# Patient Record
Sex: FEMALE | Race: WHITE | NOT HISPANIC OR LATINO | Employment: OTHER | URBAN - METROPOLITAN AREA
[De-identification: names, ages, dates, MRNs, and addresses within clinical notes are randomized per-mention and may not be internally consistent; named-entity substitution may affect disease eponyms.]

---

## 2017-02-06 ENCOUNTER — COMMUNICATION - HEALTHEAST (OUTPATIENT)
Dept: INTERNAL MEDICINE | Facility: CLINIC | Age: 77
End: 2017-02-06

## 2017-02-06 DIAGNOSIS — I10 ESSENTIAL HYPERTENSION: ICD-10-CM

## 2017-02-08 ENCOUNTER — COMMUNICATION - HEALTHEAST (OUTPATIENT)
Dept: INTERNAL MEDICINE | Facility: CLINIC | Age: 77
End: 2017-02-08

## 2017-02-08 DIAGNOSIS — I10 ESSENTIAL HYPERTENSION: ICD-10-CM

## 2017-02-14 ENCOUNTER — OFFICE VISIT - HEALTHEAST (OUTPATIENT)
Dept: INTERNAL MEDICINE | Facility: CLINIC | Age: 77
End: 2017-02-14

## 2017-02-14 ENCOUNTER — AMBULATORY - HEALTHEAST (OUTPATIENT)
Dept: INTERNAL MEDICINE | Facility: CLINIC | Age: 77
End: 2017-02-14

## 2017-02-14 DIAGNOSIS — E03.9 HYPOTHYROIDISM, UNSPECIFIED TYPE: ICD-10-CM

## 2017-02-14 DIAGNOSIS — L82.0 SEBORRHEIC KERATOSES, INFLAMED: ICD-10-CM

## 2017-02-14 ASSESSMENT — MIFFLIN-ST. JEOR: SCORE: 1207.3

## 2017-03-20 ENCOUNTER — AMBULATORY - HEALTHEAST (OUTPATIENT)
Dept: INTERNAL MEDICINE | Facility: CLINIC | Age: 77
End: 2017-03-20

## 2017-03-20 ENCOUNTER — OFFICE VISIT - HEALTHEAST (OUTPATIENT)
Dept: INTERNAL MEDICINE | Facility: CLINIC | Age: 77
End: 2017-03-20

## 2017-03-20 DIAGNOSIS — K58.0 IRRITABLE BOWEL SYNDROME WITH DIARRHEA: ICD-10-CM

## 2017-03-20 DIAGNOSIS — Z01.818 PRE-OPERATIVE EXAMINATION FOR INTERNAL MEDICINE: ICD-10-CM

## 2017-03-20 LAB
ATRIAL RATE - MUSE: 66 BPM
DIASTOLIC BLOOD PRESSURE - MUSE: NORMAL MMHG
INTERPRETATION ECG - MUSE: NORMAL
P AXIS - MUSE: 52 DEGREES
PR INTERVAL - MUSE: 152 MS
QRS DURATION - MUSE: 80 MS
QT - MUSE: 418 MS
QTC - MUSE: 438 MS
R AXIS - MUSE: -24 DEGREES
SYSTOLIC BLOOD PRESSURE - MUSE: NORMAL MMHG
T AXIS - MUSE: 59 DEGREES
VENTRICULAR RATE- MUSE: 66 BPM

## 2017-03-20 ASSESSMENT — MIFFLIN-ST. JEOR: SCORE: 1200.49

## 2017-03-21 ENCOUNTER — COMMUNICATION - HEALTHEAST (OUTPATIENT)
Dept: INTERNAL MEDICINE | Facility: CLINIC | Age: 77
End: 2017-03-21

## 2017-04-11 ENCOUNTER — RECORDS - HEALTHEAST (OUTPATIENT)
Dept: ADMINISTRATIVE | Facility: OTHER | Age: 77
End: 2017-04-11

## 2017-04-18 ENCOUNTER — COMMUNICATION - HEALTHEAST (OUTPATIENT)
Dept: INTERNAL MEDICINE | Facility: CLINIC | Age: 77
End: 2017-04-18

## 2017-05-26 ENCOUNTER — RECORDS - HEALTHEAST (OUTPATIENT)
Dept: ADMINISTRATIVE | Facility: OTHER | Age: 77
End: 2017-05-26

## 2017-05-30 ENCOUNTER — RECORDS - HEALTHEAST (OUTPATIENT)
Dept: ADMINISTRATIVE | Facility: OTHER | Age: 77
End: 2017-05-30

## 2017-06-06 ENCOUNTER — RECORDS - HEALTHEAST (OUTPATIENT)
Dept: MAMMOGRAPHY | Facility: CLINIC | Age: 77
End: 2017-06-06

## 2017-06-06 DIAGNOSIS — Z12.31 ENCOUNTER FOR SCREENING MAMMOGRAM FOR MALIGNANT NEOPLASM OF BREAST: ICD-10-CM

## 2017-06-07 ENCOUNTER — COMMUNICATION - HEALTHEAST (OUTPATIENT)
Dept: INTERNAL MEDICINE | Facility: CLINIC | Age: 77
End: 2017-06-07

## 2017-06-07 DIAGNOSIS — I10 HTN (HYPERTENSION): ICD-10-CM

## 2017-07-12 ENCOUNTER — COMMUNICATION - HEALTHEAST (OUTPATIENT)
Dept: INTERNAL MEDICINE | Facility: CLINIC | Age: 77
End: 2017-07-12

## 2017-07-22 ENCOUNTER — COMMUNICATION - HEALTHEAST (OUTPATIENT)
Dept: INTERNAL MEDICINE | Facility: CLINIC | Age: 77
End: 2017-07-22

## 2017-07-22 DIAGNOSIS — I10 ESSENTIAL HYPERTENSION: ICD-10-CM

## 2017-08-11 ENCOUNTER — OFFICE VISIT - HEALTHEAST (OUTPATIENT)
Dept: INTERNAL MEDICINE | Facility: CLINIC | Age: 77
End: 2017-08-11

## 2017-08-11 DIAGNOSIS — I10 ESSENTIAL HYPERTENSION: ICD-10-CM

## 2017-08-11 DIAGNOSIS — K52.831 COLLAGENOUS COLITIS: ICD-10-CM

## 2017-08-11 ASSESSMENT — MIFFLIN-ST. JEOR: SCORE: 1195.96

## 2017-09-15 ENCOUNTER — RECORDS - HEALTHEAST (OUTPATIENT)
Dept: ADMINISTRATIVE | Facility: OTHER | Age: 77
End: 2017-09-15

## 2017-09-18 ENCOUNTER — RECORDS - HEALTHEAST (OUTPATIENT)
Dept: ADMINISTRATIVE | Facility: OTHER | Age: 77
End: 2017-09-18

## 2017-09-26 ENCOUNTER — RECORDS - HEALTHEAST (OUTPATIENT)
Dept: ADMINISTRATIVE | Facility: OTHER | Age: 77
End: 2017-09-26

## 2017-09-28 ENCOUNTER — OFFICE VISIT - HEALTHEAST (OUTPATIENT)
Dept: INTERNAL MEDICINE | Facility: CLINIC | Age: 77
End: 2017-09-28

## 2017-09-28 DIAGNOSIS — G47.00 INSOMNIA: ICD-10-CM

## 2017-09-28 DIAGNOSIS — M17.9 OA (OSTEOARTHRITIS) OF KNEE: ICD-10-CM

## 2017-09-28 DIAGNOSIS — Z23 NEED FOR INFLUENZA VACCINATION: ICD-10-CM

## 2017-09-28 DIAGNOSIS — I10 ESSENTIAL HYPERTENSION WITH GOAL BLOOD PRESSURE LESS THAN 140/90: ICD-10-CM

## 2017-12-31 ENCOUNTER — RECORDS - HEALTHEAST (OUTPATIENT)
Dept: ADMINISTRATIVE | Facility: OTHER | Age: 77
End: 2017-12-31

## 2018-02-07 ENCOUNTER — COMMUNICATION - HEALTHEAST (OUTPATIENT)
Dept: INTERNAL MEDICINE | Facility: CLINIC | Age: 78
End: 2018-02-07

## 2018-02-07 ENCOUNTER — OFFICE VISIT - HEALTHEAST (OUTPATIENT)
Dept: INTERNAL MEDICINE | Facility: CLINIC | Age: 78
End: 2018-02-07

## 2018-02-07 DIAGNOSIS — I10 ESSENTIAL HYPERTENSION: ICD-10-CM

## 2018-02-07 DIAGNOSIS — E55.9 VITAMIN D DEFICIENCY: ICD-10-CM

## 2018-02-07 DIAGNOSIS — I35.0 AORTIC STENOSIS, MILD: ICD-10-CM

## 2018-02-07 DIAGNOSIS — Z00.01 ENCOUNTER FOR GENERAL ADULT MEDICAL EXAMINATION WITH ABNORMAL FINDINGS: ICD-10-CM

## 2018-02-07 DIAGNOSIS — Z78.0 MENOPAUSE: ICD-10-CM

## 2018-02-07 DIAGNOSIS — E78.00 HYPERCHOLESTEROLEMIA: ICD-10-CM

## 2018-02-07 LAB
ALBUMIN SERPL-MCNC: 3.8 G/DL (ref 3.5–5)
ALBUMIN UR-MCNC: NEGATIVE MG/DL
ALP SERPL-CCNC: 75 U/L (ref 45–120)
ALT SERPL W P-5'-P-CCNC: 17 U/L (ref 0–45)
ANION GAP SERPL CALCULATED.3IONS-SCNC: 10 MMOL/L (ref 5–18)
APPEARANCE UR: CLEAR
AST SERPL W P-5'-P-CCNC: 22 U/L (ref 0–40)
BACTERIA #/AREA URNS HPF: ABNORMAL HPF
BILIRUB DIRECT SERPL-MCNC: 0.3 MG/DL
BILIRUB SERPL-MCNC: 0.8 MG/DL (ref 0–1)
BILIRUB UR QL STRIP: NEGATIVE
BUN SERPL-MCNC: 11 MG/DL (ref 8–28)
CALCIUM SERPL-MCNC: 9.1 MG/DL (ref 8.5–10.5)
CHLORIDE BLD-SCNC: 100 MMOL/L (ref 98–107)
CHOLEST SERPL-MCNC: 177 MG/DL
CO2 SERPL-SCNC: 26 MMOL/L (ref 22–31)
COLOR UR AUTO: YELLOW
CREAT SERPL-MCNC: 0.87 MG/DL (ref 0.6–1.1)
ERYTHROCYTE [DISTWIDTH] IN BLOOD BY AUTOMATED COUNT: 13.1 % (ref 11–14.5)
FASTING STATUS PATIENT QL REPORTED: YES
GFR SERPL CREATININE-BSD FRML MDRD: >60 ML/MIN/1.73M2
GLUCOSE BLD-MCNC: 88 MG/DL (ref 70–125)
GLUCOSE UR STRIP-MCNC: NEGATIVE MG/DL
HCT VFR BLD AUTO: 40.3 % (ref 35–47)
HDLC SERPL-MCNC: 83 MG/DL
HGB BLD-MCNC: 13.4 G/DL (ref 12–16)
HGB UR QL STRIP: ABNORMAL
KETONES UR STRIP-MCNC: NEGATIVE MG/DL
LDLC SERPL CALC-MCNC: 83 MG/DL
LEUKOCYTE ESTERASE UR QL STRIP: ABNORMAL
MCH RBC QN AUTO: 28.9 PG (ref 27–34)
MCHC RBC AUTO-ENTMCNC: 33.2 G/DL (ref 32–36)
MCV RBC AUTO: 87 FL (ref 80–100)
NITRATE UR QL: POSITIVE
PH UR STRIP: 7.5 [PH] (ref 5–8)
PLATELET # BLD AUTO: 216 THOU/UL (ref 140–440)
PMV BLD AUTO: 7.9 FL (ref 7–10)
POTASSIUM BLD-SCNC: 4.3 MMOL/L (ref 3.5–5)
PROT SERPL-MCNC: 6.9 G/DL (ref 6–8)
RBC # BLD AUTO: 4.62 MILL/UL (ref 3.8–5.4)
RBC #/AREA URNS AUTO: ABNORMAL HPF
SODIUM SERPL-SCNC: 136 MMOL/L (ref 136–145)
SP GR UR STRIP: 1.01 (ref 1–1.03)
SQUAMOUS #/AREA URNS AUTO: ABNORMAL LPF
TRIGL SERPL-MCNC: 56 MG/DL
TSH SERPL DL<=0.005 MIU/L-ACNC: 3.04 UIU/ML (ref 0.3–5)
UROBILINOGEN UR STRIP-ACNC: ABNORMAL
WBC #/AREA URNS AUTO: ABNORMAL HPF
WBC: 6.8 THOU/UL (ref 4–11)

## 2018-02-07 ASSESSMENT — MIFFLIN-ST. JEOR: SCORE: 1207.58

## 2018-02-08 LAB
25(OH)D3 SERPL-MCNC: 39.9 NG/ML (ref 30–80)
25(OH)D3 SERPL-MCNC: 39.9 NG/ML (ref 30–80)

## 2018-02-09 LAB — BACTERIA SPEC CULT: ABNORMAL

## 2018-02-14 ENCOUNTER — HOSPITAL ENCOUNTER (OUTPATIENT)
Dept: CARDIOLOGY | Facility: CLINIC | Age: 78
Discharge: HOME | End: 2018-02-14
Attending: INTERNAL MEDICINE

## 2018-02-14 DIAGNOSIS — I35.0 AORTIC STENOSIS, MILD: ICD-10-CM

## 2018-02-14 LAB
AORTIC ROOT: 2.7 CM
AORTIC VALVE MEAN VELOCITY: 154 CM/S
AR DECEL SLOPE: 2610 MM/S2
AR PEAK VELOCITY: 449 CM/S
ASCENDING AORTA: 3.2 CM
AV DIMENSIONLESS INDEX VTI: 0.7
AV MEAN GRADIENT: 11 MMHG
AV PEAK GRADIENT: 23.2 MMHG
AV REGURGITANT PEAK GRADIENT: 80.6 MMHG
AV REGURGITATION PRESSURE HALF TIME: 507 MS
AV VALVE AREA: 1.9 CM2
AV VELOCITY RATIO: 0.6
BSA FOR ECHO PROCEDURE: 1.82 M2
CV BLOOD PRESSURE: NORMAL MMHG
CV ECHO HEIGHT: 67.5 IN
CV ECHO WEIGHT: 153 LBS
DOP CALC AO PEAK VEL: 241 CM/S
DOP CALC AO VTI: 46 CM
DOP CALC LVOT AREA: 2.83 CM2
DOP CALC LVOT DIAMETER: 1.9 CM
DOP CALC LVOT PEAK VEL: 134 CM/S
DOP CALC LVOT STROKE VOLUME: 88.4 CM3
DOP CALCLVOT PEAK VEL VTI: 31.2 CM
EJECTION FRACTION: 62 % (ref 55–75)
FRACTIONAL SHORTENING: 36 % (ref 28–44)
INTERVENTRICULAR SEPTUM IN END DIASTOLE: 1.1 CM (ref 0.6–0.9)
IVS/PW RATIO: 0.9
LA AREA 1: 22.1 CM2
LA AREA 2: 24 CM2
LEFT ATRIUM LENGTH: 5.94 CM
LEFT ATRIUM SIZE: 4.3 CM
LEFT ATRIUM VOLUME INDEX: 41.7 ML/M2
LEFT ATRIUM VOLUME: 75.9 ML
LEFT VENTRICLE DIASTOLIC VOLUME INDEX: 40.7 CM3/M2 (ref 34–74)
LEFT VENTRICLE DIASTOLIC VOLUME: 74 CM3 (ref 46–106)
LEFT VENTRICLE MASS INDEX: 121 G/M2
LEFT VENTRICLE SYSTOLIC VOLUME INDEX: 15.4 CM3/M2 (ref 11–31)
LEFT VENTRICLE SYSTOLIC VOLUME: 28 CM3 (ref 14–42)
LEFT VENTRICULAR INTERNAL DIMENSION IN DIASTOLE: 5 CM (ref 3.8–5.2)
LEFT VENTRICULAR INTERNAL DIMENSION IN SYSTOLE: 3.2 CM (ref 2.2–3.5)
LEFT VENTRICULAR MASS: 220.3 G
LEFT VENTRICULAR OUTFLOW TRACT MEAN GRADIENT: 4 MMHG
LEFT VENTRICULAR OUTFLOW TRACT MEAN VELOCITY: 83.8 CM/S
LEFT VENTRICULAR OUTFLOW TRACT PEAK GRADIENT: 7 MMHG
LEFT VENTRICULAR POSTERIOR WALL IN END DIASTOLE: 1.2 CM (ref 0.6–0.9)
LV STROKE VOLUME INDEX: 48.6 ML/M2
MITRAL VALVE E/A RATIO: 1.3
MV AVERAGE E/E' RATIO: 7.9 CM/S
MV DECELERATION TIME: 215 MS
MV E'TISSUE VEL-LAT: 9.85 CM/S
MV E'TISSUE VEL-MED: 7.21 CM/S
MV LATERAL E/E' RATIO: 6.9
MV MEDIAL E/E' RATIO: 9.4
MV PEAK A VELOCITY: 50.8 CM/S
MV PEAK E VELOCITY: 67.6 CM/S
NUC REST DIASTOLIC VOLUME INDEX: 2455 LBS
NUC REST SYSTOLIC VOLUME INDEX: 67.5 IN
TRICUSPID VALVE ANULAR PLANE SYSTOLIC EXCURSION: 3.5 CM

## 2018-02-14 ASSESSMENT — MIFFLIN-ST. JEOR: SCORE: 1211.55

## 2018-02-21 ENCOUNTER — RECORDS - HEALTHEAST (OUTPATIENT)
Dept: ADMINISTRATIVE | Facility: OTHER | Age: 78
End: 2018-02-21

## 2018-02-22 ENCOUNTER — RECORDS - HEALTHEAST (OUTPATIENT)
Dept: ADMINISTRATIVE | Facility: OTHER | Age: 78
End: 2018-02-22

## 2018-02-22 ENCOUNTER — RECORDS - HEALTHEAST (OUTPATIENT)
Dept: BONE DENSITY | Facility: CLINIC | Age: 78
End: 2018-02-22

## 2018-02-22 DIAGNOSIS — Z78.0 ASYMPTOMATIC MENOPAUSAL STATE: ICD-10-CM

## 2018-05-01 ENCOUNTER — COMMUNICATION - HEALTHEAST (OUTPATIENT)
Dept: INTERNAL MEDICINE | Facility: CLINIC | Age: 78
End: 2018-05-01

## 2018-05-01 DIAGNOSIS — I10 ESSENTIAL HYPERTENSION: ICD-10-CM

## 2018-06-07 ENCOUNTER — RECORDS - HEALTHEAST (OUTPATIENT)
Dept: ADMINISTRATIVE | Facility: OTHER | Age: 78
End: 2018-06-07

## 2018-06-18 ENCOUNTER — RECORDS - HEALTHEAST (OUTPATIENT)
Dept: ADMINISTRATIVE | Facility: OTHER | Age: 78
End: 2018-06-18

## 2018-07-13 ENCOUNTER — RECORDS - HEALTHEAST (OUTPATIENT)
Dept: ADMINISTRATIVE | Facility: OTHER | Age: 78
End: 2018-07-13

## 2018-08-17 ENCOUNTER — RECORDS - HEALTHEAST (OUTPATIENT)
Dept: ADMINISTRATIVE | Facility: OTHER | Age: 78
End: 2018-08-17

## 2018-08-23 ENCOUNTER — RECORDS - HEALTHEAST (OUTPATIENT)
Dept: ADMINISTRATIVE | Facility: OTHER | Age: 78
End: 2018-08-23

## 2018-09-18 ENCOUNTER — OFFICE VISIT - HEALTHEAST (OUTPATIENT)
Dept: INTERNAL MEDICINE | Facility: CLINIC | Age: 78
End: 2018-09-18

## 2018-09-18 DIAGNOSIS — Z23 FLU VACCINE NEED: ICD-10-CM

## 2018-09-18 DIAGNOSIS — I10 ESSENTIAL HYPERTENSION: ICD-10-CM

## 2018-10-30 ENCOUNTER — COMMUNICATION - HEALTHEAST (OUTPATIENT)
Dept: INTERNAL MEDICINE | Facility: CLINIC | Age: 78
End: 2018-10-30

## 2018-10-30 DIAGNOSIS — I10 ESSENTIAL HYPERTENSION: ICD-10-CM

## 2018-12-03 ENCOUNTER — COMMUNICATION - HEALTHEAST (OUTPATIENT)
Dept: SCHEDULING | Facility: CLINIC | Age: 78
End: 2018-12-03

## 2018-12-05 ENCOUNTER — OFFICE VISIT - HEALTHEAST (OUTPATIENT)
Dept: INTERNAL MEDICINE | Facility: CLINIC | Age: 78
End: 2018-12-05

## 2018-12-05 DIAGNOSIS — I10 ESSENTIAL HYPERTENSION: ICD-10-CM

## 2019-01-08 ENCOUNTER — RECORDS - HEALTHEAST (OUTPATIENT)
Dept: ADMINISTRATIVE | Facility: OTHER | Age: 79
End: 2019-01-08

## 2019-02-22 ENCOUNTER — RECORDS - HEALTHEAST (OUTPATIENT)
Dept: ADMINISTRATIVE | Facility: OTHER | Age: 79
End: 2019-02-22

## 2019-03-01 ENCOUNTER — OFFICE VISIT - HEALTHEAST (OUTPATIENT)
Dept: INTERNAL MEDICINE | Facility: CLINIC | Age: 79
End: 2019-03-01

## 2019-03-01 DIAGNOSIS — E78.00 HYPERCHOLESTEROLEMIA: ICD-10-CM

## 2019-03-01 DIAGNOSIS — Z12.31 ENCOUNTER FOR SCREENING MAMMOGRAM FOR BREAST CANCER: ICD-10-CM

## 2019-03-01 DIAGNOSIS — F41.1 GENERALIZED ANXIETY DISORDER: ICD-10-CM

## 2019-03-01 DIAGNOSIS — I10 ESSENTIAL HYPERTENSION: ICD-10-CM

## 2019-03-01 DIAGNOSIS — E55.9 VITAMIN D DEFICIENCY: ICD-10-CM

## 2019-03-01 DIAGNOSIS — Z00.00 ROUTINE GENERAL MEDICAL EXAMINATION AT A HEALTH CARE FACILITY: ICD-10-CM

## 2019-03-01 DIAGNOSIS — M89.9 DISORDER OF BONE AND CARTILAGE: ICD-10-CM

## 2019-03-01 DIAGNOSIS — M94.9 DISORDER OF BONE AND CARTILAGE: ICD-10-CM

## 2019-03-01 DIAGNOSIS — R68.84 JAW PAIN: ICD-10-CM

## 2019-03-01 LAB
ALBUMIN SERPL-MCNC: 3.9 G/DL (ref 3.5–5)
ALBUMIN UR-MCNC: NEGATIVE MG/DL
ALP SERPL-CCNC: 76 U/L (ref 45–120)
ALT SERPL W P-5'-P-CCNC: 18 U/L (ref 0–45)
ANION GAP SERPL CALCULATED.3IONS-SCNC: 9 MMOL/L (ref 5–18)
APPEARANCE UR: CLEAR
AST SERPL W P-5'-P-CCNC: 22 U/L (ref 0–40)
BACTERIA #/AREA URNS HPF: ABNORMAL HPF
BASOPHILS # BLD AUTO: 0.1 THOU/UL (ref 0–0.2)
BASOPHILS NFR BLD AUTO: 1 % (ref 0–2)
BILIRUB SERPL-MCNC: 0.7 MG/DL (ref 0–1)
BILIRUB UR QL STRIP: NEGATIVE
BUN SERPL-MCNC: 12 MG/DL (ref 8–28)
CALCIUM SERPL-MCNC: 9.1 MG/DL (ref 8.5–10.5)
CHLORIDE BLD-SCNC: 100 MMOL/L (ref 98–107)
CHOLEST SERPL-MCNC: 178 MG/DL
CO2 SERPL-SCNC: 28 MMOL/L (ref 22–31)
COLOR UR AUTO: YELLOW
CREAT SERPL-MCNC: 0.87 MG/DL (ref 0.6–1.1)
EOSINOPHIL # BLD AUTO: 0.2 THOU/UL (ref 0–0.4)
EOSINOPHIL NFR BLD AUTO: 3 % (ref 0–6)
ERYTHROCYTE [DISTWIDTH] IN BLOOD BY AUTOMATED COUNT: 11.7 % (ref 11–14.5)
ERYTHROCYTE [SEDIMENTATION RATE] IN BLOOD BY WESTERGREN METHOD: 28 MM/HR (ref 0–20)
FASTING STATUS PATIENT QL REPORTED: YES
GFR SERPL CREATININE-BSD FRML MDRD: >60 ML/MIN/1.73M2
GLUCOSE BLD-MCNC: 69 MG/DL (ref 70–125)
GLUCOSE UR STRIP-MCNC: NEGATIVE MG/DL
HCT VFR BLD AUTO: 39.9 % (ref 35–47)
HDLC SERPL-MCNC: 76 MG/DL
HGB BLD-MCNC: 13.4 G/DL (ref 12–16)
HGB UR QL STRIP: ABNORMAL
KETONES UR STRIP-MCNC: NEGATIVE MG/DL
LDLC SERPL CALC-MCNC: 87 MG/DL
LEUKOCYTE ESTERASE UR QL STRIP: NEGATIVE
LYMPHOCYTES # BLD AUTO: 1.7 THOU/UL (ref 0.8–4.4)
LYMPHOCYTES NFR BLD AUTO: 23 % (ref 20–40)
MCH RBC QN AUTO: 30.8 PG (ref 27–34)
MCHC RBC AUTO-ENTMCNC: 33.6 G/DL (ref 32–36)
MCV RBC AUTO: 92 FL (ref 80–100)
MONOCYTES # BLD AUTO: 0.7 THOU/UL (ref 0–0.9)
MONOCYTES NFR BLD AUTO: 9 % (ref 2–10)
NEUTROPHILS # BLD AUTO: 4.9 THOU/UL (ref 2–7.7)
NEUTROPHILS NFR BLD AUTO: 65 % (ref 50–70)
NITRATE UR QL: NEGATIVE
PH UR STRIP: 7 [PH] (ref 5–8)
PLATELET # BLD AUTO: 233 THOU/UL (ref 140–440)
PMV BLD AUTO: 7.7 FL (ref 7–10)
POTASSIUM BLD-SCNC: 4.3 MMOL/L (ref 3.5–5)
PROT SERPL-MCNC: 7.1 G/DL (ref 6–8)
RBC # BLD AUTO: 4.35 MILL/UL (ref 3.8–5.4)
RBC #/AREA URNS AUTO: ABNORMAL HPF
SODIUM SERPL-SCNC: 137 MMOL/L (ref 136–145)
SP GR UR STRIP: 1.01 (ref 1–1.03)
SQUAMOUS #/AREA URNS AUTO: ABNORMAL LPF
TRIGL SERPL-MCNC: 76 MG/DL
TSH SERPL DL<=0.005 MIU/L-ACNC: 5.56 UIU/ML (ref 0.3–5)
UROBILINOGEN UR STRIP-ACNC: ABNORMAL
WBC #/AREA URNS AUTO: ABNORMAL HPF
WBC: 7.5 THOU/UL (ref 4–11)

## 2019-03-01 ASSESSMENT — MIFFLIN-ST. JEOR: SCORE: 1238.83

## 2019-03-04 LAB
25(OH)D3 SERPL-MCNC: 53.2 NG/ML (ref 30–80)
25(OH)D3 SERPL-MCNC: 53.2 NG/ML (ref 30–80)

## 2019-03-21 ENCOUNTER — COMMUNICATION - HEALTHEAST (OUTPATIENT)
Dept: INTERNAL MEDICINE | Facility: CLINIC | Age: 79
End: 2019-03-21

## 2019-03-28 ENCOUNTER — RECORDS - HEALTHEAST (OUTPATIENT)
Dept: MAMMOGRAPHY | Facility: CLINIC | Age: 79
End: 2019-03-28

## 2019-03-28 DIAGNOSIS — Z12.31 ENCOUNTER FOR SCREENING MAMMOGRAM FOR MALIGNANT NEOPLASM OF BREAST: ICD-10-CM

## 2019-04-05 ENCOUNTER — COMMUNICATION - HEALTHEAST (OUTPATIENT)
Dept: INTERNAL MEDICINE | Facility: CLINIC | Age: 79
End: 2019-04-05

## 2019-04-05 DIAGNOSIS — I10 ESSENTIAL HYPERTENSION: ICD-10-CM

## 2019-05-08 ENCOUNTER — OFFICE VISIT - HEALTHEAST (OUTPATIENT)
Dept: INTERNAL MEDICINE | Facility: CLINIC | Age: 79
End: 2019-05-08

## 2019-05-08 DIAGNOSIS — R79.89 ABNORMAL TSH: ICD-10-CM

## 2019-05-08 DIAGNOSIS — R41.3 MEMORY CHANGES: ICD-10-CM

## 2019-05-08 DIAGNOSIS — F32.5 MAJOR DEPRESSIVE DISORDER, SINGLE EPISODE IN FULL REMISSION (H): ICD-10-CM

## 2019-05-08 DIAGNOSIS — L71.9 ROSACEA: ICD-10-CM

## 2019-05-08 DIAGNOSIS — I10 ESSENTIAL HYPERTENSION: ICD-10-CM

## 2019-05-08 LAB
TSH SERPL DL<=0.005 MIU/L-ACNC: 2.06 UIU/ML (ref 0.3–5)
VIT B12 SERPL-MCNC: 515 PG/ML (ref 213–816)

## 2019-05-08 RX ORDER — FLUVOXAMINE MALEATE 25 MG
TABLET ORAL
Refills: 0 | Status: SHIPPED
Start: 2019-05-08 | End: 2022-05-12

## 2019-05-08 RX ORDER — PAROXETINE 10 MG/1
TABLET, FILM COATED ORAL
Refills: 0 | Status: SHIPPED
Start: 2019-05-08 | End: 2021-10-12

## 2019-05-08 ASSESSMENT — MIFFLIN-ST. JEOR: SCORE: 1228.85

## 2019-05-09 ENCOUNTER — COMMUNICATION - HEALTHEAST (OUTPATIENT)
Dept: INTERNAL MEDICINE | Facility: CLINIC | Age: 79
End: 2019-05-09

## 2019-05-14 ENCOUNTER — COMMUNICATION - HEALTHEAST (OUTPATIENT)
Dept: SCHEDULING | Facility: CLINIC | Age: 79
End: 2019-05-14

## 2019-05-14 DIAGNOSIS — I10 ESSENTIAL HYPERTENSION, BENIGN: ICD-10-CM

## 2019-05-21 ENCOUNTER — AMBULATORY - HEALTHEAST (OUTPATIENT)
Dept: NURSING | Facility: CLINIC | Age: 79
End: 2019-05-21

## 2019-06-04 ENCOUNTER — RECORDS - HEALTHEAST (OUTPATIENT)
Dept: ADMINISTRATIVE | Facility: OTHER | Age: 79
End: 2019-06-04

## 2019-06-06 ENCOUNTER — OFFICE VISIT - HEALTHEAST (OUTPATIENT)
Dept: INTERNAL MEDICINE | Facility: CLINIC | Age: 79
End: 2019-06-06

## 2019-06-06 DIAGNOSIS — R35.0 URINE FREQUENCY: ICD-10-CM

## 2019-06-06 DIAGNOSIS — G93.40 ENCEPHALOPATHY: ICD-10-CM

## 2019-06-06 LAB
ALBUMIN UR-MCNC: ABNORMAL MG/DL
APPEARANCE UR: ABNORMAL
BACTERIA #/AREA URNS HPF: ABNORMAL HPF
BILIRUB UR QL STRIP: NEGATIVE
COLOR UR AUTO: YELLOW
GLUCOSE UR STRIP-MCNC: NEGATIVE MG/DL
HGB UR QL STRIP: ABNORMAL
KETONES UR STRIP-MCNC: NEGATIVE MG/DL
LEUKOCYTE ESTERASE UR QL STRIP: ABNORMAL
NITRATE UR QL: POSITIVE
PH UR STRIP: 6 [PH] (ref 5–8)
RBC #/AREA URNS AUTO: ABNORMAL HPF
SP GR UR STRIP: 1.01 (ref 1–1.03)
SQUAMOUS #/AREA URNS AUTO: ABNORMAL LPF
UROBILINOGEN UR STRIP-ACNC: ABNORMAL
WBC #/AREA URNS AUTO: ABNORMAL HPF

## 2019-06-06 ASSESSMENT — MIFFLIN-ST. JEOR: SCORE: 1228.85

## 2019-06-08 LAB — BACTERIA SPEC CULT: ABNORMAL

## 2019-06-11 ENCOUNTER — RECORDS - HEALTHEAST (OUTPATIENT)
Dept: LAB | Facility: CLINIC | Age: 79
End: 2019-06-11

## 2019-06-12 ENCOUNTER — OFFICE VISIT - HEALTHEAST (OUTPATIENT)
Dept: GERIATRICS | Facility: CLINIC | Age: 79
End: 2019-06-12

## 2019-06-12 DIAGNOSIS — F42.9 OBSESSIVE-COMPULSIVE DISORDER, UNSPECIFIED TYPE: ICD-10-CM

## 2019-06-12 DIAGNOSIS — Z87.898 HISTORY OF BACTEREMIA: ICD-10-CM

## 2019-06-12 DIAGNOSIS — F32.5 MAJOR DEPRESSIVE DISORDER, SINGLE EPISODE IN FULL REMISSION (H): ICD-10-CM

## 2019-06-12 DIAGNOSIS — E87.1 HYPONATREMIA: ICD-10-CM

## 2019-06-12 DIAGNOSIS — K58.0 IRRITABLE BOWEL SYNDROME WITH DIARRHEA: ICD-10-CM

## 2019-06-12 DIAGNOSIS — F41.1 GENERALIZED ANXIETY DISORDER: ICD-10-CM

## 2019-06-12 LAB
ERYTHROCYTE [DISTWIDTH] IN BLOOD BY AUTOMATED COUNT: 14.5 % (ref 11–14.5)
HCT VFR BLD AUTO: 32.4 % (ref 35–47)
HGB BLD-MCNC: 11.7 G/DL (ref 12–16)
MCH RBC QN AUTO: 33.9 PG (ref 27–34)
MCHC RBC AUTO-ENTMCNC: 36.1 G/DL (ref 32–36)
MCV RBC AUTO: 94 FL (ref 80–100)
PLATELET # BLD AUTO: 426 THOU/UL (ref 140–440)
PMV BLD AUTO: 10.2 FL (ref 8.5–12.5)
RBC # BLD AUTO: 3.45 MILL/UL (ref 3.8–5.4)
WBC: 14.3 THOU/UL (ref 4–11)

## 2019-06-12 ASSESSMENT — MIFFLIN-ST. JEOR: SCORE: 1261.96

## 2019-06-13 ENCOUNTER — COMMUNICATION - HEALTHEAST (OUTPATIENT)
Dept: GERIATRICS | Facility: CLINIC | Age: 79
End: 2019-06-13

## 2019-06-13 ENCOUNTER — RECORDS - HEALTHEAST (OUTPATIENT)
Dept: LAB | Facility: CLINIC | Age: 79
End: 2019-06-13

## 2019-06-13 LAB
ANION GAP SERPL CALCULATED.3IONS-SCNC: 10 MMOL/L (ref 5–18)
BUN SERPL-MCNC: 18 MG/DL (ref 8–28)
CALCIUM SERPL-MCNC: 9.9 MG/DL (ref 8.5–10.5)
CHLORIDE BLD-SCNC: 100 MMOL/L (ref 98–107)
CO2 SERPL-SCNC: 26 MMOL/L (ref 22–31)
CREAT SERPL-MCNC: 0.9 MG/DL (ref 0.6–1.1)
ERYTHROCYTE [DISTWIDTH] IN BLOOD BY AUTOMATED COUNT: 14.2 % (ref 11–14.5)
GFR SERPL CREATININE-BSD FRML MDRD: >60 ML/MIN/1.73M2
GLUCOSE BLD-MCNC: 101 MG/DL (ref 70–125)
HCT VFR BLD AUTO: 35.2 % (ref 35–47)
HGB BLD-MCNC: 12 G/DL (ref 12–16)
MCH RBC QN AUTO: 32.1 PG (ref 27–34)
MCHC RBC AUTO-ENTMCNC: 34.1 G/DL (ref 32–36)
MCV RBC AUTO: 94 FL (ref 80–100)
PLATELET # BLD AUTO: 462 THOU/UL (ref 140–440)
PMV BLD AUTO: 10.1 FL (ref 8.5–12.5)
POTASSIUM BLD-SCNC: 4.3 MMOL/L (ref 3.5–5)
RBC # BLD AUTO: 3.74 MILL/UL (ref 3.8–5.4)
SODIUM SERPL-SCNC: 136 MMOL/L (ref 136–145)
WBC: 13.2 THOU/UL (ref 4–11)

## 2019-06-14 ENCOUNTER — OFFICE VISIT - HEALTHEAST (OUTPATIENT)
Dept: GERIATRICS | Facility: CLINIC | Age: 79
End: 2019-06-14

## 2019-06-14 DIAGNOSIS — R78.81 BACTEREMIA: ICD-10-CM

## 2019-06-14 DIAGNOSIS — F41.1 GENERALIZED ANXIETY DISORDER: ICD-10-CM

## 2019-06-14 DIAGNOSIS — N30.90 CYSTITIS: ICD-10-CM

## 2019-06-14 DIAGNOSIS — E87.6 HYPOKALEMIA: ICD-10-CM

## 2019-06-14 DIAGNOSIS — K58.0 IRRITABLE BOWEL SYNDROME WITH DIARRHEA: ICD-10-CM

## 2019-06-14 DIAGNOSIS — F42.9 OBSESSIVE-COMPULSIVE DISORDER, UNSPECIFIED TYPE: ICD-10-CM

## 2019-06-14 DIAGNOSIS — I10 ESSENTIAL HYPERTENSION: ICD-10-CM

## 2019-06-14 DIAGNOSIS — E87.1 HYPONATREMIA: ICD-10-CM

## 2019-06-17 ENCOUNTER — OFFICE VISIT - HEALTHEAST (OUTPATIENT)
Dept: GERIATRICS | Facility: CLINIC | Age: 79
End: 2019-06-17

## 2019-06-17 DIAGNOSIS — K58.0 IRRITABLE BOWEL SYNDROME WITH DIARRHEA: ICD-10-CM

## 2019-06-17 DIAGNOSIS — I10 ESSENTIAL HYPERTENSION: ICD-10-CM

## 2019-06-17 DIAGNOSIS — F42.9 OBSESSIVE-COMPULSIVE DISORDER, UNSPECIFIED TYPE: ICD-10-CM

## 2019-06-17 DIAGNOSIS — E87.1 HYPONATREMIA: ICD-10-CM

## 2019-06-17 DIAGNOSIS — F41.1 GENERALIZED ANXIETY DISORDER: ICD-10-CM

## 2019-06-17 DIAGNOSIS — Z87.898 HISTORY OF BACTEREMIA: ICD-10-CM

## 2019-06-17 DIAGNOSIS — F32.5 MAJOR DEPRESSIVE DISORDER, SINGLE EPISODE IN FULL REMISSION (H): ICD-10-CM

## 2019-06-17 ASSESSMENT — MIFFLIN-ST. JEOR: SCORE: 1248.35

## 2019-06-20 ENCOUNTER — AMBULATORY - HEALTHEAST (OUTPATIENT)
Dept: GERIATRICS | Facility: CLINIC | Age: 79
End: 2019-06-20

## 2019-06-20 ENCOUNTER — OFFICE VISIT - HEALTHEAST (OUTPATIENT)
Dept: INTERNAL MEDICINE | Facility: CLINIC | Age: 79
End: 2019-06-20

## 2019-06-20 ENCOUNTER — COMMUNICATION - HEALTHEAST (OUTPATIENT)
Dept: GERIATRICS | Facility: CLINIC | Age: 79
End: 2019-06-20

## 2019-06-20 DIAGNOSIS — N39.0 URINARY TRACT INFECTION WITHOUT HEMATURIA, SITE UNSPECIFIED: ICD-10-CM

## 2019-06-20 DIAGNOSIS — L30.4 INTERTRIGO: ICD-10-CM

## 2019-06-20 DIAGNOSIS — I10 ESSENTIAL HYPERTENSION: ICD-10-CM

## 2019-06-20 DIAGNOSIS — K13.0 ANGULAR CHEILITIS: ICD-10-CM

## 2019-06-20 LAB
ALBUMIN UR-MCNC: NEGATIVE MG/DL
APPEARANCE UR: CLEAR
BACTERIA #/AREA URNS HPF: ABNORMAL HPF
BASOPHILS # BLD AUTO: 0.1 THOU/UL (ref 0–0.2)
BASOPHILS NFR BLD AUTO: 1 % (ref 0–2)
BILIRUB UR QL STRIP: NEGATIVE
COLOR UR AUTO: YELLOW
EOSINOPHIL # BLD AUTO: 0.1 THOU/UL (ref 0–0.4)
EOSINOPHIL NFR BLD AUTO: 1 % (ref 0–6)
ERYTHROCYTE [DISTWIDTH] IN BLOOD BY AUTOMATED COUNT: 11.2 % (ref 11–14.5)
GLUCOSE UR STRIP-MCNC: NEGATIVE MG/DL
HCT VFR BLD AUTO: 36.5 % (ref 35–47)
HGB BLD-MCNC: 12.3 G/DL (ref 12–16)
HGB UR QL STRIP: ABNORMAL
KETONES UR STRIP-MCNC: NEGATIVE MG/DL
LEUKOCYTE ESTERASE UR QL STRIP: ABNORMAL
LYMPHOCYTES # BLD AUTO: 1.7 THOU/UL (ref 0.8–4.4)
LYMPHOCYTES NFR BLD AUTO: 17 % (ref 20–40)
MCH RBC QN AUTO: 30.5 PG (ref 27–34)
MCHC RBC AUTO-ENTMCNC: 33.7 G/DL (ref 32–36)
MCV RBC AUTO: 90 FL (ref 80–100)
MONOCYTES # BLD AUTO: 0.8 THOU/UL (ref 0–0.9)
MONOCYTES NFR BLD AUTO: 8 % (ref 2–10)
NEUTROPHILS # BLD AUTO: 7.1 THOU/UL (ref 2–7.7)
NEUTROPHILS NFR BLD AUTO: 72 % (ref 50–70)
NITRATE UR QL: NEGATIVE
PH UR STRIP: 6 [PH] (ref 5–8)
PLATELET # BLD AUTO: 399 THOU/UL (ref 140–440)
PMV BLD AUTO: 6.9 FL (ref 7–10)
RBC # BLD AUTO: 4.04 MILL/UL (ref 3.8–5.4)
RBC #/AREA URNS AUTO: ABNORMAL HPF
SP GR UR STRIP: 1.01 (ref 1–1.03)
SQUAMOUS #/AREA URNS AUTO: ABNORMAL LPF
UROBILINOGEN UR STRIP-ACNC: ABNORMAL
WBC #/AREA URNS AUTO: ABNORMAL HPF
WBC: 9.8 THOU/UL (ref 4–11)

## 2019-06-20 ASSESSMENT — MIFFLIN-ST. JEOR: SCORE: 1246.7

## 2019-06-21 LAB
ANION GAP SERPL CALCULATED.3IONS-SCNC: 12 MMOL/L (ref 5–18)
BUN SERPL-MCNC: 21 MG/DL (ref 8–28)
CALCIUM SERPL-MCNC: 9.6 MG/DL (ref 8.5–10.5)
CHLORIDE BLD-SCNC: 101 MMOL/L (ref 98–107)
CO2 SERPL-SCNC: 24 MMOL/L (ref 22–31)
CREAT SERPL-MCNC: 0.94 MG/DL (ref 0.6–1.1)
GFR SERPL CREATININE-BSD FRML MDRD: 58 ML/MIN/1.73M2
GLUCOSE BLD-MCNC: 70 MG/DL (ref 70–125)
POTASSIUM BLD-SCNC: 4.4 MMOL/L (ref 3.5–5)
SODIUM SERPL-SCNC: 137 MMOL/L (ref 136–145)

## 2019-06-23 LAB — BACTERIA SPEC CULT: ABNORMAL

## 2019-06-25 ENCOUNTER — COMMUNICATION - HEALTHEAST (OUTPATIENT)
Dept: INTERNAL MEDICINE | Facility: CLINIC | Age: 79
End: 2019-06-25

## 2019-06-25 ENCOUNTER — RECORDS - HEALTHEAST (OUTPATIENT)
Dept: ADMINISTRATIVE | Facility: OTHER | Age: 79
End: 2019-06-25

## 2019-06-25 DIAGNOSIS — N39.0 RECURRENT UTI: ICD-10-CM

## 2019-06-25 DIAGNOSIS — N95.2 VAGINAL ATROPHY: ICD-10-CM

## 2019-07-05 ENCOUNTER — COMMUNICATION - HEALTHEAST (OUTPATIENT)
Dept: SCHEDULING | Facility: CLINIC | Age: 79
End: 2019-07-05

## 2019-07-08 ENCOUNTER — OFFICE VISIT - HEALTHEAST (OUTPATIENT)
Dept: INTERNAL MEDICINE | Facility: CLINIC | Age: 79
End: 2019-07-08

## 2019-07-08 DIAGNOSIS — T50.905A ADVERSE EFFECT OF DRUG, INITIAL ENCOUNTER: ICD-10-CM

## 2019-07-08 DIAGNOSIS — R21 RASH: ICD-10-CM

## 2019-07-08 DIAGNOSIS — I10 ESSENTIAL HYPERTENSION: ICD-10-CM

## 2019-07-08 DIAGNOSIS — N39.0 RECURRENT UTI: ICD-10-CM

## 2019-07-08 ASSESSMENT — MIFFLIN-ST. JEOR: SCORE: 1253.79

## 2019-08-16 ENCOUNTER — COMMUNICATION - HEALTHEAST (OUTPATIENT)
Dept: INTERNAL MEDICINE | Facility: CLINIC | Age: 79
End: 2019-08-16

## 2019-08-16 DIAGNOSIS — I10 ESSENTIAL HYPERTENSION: ICD-10-CM

## 2019-08-26 ENCOUNTER — OFFICE VISIT - HEALTHEAST (OUTPATIENT)
Dept: INTERNAL MEDICINE | Facility: CLINIC | Age: 79
End: 2019-08-26

## 2019-08-26 DIAGNOSIS — R60.9 EDEMA, UNSPECIFIED TYPE: ICD-10-CM

## 2019-08-26 DIAGNOSIS — I87.2 VENOUS (PERIPHERAL) INSUFFICIENCY: ICD-10-CM

## 2019-08-26 DIAGNOSIS — I10 ESSENTIAL HYPERTENSION: ICD-10-CM

## 2019-08-26 ASSESSMENT — MIFFLIN-ST. JEOR: SCORE: 1264.85

## 2019-09-09 ENCOUNTER — OFFICE VISIT - HEALTHEAST (OUTPATIENT)
Dept: INTERNAL MEDICINE | Facility: CLINIC | Age: 79
End: 2019-09-09

## 2019-09-09 DIAGNOSIS — F41.9 ANXIETY: ICD-10-CM

## 2019-09-09 DIAGNOSIS — I10 ESSENTIAL HYPERTENSION: ICD-10-CM

## 2019-09-09 DIAGNOSIS — R60.9 EDEMA, UNSPECIFIED TYPE: ICD-10-CM

## 2019-09-09 ASSESSMENT — MIFFLIN-ST. JEOR: SCORE: 1253.79

## 2019-09-12 ENCOUNTER — COMMUNICATION - HEALTHEAST (OUTPATIENT)
Dept: INTERNAL MEDICINE | Facility: CLINIC | Age: 79
End: 2019-09-12

## 2019-10-09 ENCOUNTER — OFFICE VISIT - HEALTHEAST (OUTPATIENT)
Dept: INTERNAL MEDICINE | Facility: CLINIC | Age: 79
End: 2019-10-09

## 2019-10-09 DIAGNOSIS — Z23 FLU VACCINE NEED: ICD-10-CM

## 2019-10-09 DIAGNOSIS — I10 ESSENTIAL HYPERTENSION: ICD-10-CM

## 2019-10-09 LAB
ANION GAP SERPL CALCULATED.3IONS-SCNC: 8 MMOL/L (ref 5–18)
BUN SERPL-MCNC: 23 MG/DL (ref 8–28)
CALCIUM SERPL-MCNC: 10.2 MG/DL (ref 8.5–10.5)
CHLORIDE BLD-SCNC: 104 MMOL/L (ref 98–107)
CO2 SERPL-SCNC: 24 MMOL/L (ref 22–31)
CREAT SERPL-MCNC: 1.42 MG/DL (ref 0.6–1.1)
GFR SERPL CREATININE-BSD FRML MDRD: 36 ML/MIN/1.73M2
GLUCOSE BLD-MCNC: 70 MG/DL (ref 70–125)
POTASSIUM BLD-SCNC: 4.9 MMOL/L (ref 3.5–5)
SODIUM SERPL-SCNC: 136 MMOL/L (ref 136–145)

## 2019-10-09 ASSESSMENT — MIFFLIN-ST. JEOR: SCORE: 1235.65

## 2019-10-11 ENCOUNTER — AMBULATORY - HEALTHEAST (OUTPATIENT)
Dept: INTERNAL MEDICINE | Facility: CLINIC | Age: 79
End: 2019-10-11

## 2019-10-11 DIAGNOSIS — N17.9 AKI (ACUTE KIDNEY INJURY) (H): ICD-10-CM

## 2019-11-22 ENCOUNTER — COMMUNICATION - HEALTHEAST (OUTPATIENT)
Dept: INTERNAL MEDICINE | Facility: CLINIC | Age: 79
End: 2019-11-22

## 2019-11-22 DIAGNOSIS — R60.9 EDEMA, UNSPECIFIED TYPE: ICD-10-CM

## 2019-11-22 DIAGNOSIS — I10 ESSENTIAL HYPERTENSION: ICD-10-CM

## 2019-12-04 ENCOUNTER — OFFICE VISIT - HEALTHEAST (OUTPATIENT)
Dept: INTERNAL MEDICINE | Facility: CLINIC | Age: 79
End: 2019-12-04

## 2019-12-04 DIAGNOSIS — R19.7 DIARRHEA, UNSPECIFIED TYPE: ICD-10-CM

## 2019-12-04 DIAGNOSIS — F32.5 MAJOR DEPRESSIVE DISORDER, SINGLE EPISODE IN FULL REMISSION (H): ICD-10-CM

## 2019-12-04 DIAGNOSIS — N17.9 AKI (ACUTE KIDNEY INJURY) (H): ICD-10-CM

## 2019-12-04 DIAGNOSIS — I10 ESSENTIAL HYPERTENSION: ICD-10-CM

## 2019-12-04 LAB
ANION GAP SERPL CALCULATED.3IONS-SCNC: 9 MMOL/L (ref 5–18)
BUN SERPL-MCNC: 21 MG/DL (ref 8–28)
CALCIUM SERPL-MCNC: 9.7 MG/DL (ref 8.5–10.5)
CHLORIDE BLD-SCNC: 105 MMOL/L (ref 98–107)
CO2 SERPL-SCNC: 25 MMOL/L (ref 22–31)
CREAT SERPL-MCNC: 1.3 MG/DL (ref 0.6–1.1)
GFR SERPL CREATININE-BSD FRML MDRD: 40 ML/MIN/1.73M2
GLUCOSE BLD-MCNC: 97 MG/DL (ref 70–125)
POTASSIUM BLD-SCNC: 4.6 MMOL/L (ref 3.5–5)
SODIUM SERPL-SCNC: 139 MMOL/L (ref 136–145)
TSH SERPL DL<=0.005 MIU/L-ACNC: 2.07 UIU/ML (ref 0.3–5)

## 2019-12-04 ASSESSMENT — MIFFLIN-ST. JEOR: SCORE: 1253.79

## 2019-12-04 ASSESSMENT — PATIENT HEALTH QUESTIONNAIRE - PHQ9: SUM OF ALL RESPONSES TO PHQ QUESTIONS 1-9: 3

## 2019-12-05 ENCOUNTER — COMMUNICATION - HEALTHEAST (OUTPATIENT)
Dept: INTERNAL MEDICINE | Facility: CLINIC | Age: 79
End: 2019-12-05

## 2019-12-05 DIAGNOSIS — I10 ESSENTIAL HYPERTENSION: ICD-10-CM

## 2020-01-17 ENCOUNTER — RECORDS - HEALTHEAST (OUTPATIENT)
Dept: ADMINISTRATIVE | Facility: OTHER | Age: 80
End: 2020-01-17

## 2020-01-30 ENCOUNTER — COMMUNICATION - HEALTHEAST (OUTPATIENT)
Dept: INTERNAL MEDICINE | Facility: CLINIC | Age: 80
End: 2020-01-30

## 2020-01-30 DIAGNOSIS — R60.9 EDEMA, UNSPECIFIED TYPE: ICD-10-CM

## 2020-01-30 DIAGNOSIS — I10 ESSENTIAL HYPERTENSION: ICD-10-CM

## 2020-04-13 ENCOUNTER — COMMUNICATION - HEALTHEAST (OUTPATIENT)
Dept: INTERNAL MEDICINE | Facility: CLINIC | Age: 80
End: 2020-04-13

## 2020-04-13 DIAGNOSIS — I10 ESSENTIAL HYPERTENSION: ICD-10-CM

## 2020-07-03 ENCOUNTER — OFFICE VISIT - HEALTHEAST (OUTPATIENT)
Dept: INTERNAL MEDICINE | Facility: CLINIC | Age: 80
End: 2020-07-03

## 2020-07-03 DIAGNOSIS — F33.1 MODERATE EPISODE OF RECURRENT MAJOR DEPRESSIVE DISORDER (H): ICD-10-CM

## 2020-07-03 DIAGNOSIS — H57.9 EYE DISEASE: ICD-10-CM

## 2020-07-03 DIAGNOSIS — Z01.818 PREOP EXAM FOR INTERNAL MEDICINE: ICD-10-CM

## 2020-07-03 DIAGNOSIS — I10 ESSENTIAL HYPERTENSION: ICD-10-CM

## 2020-07-03 DIAGNOSIS — E55.9 VITAMIN D DEFICIENCY: ICD-10-CM

## 2020-07-03 LAB
ANION GAP SERPL CALCULATED.3IONS-SCNC: 11 MMOL/L (ref 5–18)
BASOPHILS # BLD AUTO: 0.1 THOU/UL (ref 0–0.2)
BASOPHILS NFR BLD AUTO: 1 % (ref 0–2)
BUN SERPL-MCNC: 25 MG/DL (ref 8–28)
CALCIUM SERPL-MCNC: 9.6 MG/DL (ref 8.5–10.5)
CHLORIDE BLD-SCNC: 102 MMOL/L (ref 98–107)
CO2 SERPL-SCNC: 22 MMOL/L (ref 22–31)
CREAT SERPL-MCNC: 1.38 MG/DL (ref 0.6–1.1)
EOSINOPHIL # BLD AUTO: 0.3 THOU/UL (ref 0–0.4)
EOSINOPHIL NFR BLD AUTO: 3 % (ref 0–6)
ERYTHROCYTE [DISTWIDTH] IN BLOOD BY AUTOMATED COUNT: 12 % (ref 11–14.5)
GFR SERPL CREATININE-BSD FRML MDRD: 37 ML/MIN/1.73M2
GLUCOSE BLD-MCNC: 85 MG/DL (ref 70–125)
HCT VFR BLD AUTO: 35.1 % (ref 35–47)
HGB BLD-MCNC: 12 G/DL (ref 12–16)
LYMPHOCYTES # BLD AUTO: 1.4 THOU/UL (ref 0.8–4.4)
LYMPHOCYTES NFR BLD AUTO: 15 % (ref 20–40)
MCH RBC QN AUTO: 31.6 PG (ref 27–34)
MCHC RBC AUTO-ENTMCNC: 34.2 G/DL (ref 32–36)
MCV RBC AUTO: 92 FL (ref 80–100)
MONOCYTES # BLD AUTO: 0.6 THOU/UL (ref 0–0.9)
MONOCYTES NFR BLD AUTO: 7 % (ref 2–10)
NEUTROPHILS # BLD AUTO: 6.7 THOU/UL (ref 2–7.7)
NEUTROPHILS NFR BLD AUTO: 74 % (ref 50–70)
PLATELET # BLD AUTO: 263 THOU/UL (ref 140–440)
PMV BLD AUTO: 8.3 FL (ref 7–10)
POTASSIUM BLD-SCNC: 5 MMOL/L (ref 3.5–5)
RBC # BLD AUTO: 3.79 MILL/UL (ref 3.8–5.4)
SODIUM SERPL-SCNC: 135 MMOL/L (ref 136–145)
TSH SERPL DL<=0.005 MIU/L-ACNC: 2.29 UIU/ML (ref 0.3–5)
WBC: 9 THOU/UL (ref 4–11)

## 2020-07-03 ASSESSMENT — MIFFLIN-ST. JEOR: SCORE: 1251.53

## 2020-07-06 LAB
25(OH)D3 SERPL-MCNC: 63.4 NG/ML (ref 30–80)
25(OH)D3 SERPL-MCNC: 63.4 NG/ML (ref 30–80)

## 2020-07-07 LAB
ATRIAL RATE - MUSE: 62 BPM
DIASTOLIC BLOOD PRESSURE - MUSE: NORMAL
INTERPRETATION ECG - MUSE: NORMAL
P AXIS - MUSE: 48 DEGREES
PR INTERVAL - MUSE: 148 MS
QRS DURATION - MUSE: 80 MS
QT - MUSE: 394 MS
QTC - MUSE: 399 MS
R AXIS - MUSE: -21 DEGREES
SYSTOLIC BLOOD PRESSURE - MUSE: NORMAL
T AXIS - MUSE: 53 DEGREES
VENTRICULAR RATE- MUSE: 62 BPM

## 2020-07-08 ENCOUNTER — RECORDS - HEALTHEAST (OUTPATIENT)
Dept: ADMINISTRATIVE | Facility: OTHER | Age: 80
End: 2020-07-08

## 2020-10-05 ENCOUNTER — COMMUNICATION - HEALTHEAST (OUTPATIENT)
Dept: INTERNAL MEDICINE | Facility: CLINIC | Age: 80
End: 2020-10-05

## 2020-10-08 ENCOUNTER — OFFICE VISIT - HEALTHEAST (OUTPATIENT)
Dept: INTERNAL MEDICINE | Facility: CLINIC | Age: 80
End: 2020-10-08

## 2020-10-08 DIAGNOSIS — F32.5 MAJOR DEPRESSIVE DISORDER, SINGLE EPISODE IN FULL REMISSION (H): ICD-10-CM

## 2020-10-08 DIAGNOSIS — E55.9 VITAMIN D DEFICIENCY: ICD-10-CM

## 2020-10-08 DIAGNOSIS — M17.10 ARTHRITIS OF KNEE: ICD-10-CM

## 2020-10-08 DIAGNOSIS — Z78.0 POST-MENOPAUSAL: ICD-10-CM

## 2020-10-08 DIAGNOSIS — Z00.00 ROUTINE GENERAL MEDICAL EXAMINATION AT A HEALTH CARE FACILITY: ICD-10-CM

## 2020-10-08 DIAGNOSIS — R19.7 DIARRHEA, UNSPECIFIED TYPE: ICD-10-CM

## 2020-10-08 DIAGNOSIS — R26.89 BALANCE PROBLEMS: ICD-10-CM

## 2020-10-08 DIAGNOSIS — R53.83 FATIGUE, UNSPECIFIED TYPE: ICD-10-CM

## 2020-10-08 DIAGNOSIS — Z12.31 ENCOUNTER FOR SCREENING MAMMOGRAM FOR BREAST CANCER: ICD-10-CM

## 2020-10-08 LAB
ALBUMIN SERPL-MCNC: 4 G/DL (ref 3.5–5)
ALP SERPL-CCNC: 67 U/L (ref 45–120)
ALT SERPL W P-5'-P-CCNC: 17 U/L (ref 0–45)
ANION GAP SERPL CALCULATED.3IONS-SCNC: 10 MMOL/L (ref 5–18)
AST SERPL W P-5'-P-CCNC: 23 U/L (ref 0–40)
BASOPHILS # BLD AUTO: 0.1 THOU/UL (ref 0–0.2)
BASOPHILS NFR BLD AUTO: 1 % (ref 0–2)
BILIRUB SERPL-MCNC: 0.4 MG/DL (ref 0–1)
BUN SERPL-MCNC: 31 MG/DL (ref 8–28)
CALCIUM SERPL-MCNC: 9.6 MG/DL (ref 8.5–10.5)
CHLORIDE BLD-SCNC: 105 MMOL/L (ref 98–107)
CO2 SERPL-SCNC: 23 MMOL/L (ref 22–31)
CREAT SERPL-MCNC: 1.59 MG/DL (ref 0.6–1.1)
EOSINOPHIL # BLD AUTO: 0.2 THOU/UL (ref 0–0.4)
EOSINOPHIL NFR BLD AUTO: 2 % (ref 0–6)
ERYTHROCYTE [DISTWIDTH] IN BLOOD BY AUTOMATED COUNT: 11.3 % (ref 11–14.5)
GFR SERPL CREATININE-BSD FRML MDRD: 31 ML/MIN/1.73M2
GLUCOSE BLD-MCNC: 85 MG/DL (ref 70–125)
HCT VFR BLD AUTO: 35.9 % (ref 35–47)
HGB BLD-MCNC: 12.1 G/DL (ref 12–16)
LYMPHOCYTES # BLD AUTO: 1.2 THOU/UL (ref 0.8–4.4)
LYMPHOCYTES NFR BLD AUTO: 15 % (ref 20–40)
MCH RBC QN AUTO: 31.3 PG (ref 27–34)
MCHC RBC AUTO-ENTMCNC: 33.7 G/DL (ref 32–36)
MCV RBC AUTO: 93 FL (ref 80–100)
MONOCYTES # BLD AUTO: 0.5 THOU/UL (ref 0–0.9)
MONOCYTES NFR BLD AUTO: 6 % (ref 2–10)
NEUTROPHILS # BLD AUTO: 6.4 THOU/UL (ref 2–7.7)
NEUTROPHILS NFR BLD AUTO: 77 % (ref 50–70)
PLATELET # BLD AUTO: 235 THOU/UL (ref 140–440)
PMV BLD AUTO: 7.8 FL (ref 7–10)
POTASSIUM BLD-SCNC: 4.3 MMOL/L (ref 3.5–5)
PROT SERPL-MCNC: 7.3 G/DL (ref 6–8)
RBC # BLD AUTO: 3.86 MILL/UL (ref 3.8–5.4)
SODIUM SERPL-SCNC: 138 MMOL/L (ref 136–145)
TSH SERPL DL<=0.005 MIU/L-ACNC: 2.54 UIU/ML (ref 0.3–5)
WBC: 8.4 THOU/UL (ref 4–11)

## 2020-10-08 ASSESSMENT — MIFFLIN-ST. JEOR: SCORE: 1239.05

## 2020-10-09 LAB
25(OH)D3 SERPL-MCNC: 57.6 NG/ML (ref 30–80)
25(OH)D3 SERPL-MCNC: 57.6 NG/ML (ref 30–80)
GLIADIN IGA SER-ACNC: 0.8 U/ML
GLIADIN IGG SER-ACNC: 0.7 U/ML
IGA SERPL-MCNC: 106 MG/DL (ref 65–400)
TTG IGA SER-ACNC: 0.8 U/ML
TTG IGG SER-ACNC: 5.4 U/ML

## 2020-10-09 ASSESSMENT — ANXIETY QUESTIONNAIRES
2. NOT BEING ABLE TO STOP OR CONTROL WORRYING: SEVERAL DAYS
4. TROUBLE RELAXING: SEVERAL DAYS
3. WORRYING TOO MUCH ABOUT DIFFERENT THINGS: SEVERAL DAYS
GAD7 TOTAL SCORE: 6
5. BEING SO RESTLESS THAT IT IS HARD TO SIT STILL: NOT AT ALL
6. BECOMING EASILY ANNOYED OR IRRITABLE: SEVERAL DAYS
7. FEELING AFRAID AS IF SOMETHING AWFUL MIGHT HAPPEN: SEVERAL DAYS
IF YOU CHECKED OFF ANY PROBLEMS ON THIS QUESTIONNAIRE, HOW DIFFICULT HAVE THESE PROBLEMS MADE IT FOR YOU TO DO YOUR WORK, TAKE CARE OF THINGS AT HOME, OR GET ALONG WITH OTHER PEOPLE: SOMEWHAT DIFFICULT
1. FEELING NERVOUS, ANXIOUS, OR ON EDGE: SEVERAL DAYS

## 2020-10-09 ASSESSMENT — PATIENT HEALTH QUESTIONNAIRE - PHQ9: SUM OF ALL RESPONSES TO PHQ QUESTIONS 1-9: 5

## 2020-10-11 ENCOUNTER — COMMUNICATION - HEALTHEAST (OUTPATIENT)
Dept: INTERNAL MEDICINE | Facility: CLINIC | Age: 80
End: 2020-10-11

## 2020-10-11 DIAGNOSIS — I10 ESSENTIAL HYPERTENSION: ICD-10-CM

## 2020-10-14 RX ORDER — SPIRONOLACTONE 25 MG/1
25 TABLET ORAL DAILY
Qty: 90 TABLET | Refills: 3 | Status: SHIPPED | OUTPATIENT
Start: 2020-10-14 | End: 2022-06-01

## 2020-10-16 ENCOUNTER — AMBULATORY - HEALTHEAST (OUTPATIENT)
Dept: LAB | Facility: CLINIC | Age: 80
End: 2020-10-16

## 2020-10-16 DIAGNOSIS — R19.7 DIARRHEA, UNSPECIFIED TYPE: ICD-10-CM

## 2020-10-16 LAB
C DIFF TOX B STL QL: NEGATIVE
RIBOTYPE 027/NAP1/BI: NORMAL

## 2020-10-17 LAB
SHIGA TOXIN 1: NEGATIVE
SHIGA TOXIN 2: NEGATIVE

## 2020-10-19 LAB — BACTERIA SPEC CULT: NORMAL

## 2020-10-20 ENCOUNTER — COMMUNICATION - HEALTHEAST (OUTPATIENT)
Dept: INTERNAL MEDICINE | Facility: CLINIC | Age: 80
End: 2020-10-20

## 2020-10-20 DIAGNOSIS — R19.7 DIARRHEA, UNSPECIFIED TYPE: ICD-10-CM

## 2020-11-03 ENCOUNTER — OFFICE VISIT - HEALTHEAST (OUTPATIENT)
Dept: PHYSICAL THERAPY | Facility: REHABILITATION | Age: 80
End: 2020-11-03

## 2020-11-03 DIAGNOSIS — G89.29 BILATERAL CHRONIC KNEE PAIN: ICD-10-CM

## 2020-11-03 DIAGNOSIS — R29.898 WEAKNESS OF BOTH LOWER EXTREMITIES: ICD-10-CM

## 2020-11-03 DIAGNOSIS — M25.661 DECREASED RANGE OF MOTION (ROM) OF RIGHT KNEE: ICD-10-CM

## 2020-11-03 DIAGNOSIS — M25.562 BILATERAL CHRONIC KNEE PAIN: ICD-10-CM

## 2020-11-03 DIAGNOSIS — R26.89 BALANCE PROBLEMS: ICD-10-CM

## 2020-11-03 DIAGNOSIS — M25.561 BILATERAL CHRONIC KNEE PAIN: ICD-10-CM

## 2020-11-17 ENCOUNTER — OFFICE VISIT - HEALTHEAST (OUTPATIENT)
Dept: PHYSICAL THERAPY | Facility: REHABILITATION | Age: 80
End: 2020-11-17

## 2020-11-17 DIAGNOSIS — M25.562 BILATERAL CHRONIC KNEE PAIN: ICD-10-CM

## 2020-11-17 DIAGNOSIS — M25.561 BILATERAL CHRONIC KNEE PAIN: ICD-10-CM

## 2020-11-17 DIAGNOSIS — R29.898 WEAKNESS OF BOTH LOWER EXTREMITIES: ICD-10-CM

## 2020-11-17 DIAGNOSIS — R26.89 BALANCE PROBLEMS: ICD-10-CM

## 2020-11-17 DIAGNOSIS — G89.29 BILATERAL CHRONIC KNEE PAIN: ICD-10-CM

## 2020-11-17 DIAGNOSIS — M25.661 DECREASED RANGE OF MOTION (ROM) OF RIGHT KNEE: ICD-10-CM

## 2020-11-24 ENCOUNTER — OFFICE VISIT - HEALTHEAST (OUTPATIENT)
Dept: PHYSICAL THERAPY | Facility: REHABILITATION | Age: 80
End: 2020-11-24

## 2020-11-24 DIAGNOSIS — R26.89 BALANCE PROBLEMS: ICD-10-CM

## 2020-11-24 DIAGNOSIS — M25.562 BILATERAL CHRONIC KNEE PAIN: ICD-10-CM

## 2020-11-24 DIAGNOSIS — M25.661 DECREASED RANGE OF MOTION (ROM) OF RIGHT KNEE: ICD-10-CM

## 2020-11-24 DIAGNOSIS — G89.29 BILATERAL CHRONIC KNEE PAIN: ICD-10-CM

## 2020-11-24 DIAGNOSIS — M25.561 BILATERAL CHRONIC KNEE PAIN: ICD-10-CM

## 2020-11-24 DIAGNOSIS — R29.898 WEAKNESS OF BOTH LOWER EXTREMITIES: ICD-10-CM

## 2020-11-27 ENCOUNTER — RECORDS - HEALTHEAST (OUTPATIENT)
Dept: ADMINISTRATIVE | Facility: OTHER | Age: 80
End: 2020-11-27

## 2020-12-01 ENCOUNTER — OFFICE VISIT - HEALTHEAST (OUTPATIENT)
Dept: PHYSICAL THERAPY | Facility: REHABILITATION | Age: 80
End: 2020-12-01

## 2020-12-01 DIAGNOSIS — R26.89 BALANCE PROBLEMS: ICD-10-CM

## 2020-12-01 DIAGNOSIS — R29.898 WEAKNESS OF BOTH LOWER EXTREMITIES: ICD-10-CM

## 2020-12-01 DIAGNOSIS — G89.29 BILATERAL CHRONIC KNEE PAIN: ICD-10-CM

## 2020-12-01 DIAGNOSIS — M25.561 BILATERAL CHRONIC KNEE PAIN: ICD-10-CM

## 2020-12-01 DIAGNOSIS — M25.661 DECREASED RANGE OF MOTION (ROM) OF RIGHT KNEE: ICD-10-CM

## 2020-12-01 DIAGNOSIS — M25.562 BILATERAL CHRONIC KNEE PAIN: ICD-10-CM

## 2020-12-08 ENCOUNTER — RECORDS - HEALTHEAST (OUTPATIENT)
Dept: BONE DENSITY | Facility: CLINIC | Age: 80
End: 2020-12-08

## 2020-12-08 ENCOUNTER — RECORDS - HEALTHEAST (OUTPATIENT)
Dept: ADMINISTRATIVE | Facility: OTHER | Age: 80
End: 2020-12-08

## 2020-12-08 DIAGNOSIS — Z78.0 ASYMPTOMATIC MENOPAUSAL STATE: ICD-10-CM

## 2020-12-15 ENCOUNTER — OFFICE VISIT - HEALTHEAST (OUTPATIENT)
Dept: PHYSICAL THERAPY | Facility: REHABILITATION | Age: 80
End: 2020-12-15

## 2020-12-15 DIAGNOSIS — R26.89 BALANCE PROBLEMS: ICD-10-CM

## 2020-12-15 DIAGNOSIS — M25.562 BILATERAL CHRONIC KNEE PAIN: ICD-10-CM

## 2020-12-15 DIAGNOSIS — M25.561 BILATERAL CHRONIC KNEE PAIN: ICD-10-CM

## 2020-12-15 DIAGNOSIS — R29.898 WEAKNESS OF BOTH LOWER EXTREMITIES: ICD-10-CM

## 2020-12-15 DIAGNOSIS — M25.661 DECREASED RANGE OF MOTION (ROM) OF RIGHT KNEE: ICD-10-CM

## 2020-12-15 DIAGNOSIS — G89.29 BILATERAL CHRONIC KNEE PAIN: ICD-10-CM

## 2020-12-24 ENCOUNTER — OFFICE VISIT - HEALTHEAST (OUTPATIENT)
Dept: PHYSICAL THERAPY | Facility: REHABILITATION | Age: 80
End: 2020-12-24

## 2020-12-24 DIAGNOSIS — R29.898 WEAKNESS OF BOTH LOWER EXTREMITIES: ICD-10-CM

## 2020-12-24 DIAGNOSIS — M25.561 BILATERAL CHRONIC KNEE PAIN: ICD-10-CM

## 2020-12-24 DIAGNOSIS — M25.661 DECREASED RANGE OF MOTION (ROM) OF RIGHT KNEE: ICD-10-CM

## 2020-12-24 DIAGNOSIS — G89.29 BILATERAL CHRONIC KNEE PAIN: ICD-10-CM

## 2020-12-24 DIAGNOSIS — M25.562 BILATERAL CHRONIC KNEE PAIN: ICD-10-CM

## 2020-12-24 DIAGNOSIS — R26.89 BALANCE PROBLEMS: ICD-10-CM

## 2020-12-29 ENCOUNTER — COMMUNICATION - HEALTHEAST (OUTPATIENT)
Dept: PHYSICAL THERAPY | Facility: REHABILITATION | Age: 80
End: 2020-12-29

## 2020-12-29 ENCOUNTER — OFFICE VISIT - HEALTHEAST (OUTPATIENT)
Dept: PHYSICAL THERAPY | Facility: REHABILITATION | Age: 80
End: 2020-12-29

## 2020-12-29 DIAGNOSIS — R26.89 BALANCE PROBLEMS: ICD-10-CM

## 2020-12-29 DIAGNOSIS — G89.29 BILATERAL CHRONIC KNEE PAIN: ICD-10-CM

## 2020-12-29 DIAGNOSIS — M25.661 DECREASED RANGE OF MOTION (ROM) OF RIGHT KNEE: ICD-10-CM

## 2020-12-29 DIAGNOSIS — R29.898 WEAKNESS OF BOTH LOWER EXTREMITIES: ICD-10-CM

## 2020-12-29 DIAGNOSIS — M25.562 BILATERAL CHRONIC KNEE PAIN: ICD-10-CM

## 2020-12-29 DIAGNOSIS — M25.561 BILATERAL CHRONIC KNEE PAIN: ICD-10-CM

## 2021-01-05 ENCOUNTER — OFFICE VISIT - HEALTHEAST (OUTPATIENT)
Dept: PHYSICAL THERAPY | Facility: REHABILITATION | Age: 81
End: 2021-01-05

## 2021-01-05 DIAGNOSIS — M25.661 DECREASED RANGE OF MOTION (ROM) OF RIGHT KNEE: ICD-10-CM

## 2021-01-05 DIAGNOSIS — M25.562 BILATERAL CHRONIC KNEE PAIN: ICD-10-CM

## 2021-01-05 DIAGNOSIS — M25.561 BILATERAL CHRONIC KNEE PAIN: ICD-10-CM

## 2021-01-05 DIAGNOSIS — R29.898 WEAKNESS OF BOTH LOWER EXTREMITIES: ICD-10-CM

## 2021-01-05 DIAGNOSIS — G89.29 BILATERAL CHRONIC KNEE PAIN: ICD-10-CM

## 2021-01-05 DIAGNOSIS — R26.89 BALANCE PROBLEMS: ICD-10-CM

## 2021-01-12 ENCOUNTER — OFFICE VISIT - HEALTHEAST (OUTPATIENT)
Dept: PHYSICAL THERAPY | Facility: REHABILITATION | Age: 81
End: 2021-01-12

## 2021-01-12 DIAGNOSIS — R29.898 WEAKNESS OF BOTH LOWER EXTREMITIES: ICD-10-CM

## 2021-01-12 DIAGNOSIS — M25.562 BILATERAL CHRONIC KNEE PAIN: ICD-10-CM

## 2021-01-12 DIAGNOSIS — M25.661 DECREASED RANGE OF MOTION (ROM) OF RIGHT KNEE: ICD-10-CM

## 2021-01-12 DIAGNOSIS — G89.29 BILATERAL CHRONIC KNEE PAIN: ICD-10-CM

## 2021-01-12 DIAGNOSIS — M25.561 BILATERAL CHRONIC KNEE PAIN: ICD-10-CM

## 2021-01-12 DIAGNOSIS — R26.89 BALANCE PROBLEMS: ICD-10-CM

## 2021-02-02 ENCOUNTER — RECORDS - HEALTHEAST (OUTPATIENT)
Dept: MAMMOGRAPHY | Facility: CLINIC | Age: 81
End: 2021-02-02

## 2021-02-02 DIAGNOSIS — Z12.31 ENCOUNTER FOR SCREENING MAMMOGRAM FOR MALIGNANT NEOPLASM OF BREAST: ICD-10-CM

## 2021-02-05 ENCOUNTER — AMBULATORY - HEALTHEAST (OUTPATIENT)
Dept: NURSING | Facility: CLINIC | Age: 81
End: 2021-02-05

## 2021-02-26 ENCOUNTER — AMBULATORY - HEALTHEAST (OUTPATIENT)
Dept: NURSING | Facility: CLINIC | Age: 81
End: 2021-02-26

## 2021-03-06 ENCOUNTER — COMMUNICATION - HEALTHEAST (OUTPATIENT)
Dept: INTERNAL MEDICINE | Facility: CLINIC | Age: 81
End: 2021-03-06

## 2021-03-06 DIAGNOSIS — I10 ESSENTIAL HYPERTENSION: ICD-10-CM

## 2021-03-29 ENCOUNTER — COMMUNICATION - HEALTHEAST (OUTPATIENT)
Dept: INTERNAL MEDICINE | Facility: CLINIC | Age: 81
End: 2021-03-29

## 2021-03-29 DIAGNOSIS — I10 ESSENTIAL HYPERTENSION: ICD-10-CM

## 2021-03-31 RX ORDER — METOPROLOL SUCCINATE 50 MG/1
50 TABLET, EXTENDED RELEASE ORAL DAILY
Qty: 90 TABLET | Refills: 2 | Status: SHIPPED | OUTPATIENT
Start: 2021-03-31 | End: 2022-01-09

## 2021-04-08 ENCOUNTER — RECORDS - HEALTHEAST (OUTPATIENT)
Dept: ADMINISTRATIVE | Facility: OTHER | Age: 81
End: 2021-04-08

## 2021-04-29 ENCOUNTER — RECORDS - HEALTHEAST (OUTPATIENT)
Dept: ADMINISTRATIVE | Facility: OTHER | Age: 81
End: 2021-04-29

## 2021-05-03 ENCOUNTER — COMMUNICATION - HEALTHEAST (OUTPATIENT)
Dept: INTERNAL MEDICINE | Facility: CLINIC | Age: 81
End: 2021-05-03

## 2021-05-03 DIAGNOSIS — R60.9 EDEMA, UNSPECIFIED TYPE: ICD-10-CM

## 2021-05-03 DIAGNOSIS — I10 ESSENTIAL HYPERTENSION: ICD-10-CM

## 2021-05-26 ASSESSMENT — PATIENT HEALTH QUESTIONNAIRE - PHQ9: SUM OF ALL RESPONSES TO PHQ QUESTIONS 1-9: 3

## 2021-05-27 ASSESSMENT — PATIENT HEALTH QUESTIONNAIRE - PHQ9: SUM OF ALL RESPONSES TO PHQ QUESTIONS 1-9: 5

## 2021-05-27 NOTE — TELEPHONE ENCOUNTER
Refill Approved    Rx renewed per Medication Renewal Policy. Medication was last renewed on 11/1/18 .    Kyra Rodriguez, [facility] [place] Connection Triage/Med Refill 4/6/2019     Requested Prescriptions   Pending Prescriptions Disp Refills     metoprolol succinate (TOPROL-XL) 50 MG 24 hr tablet [Pharmacy Med Name: METOPROLOL SUCC ER 50MG** 50 TAB] 90 tablet 2     Sig: TAKE 1 TABLET (50 MG TOTAL) BY MOUTH DAILY.    Beta-Blockers Refill Protocol Passed - 4/5/2019  1:17 PM       Passed - PCP or prescribing provider visit in past 12 months or next 3 months    Last office visit with prescriber/PCP: 9/18/2018 Lupe Quintero MD OR same dept: 12/5/2018 Benton Moore MD OR same specialty: 12/5/2018 Benton Moore MD  Last physical: 2/7/2018 Last MTM visit: Visit date not found   Next visit within 3 mo: Visit date not found  Next physical within 3 mo: Visit date not found  Prescriber OR PCP: Lupe Quintero MD  Last diagnosis associated with med order: 1. Essential hypertension  - metoprolol succinate (TOPROL-XL) 50 MG 24 hr tablet [Pharmacy Med Name: METOPROLOL SUCC ER 50MG** 50 TAB]; TAKE 1 TABLET (50 MG TOTAL) BY MOUTH DAILY.  Dispense: 90 tablet; Refill: 2    If protocol passes may refill for 12 months if within 3 months of last provider visit (or a total of 15 months).            Passed - Blood pressure filed in past 12 months    BP Readings from Last 1 Encounters:   03/01/19 (!) 160/96

## 2021-05-28 ASSESSMENT — ANXIETY QUESTIONNAIRES: GAD7 TOTAL SCORE: 6

## 2021-05-28 NOTE — PROGRESS NOTES
[facility] Follow Up Note    Assessment/Plan:    1. Hypertension  Blood pressure in the office have been moderately elevated at 140-160 and at home severely elevated at 1 70-1 80.  She will continue losartan at bedtime and metoprolol in the morning and will add amlodipine 5 mg at bedtime.  In the future we can taper amlodipine up if needed and consider switching losartan to Spironolactone if still having difficulty regulating her blood pressure.  She will call with updates on her readings next week.  I will follow-up with her in a month to repeat blood work and blood pressure in the office and she will bring her blood pressure cuff with her at that time.  Currently she denies any chest pains or shortness of breath  - amLODIPine (NORVASC) 5 MG tablet; Take 1 tablet (5 mg total) by mouth daily.  Dispense: 30 tablet; Refill: 4    2. Major Depression, Single Episode In Remission  She is seeing a psychologist and psychiatrist.  I believe this is also contributing to her hypertension.  - Thyroid Stimulating Hormone (TSH)  - Vitamin B12  - fluvoxaMINE (LUVOX) 25 MG tablet; 2 tabs in the morning and 3 tabs in the evening; Refill: 0  - PARoxetine (PAXIL) 10 MG tablet; One tab in the morning and 2 tabs in the evening; Refill: 0    3. Abnormal TSH  TSH was slightly elevated at 5.5.  We will repeat it again today and if it continues to be abnormal I would put her on Synthroid replacement given her history of hypertension, depression and memory problems.  - Thyroid Stimulating Hormone (TSH)    4. Memory changes  We will do formal testing once her blood pressure and thyroid levels are optimized.  - Thyroid Stimulating Hormone (TSH)  - Vitamin B12    5. Rosacea  - metroNIDAZOLE (NORITRATE) 1 % cream; Apply twice a day  Dispense: 60 g; Refill: 2      Bernda Godoy MD    Chief Complaint:  Chief Complaint   Patient presents with     Hypertension     follow up and discuss med       History of Present  Illness:  Ellen is a 78 y.o. female with mild memory problems, anxiety, depression and OCD, high blood pressure, hyperlipidemia, aortic stenosis, history of collagenous colitis and IBS, osteopenia.  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  Currently she is here for follow-up on blood pressure since it was noted to be slightly elevated on her last wellness exam in March.    Patient is currently on losartan 100 mg at bedtime and metoprolol 50 mg in the morning.  She brought her readings of blood pressures which she usually checks in the morning after taking medications and it is consistently in 170-180 range.  Heart rate usually is in the 70s.  She sleeps well and denies snoring.  From record review at our office it has been in 140-160 range.  She denies any urinary incontinence but is prone to diarrhea and frequently has to take loperamide.  Previous sodium was 137 and potassium 4.3.  Currently discussed that she would benefit from addition of amlodipine which can also help decrease diarrhea.  She will take it in the evening to improve her morning blood pressures.  She will continue measuring blood pressures at home and let me know if her numbers in 5 days.  If they continue to be suboptimally controlled we can consider switching her losartan to Spironolactone and increasing amlodipine.    Currently patient does have significant depression.  She has some family problems which she did not want to discuss today.  She does see a psychiatrist and currently is on Paxil and Luvox and also sees psychologist every 2 weeks.  Stress is contributing to her hypertension as well.    She also concerned about her memory because her sister mentioned that it has been getting worse.  Patient has been in charge of the bills of the house and has not had significant problems but it does take her some time to complete.  Most recent blood work did show slightly elevated TSH at 5.5.  We will repeat it again today.  Discussed that if  it still elevated I would put her on Synthroid since that we will help her with mood and blood pressure and possibly memory.  We can subsequently have memory testing done once her blood pressure and Synthroid are regulated.      Review of Systems:  A comprehensive review of systems was performed and was otherwise negative    PFSH:  Social History: Reviewed  Social History     Tobacco Use   Smoking Status Never Smoker   Smokeless Tobacco Never Used     Social History     Social History Narrative    She is .  They do not have children.  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       Past History: Reviewed  Current Outpatient Medications   Medication Sig Dispense Refill     acetaminophen 325 mg cap Take by mouth.       ascorbic acid (ASCORBIC ACID WITH MINA HIPS) 500 MG tablet Take 500 mg by mouth daily.       b complex vitamins (B-COMPLEX) tablet Take 1 tablet by mouth daily.       calcium carbonate-vitamin D3 600 mg calcium- 200 unit cap Take 1 tablet by mouth daily.       cholecalciferol, vitamin D3, (VITAMIN D3) 2,000 unit Tab Take 1 tablet by mouth daily.       erythromycin with ethanol (EMGEL) 2 % gel Apply topically daily.       FLUOCINOLONE ACETONIDE (FLUOCINOLONE TOP) Apply topically.       fluocinonide (LIDEX) 0.05 % cream        LACTOBACILLUS ACIDOPHILUS (PROBIOTIC ORAL) Take 1 capsule by mouth daily.       loperamide (IMODIUM) 2 mg capsule Take 2 mg by mouth daily.       losartan (COZAAR) 100 MG tablet TAKE 1 TABLET (100 MG TOTAL) BY MOUTH DAILY. 90 tablet 2     lysine (L-LYSINE) 500 mg cap Take 2 capsules by mouth daily.        metoprolol succinate (TOPROL-XL) 50 MG 24 hr tablet TAKE 1 TABLET (50 MG TOTAL) BY MOUTH DAILY. 90 tablet 3     MULTIVITAMIN ORAL Take 1 tablet by mouth daily.       omega-3 fatty acids 1,000 mg cap Take 1 capsule by mouth daily.       amLODIPine (NORVASC) 5 MG tablet Take 1 tablet (5 mg total) by mouth daily. 30 tablet  "4     fluvoxaMINE (LUVOX) 25 MG tablet 2 tabs in the morning and 3 tabs in the evening  0     metroNIDAZOLE (NORITRATE) 1 % cream Apply twice a day 60 g 2     PARoxetine (PAXIL) 10 MG tablet One tab in the morning and 2 tabs in the evening  0     No current facility-administered medications for this visit.        Family History: Reviewed    Physical Exam:    Vitals:    05/08/19 1302   BP: 152/70   Patient Site: Left Arm   Patient Position: Sitting   Cuff Size: Adult Regular   Pulse: 64   SpO2: 97%   Weight: 159 lb (72.1 kg)   Height: 5' 7\" (1.702 m)     Wt Readings from Last 3 Encounters:   05/08/19 159 lb (72.1 kg)   03/01/19 161 lb 3.2 oz (73.1 kg)   12/05/18 157 lb (71.2 kg)     Body mass index is 24.9 kg/m .    Constitutional:  Reveals a pleasant female.  Vitals:  Per nursing notes.  HEENT:No cervical LAD, no thyromegaly,  conjunctiva is pink, no scleral icterus, TMs are visualized and normal bl, oropharynx is clear, no exudates,   Cardiac:  Regular rate and rhythm,no murmurs, rubs, or gallops.  Legs without edema. Palpation of the radial pulse regular.  Lungs: Clear to auscultation bl.  Respiratory effort normal.  Abdomen:positive BS, soft, nontender, nondistended.  No hepato-splenomagaly  Rheumatologic: Normal joints and nails of the hands.  Neurologic:  Cranial nerves II-XII intact.     Psychiatric: affect is down, she is tearful talking about her family, memory intact but was not formally tested today.      Data Review:    Analysis and Summary of Old Records (2): yes      Records Requested (1): no      Other History Summarized (from other people in the room) (2): no    Radiology Tests Summarized (XRAY/CT/MRI/DXA) (1): no    Labs Reviewed (1): yes    Medicine Tests Reviewed (EKG/ECHO/COLONOSCOPY/EGD) (1): no    Independent Review of EKG or X-RAY (2): no      "

## 2021-05-28 NOTE — TELEPHONE ENCOUNTER
BP readings. Patient was told to call in BP , per Dr. Godoy     Since last Wednesday.05/08/2019  May 10 0808am  178/102    May 11 10 am  158/86    May 12  815am  151/82    May 13  0823 157/84    May 14  0908  157/86      Yuli Lopez RN  [facility] Triage/refill nurse

## 2021-05-28 NOTE — TELEPHONE ENCOUNTER
Patient Returning Call  Reason for call:  Results  Information relayed to patient:  Writer read the following to patient per Dr Reynoso: Blood pressure readings are consistently elevated despite adding amlodipine to losartan and metoprolol.  I do not see any listed intolerance to hydrochlorothiazide or history of gout.  Check with the patient if she has used HCTZ in the past.  If not, add HCTZ 12.5 mg in the a.m.  See Dr. Godoy for blood pressure recheck in several weeks  Patient has additional questions:  No  If YES, what are your questions/concerns:  NA  Okay to leave a detailed message?: No call back needed

## 2021-05-28 NOTE — PATIENT INSTRUCTIONS - HE
1. Add Amlodipine 5 mg in the evening with your Losartan  Continue b/p monitoring at home. Bring b/p cuff to next visit with you.     Leave me a message on Monday with b/p readins.     2. Thyroid blood work was slightly abnormal (slightly hypoactive thyroid). We will check it again today.    3. Once b/p and thyroid is regulated we can do memory testing then.     4. Try metronidazole cream for rosacea    5. Try OTC Clotrimazole or Myconazole cream for around the mouth

## 2021-05-28 NOTE — TELEPHONE ENCOUNTER
Please let pt know her Thyroid levels are back to normal. I also checked B12 levels and it was good.    Let me know home b/p readings on Monday to see if new b/p medication is helping.

## 2021-05-28 NOTE — TELEPHONE ENCOUNTER
Blood pressure readings are consistently elevated despite adding amlodipine to losartan and metoprolol.  I do not see any listed intolerance to hydrochlorothiazide or history of gout.  Check with the patient if she has used HCTZ in the past.  If not, add HCTZ 12.5 mg in the a.m.  See Dr. Godoy for blood pressure recheck in several weeks

## 2021-05-29 ENCOUNTER — RECORDS - HEALTHEAST (OUTPATIENT)
Dept: ADMINISTRATIVE | Facility: CLINIC | Age: 81
End: 2021-05-29

## 2021-05-29 NOTE — PROGRESS NOTES
[facility]    Name:   Ellen Felipe  : 1940  Facility:   [facility] [[id number]]   Room:   Code Status: FULL CODE -   Fac type:   SNF (Skilled Nursing Facility, TCU) -     CHIEF COMPLAINT / REASON FOR VISIT:  Chief Complaint   Patient presents with     Discharge Summary     TCU discharge after hospitalization for weakness, UTI with gram-negative bacteremia, hyponatremia, and hypokalemia.      [facility] from 2019 until 06/10/2019 (UTI with gram-negative bacteremia and encephalopathy)  [facility] 2019      HPI: Ellen is a 78 y.o. female mild memory problems (patient-endorsed), generalized anxiety disorder, depression and OCD (on both paroxetine and fluvoxamine), hypertension, hyperlipidemia, aortic stenosis, history of collagenous colitis and IBS, osteopenia.      She was admitted to the hospital on 2019 for weakness and fever and was found to have hyponatremia, hypokalemia, and a urinary tract infection, and was started on antibiotics while her hydrochlorothiazide was stopped.  The urine culture grew E. coli, and a blood culture was also positive for the same.  Antibiotics were switched to ciprofloxacin with a 10-day course, and at the time of discharge, she had stabilized, and a follow-up blood culture was not providing any growth.  There was a mildly elevated WBC count; however, she was afebrile, her blood pressure was stable, she had no tachycardia, and she was feeling comfortable.  Hydrochlorothiazide was discontinued.  Recommendation called for labs to be checked here.      TCU ISSUES    Primary diagnosis: Obtained a follow-up BMP and CBC with differential.  The latter does show that the white count is coming down.  The BMP shows a normal sodium level at this time.  I suspect that her hyponatremia may be due to (multiple) SSRI usage.    Depression/obsessive-compulsive disorder: It is  "immediately obvious that the patient suffers from anxiety.  She states, \"I struggle some with depression and anxiety.\"  Of particular note is that she is receiving 2 SSRIs, fluvoxamine for OCD (she calls it \"ruminations\" and finds the medication to be effective) and paroxetine for depression and anxiety.  She is seen by both psychiatry and psychology.  She is also on a variety of herbals/vitamins.      Hypertension: Well managed on losartan (100 mg daily) and metoprolol succinate (50 mg daily).    Discharge planning: She has a care conference today and is expected to discharge on 2019, going home with her .    ROS: She feels much improved.  No headaches or chest pains, coughing or congestion, nausea or vomiting, dizziness or dyspnea, dysuria, constipation or diarrhea, difficulty chewing or swallowing, or any integumentary issues.    Past Medical History:   Diagnosis Date     Depression      Hyperlipidemia      Hypertension     Created by Conversion  Replacement Utility updated for latest IMO load     IBS (irritable bowel syndrome)      OCD (obsessive compulsive disorder) 4/10/2015     OCD (obsessive compulsive disorder)               Family History   Problem Relation Age of Onset     Heart failure Mother          age 96     Heart failure Father          age 85     Diabetes type II Sister      Diabetes type II Brother      Diabetes type II Brother      Social History     Socioeconomic History     Marital status:      Spouse name: Not on file     Number of children: Not on file     Years of education: Not on file     Highest education level: Not on file   Occupational History     Not on file   Social Needs     Financial resource strain: Not on file     Food insecurity:     Worry: Not on file     Inability: Not on file     Transportation needs:     Medical: Not on file     Non-medical: Not on file   Tobacco Use     Smoking status: Never Smoker     Smokeless tobacco: Never Used   Substance " and Sexual Activity     Alcohol use: Yes     Alcohol/week: 0.5 oz     Types: 1 Standard drinks or equivalent per week     Comment: 1 glass of red wine     Drug use: No     Sexual activity: Not on file   Lifestyle     Physical activity:     Days per week: Not on file     Minutes per session: Not on file     Stress: Not on file   Relationships     Social connections:     Talks on phone: Not on file     Gets together: Not on file     Attends [employer] service: Not on file     Active member of club or organization: Not on file     Attends meetings of clubs or organizations: Not on file     Relationship status: Not on file     Intimate partner violence:     Fear of current or ex partner: Not on file     Emotionally abused: Not on file     Physically abused: Not on file     Forced sexual activity: Not on file   Other Topics Concern     Not on file   Social History Narrative    She is .  They do not have children.  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       MEDICATIONS: Reviewed from the MAR, physician orders, and/or earlier progress notes.  Current Outpatient Medications   Medication Sig     acetaminophen (TYLENOL) 325 MG tablet Take 2 tablets (650 mg total) by mouth every 6 (six) hours as needed.     amLODIPine (NORVASC) 5 MG tablet Take 1 tablet (5 mg total) by mouth daily.     amoxicillin (AMOXIL) 500 MG capsule Take 2,000 mg by mouth see administration instructions. Take one hour prior to dental appointments as directed.     ascorbic acid (ASCORBIC ACID WITH MINA HIPS) 500 MG tablet Take 500 mg by mouth daily.     b complex vitamins (B-COMPLEX) tablet Take 1 tablet by mouth daily.     CALCIUM-VITAMIN D3 ORAL Take 1 tablet by mouth daily.     cholecalciferol, vitamin D3, (VITAMIN D3) 2,000 unit Tab Take 2,000 Units by mouth daily.            ciprofloxacin HCl (CIPRO) 500 MG tablet Take 1 tablet (500 mg total) by mouth 2 times a day at 6:00 am and 4:00  "pm for 10 days.     docosahexanoic acid/epa (FISH OIL ORAL) Take 1 capsule by mouth daily.     fluvoxaMINE (LUVOX) 25 MG tablet 2 tabs in the morning and 3 tabs in the evening     LACTOBACILLUS ACIDOPHILUS (PROBIOTIC ORAL) Take 1 capsule by mouth daily.     loperamide (IMODIUM A-D) 2 mg tablet Take 2 mg by mouth daily.     losartan (COZAAR) 100 MG tablet TAKE 1 TABLET (100 MG TOTAL) BY MOUTH DAILY.     lysine (L-LYSINE) 500 mg cap Take 2 capsules by mouth daily.      metoprolol succinate (TOPROL-XL) 50 MG 24 hr tablet TAKE 1 TABLET (50 MG TOTAL) BY MOUTH DAILY.     MULTIVITAMIN ORAL Take 1 tablet by mouth daily.     PARoxetine (PAXIL) 10 MG tablet One tab in the morning and 2 tabs in the evening     ALLERGIES:   Allergies   Allergen Reactions     Sertraline Diarrhea     DIET: Regular, regular texture, thin liquids.    Vitals:    06/17/19 1333   BP: 118/65   Pulse: 72   Resp: 18   Temp: 97.6  F (36.4  C)   SpO2: 97%   Weight: 159 lb 12.8 oz (72.5 kg)   Height: 5' 8\" (1.727 m)     Body mass index is 24.3 kg/m .    EXAMINATION:   General: Pleasant, alert, and conversant -- though mildly anxious -- elderly female, sitting in a recliner, having breakfast.  Head: Normocephalic and atraumatic.   Eyes: PERRLA, sclerae clear.   ENT: Moist oral mucosa.  She has her own teeth.  No rhinorrhea or nasal discharge.  Hearing is unimpaired.  Cardiovascular: Regular rate and rhythm with a 2/6 BUFFY at the LSB.   Respiratory: Lungs clear to auscultation bilaterally.   Abdomen: Soft and nontender.   Musculoskeletal/Extremities: Age-related degenerative joint disease.  No peripheral edema.  Integument: No rashes, clinically significant lesions, or skin breakdown.   Cognitive/Psychiatric: Alert and oriented with an apparent anxiety component.    DIAGNOSTICS:   Results for orders placed or performed in visit on 06/13/19   Basic Metabolic Panel   Result Value Ref Range    Sodium 136 136 - 145 mmol/L    Potassium 4.3 3.5 - 5.0 mmol/L    " Chloride 100 98 - 107 mmol/L    CO2 26 22 - 31 mmol/L    Anion Gap, Calculation 10 5 - 18 mmol/L    Glucose 101 70 - 125 mg/dL    Calcium 9.9 8.5 - 10.5 mg/dL    BUN 18 8 - 28 mg/dL    Creatinine 0.90 0.60 - 1.10 mg/dL    GFR MDRD Af Amer >60 >60 mL/min/1.73m2    GFR MDRD Non Af Amer >60 >60 mL/min/1.73m2     Lab Results   Component Value Date    WBC 13.2 (H) 06/13/2019    HGB 12.0 06/13/2019    HCT 35.2 06/13/2019    MCV 94 06/13/2019     (H) 06/13/2019     Estimated Creatinine Clearance: 59.9 mL/min (by C-G formula based on SCr of 0.9 mg/dL).  Lab Results   Component Value Date    TSH 2.06 05/08/2019     ASSESSMENT/Plan:      ICD-10-CM    1. History of UTI gram-negative bacteremia with encephalopathy Z87.898     White count still elevated when last checked, although it has declined.   2. Hyponatremia E87.1     Possibly related to SSRI usage.   3. Generalized anxiety disorder F41.1     Treated with paroxetine.  Stable.   4. Obsessive-compulsive disorder, unspecified type F42.9     Treated with fluvoxamine.  Stable.   5. Major Depression, Single Episode In Remission F32.5    6. Irritable bowel syndrome with diarrhea K58.0    7. Hypertension I10     Well-managed on current regimen of losartan and metoprolol succinate.     The patient is, or has been, under my care and I have initiated the establishment of the plan of care. This patient will be followed by a physician who will periodically review the plan of care.  Initial follow-up should be within 7-10 days.    Approximate time spent with this patient was greater than 30 minutes with greater than 50% spent in discussions regarding labs and other issues prior to discharge.    The above has been created using voice recognition software. Please be aware that this may unintentionally  produce inaccuracies and/or nonsensical sentences.      Electronically signed by: Darron Tang CNP

## 2021-05-29 NOTE — PROGRESS NOTES
[facility] Patient Outreach:     Discharge Date::  6/19/19      Reason for TCU stay (discharge diagnosis)::  Weakness, hyponatremia, hypokalemia, UTI      Are you feeling better, the same or worse since your discharge?:  Patient is feeling better          As part of your discharge plan, did they discuss home care with you?: No            Did you receive any new medications, or was there a change to your medications?: No            Do you have any follow up visits scheduled with your PCP or Specialist?:  Yes, with PCP      (RN) Is it scheduled soon enough (3-5 days)?: Yes

## 2021-05-29 NOTE — PROGRESS NOTES
[facility]      Facility:    [facility] [[id number]]    Code Status: FULL CODE   [facility] 6/6 through 6/10/2019      Chief Complaint/Reason for Visit:  Chief Complaint   Patient presents with     H & P       HPI:   Ellen is a 78 y.o. female with past history of  - Aortic stenosis  -Hypertension  -Collagenous colitis  -Irritable bowel syndrome  -Generalized anxiety disorder since her early 20s  -OCD  - Hypercholesterolemia.    Been having trouble with high blood pressures: In the clinic amlodipine was added.  By phone call hydrochlorothiazide was added on to her regimen about a week later.  She developed progressive weakness at home.  She was seen at the clinic but looked ill, and was sent to the emergency department.  Lab work-up found hyponatremia ( 123) hypokalemia (3.0) HCTZ was stopped and replacements/IVF given .     She also had a positive urinalysis, and urine culture ultimately grew E. coli.  Blood cultures were also positive for E. coli..  She was on ciprofloxacin for the infections.  In retrospect, she did have some urinary incontinence, and was feeling a low-grade temperature, but had not previously had a bladder infection did not recognize it as a significant finding.    Her labs improved, she was evaluated by therapies and felt to need transitional care for further strengthening prior to discharge to home    Past Medical History:  Past Medical History:   Diagnosis Date     Depression      Hyperlipidemia      Hypertension     Created by Conversion  Replacement Utility updated for latest IMO load     IBS (irritable bowel syndrome)      OCD (obsessive compulsive disorder) 4/10/2015           Surgical History:  Past Surgical History:   Procedure Laterality Date     MS REMOVE TONSILS/ADENOIDS,<11 Y/O      Description: Tonsillectomy With Adenoidectomy;  Recorded: 05/27/2010;       Family History:   Family History   Problem Relation Age of Onset     Heart  failure Mother          age 96     Heart failure Father          age 85     Diabetes type II Sister      Diabetes type II Brother      Diabetes type II Brother        Social History:    Social History     Socioeconomic History     Marital status:      Spouse name: Not on file     Number of children: None     Years of education: Not on file     Highest education level:  Graduate   Occupational History       [employer]   Social Needs     Financial resource strain: Not on file     Food insecurity:     Worry: Not on file     Inability: Not on file     Transportation needs:     Medical: Not on file     Non-medical: Not on file   Tobacco Use     Smoking status: Never Smoker     Smokeless tobacco: Never Used   Substance and Sexual Activity     Alcohol use: Yes     Alcohol/week: 0.5 oz     Types: 1 Standard drinks or equivalent per week     Comment: 1 glass of red wine     Drug use: No     Sexual activity: Not on file   Lifestyle     Physical activity:     Days per week: Not on file     Minutes per session: Not on file     Stress: Not on file   Relationships     Social connections:     Talks on phone: Not on file     Gets together: Not on file     Attends [employer] service: Not on file     Active member of club or organization: Not on file     Attends meetings of clubs or organizations: Not on file     Relationship status: Not on file     Intimate partner violence:     Fear of current or ex partner: Not on file     Emotionally abused: Not on file     Physically abused: Not on file     Forced sexual activity: Not on file   Other Topics Concern     Not on file   Social History Narrative    She is .  They do not have children.  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          Review of Systems   She is feeling stronger, but not yet at her baseline.  Her whole adult life she has had anxiety with  catia, sees a psychiatrist every 3 to 4 months.  Her psychiatric meds including 2 SSRIs are from her psychiatrist.  She is more tired than her baseline, needing a cane at this time  She stays up late at night, gets up late in the morning.  Her 's on the opposite schedule.   She has many questions about Meals on Wheels, doing pre-exercises for an upcoming total knee arthroplasty.  The remainder of the comprehensive review of systems is negative     Blood pressure 112/68, pulse 80, temperature 98.6  F (37  C), resp. rate 16, weight 159 lb 12.8 oz (72.5 kg), SpO2 98 %, not currently breastfeeding.      Physical Exam   Constitutional: She is oriented to person, place, and time. She appears well-nourished. No distress.   HENT:   Mouth/Throat: Oropharynx is clear and moist.   Eyes: Conjunctivae and EOM are normal.   Cardiovascular: Regular rhythm and normal heart sounds.   No murmur heard.  Pulmonary/Chest: Breath sounds normal. She has no rales.   Abdominal: Soft. Bowel sounds are normal. There is no tenderness.   Musculoskeletal: Normal range of motion. She exhibits edema. She exhibits no tenderness.   Lymphadenopathy:     She has no cervical adenopathy.   Neurological: She is alert and oriented to person, place, and time. Coordination normal.   Skin: Skin is warm and dry.   Psychiatric: She has a normal mood and affect. Her behavior is normal. Thought content normal.     Allergies   Allergen Reactions     Sertraline Diarrhea       Medication List:  Current Outpatient Medications   Medication Sig     acetaminophen (TYLENOL) 325 MG tablet Take 2 tablets (650 mg total) by mouth every 6 (six) hours as needed.     amLODIPine (NORVASC) 5 MG tablet Take 1 tablet (5 mg total) by mouth daily.     amoxicillin (AMOXIL) 500 MG capsule Take 2,000 mg by mouth see administration instructions. Take one hour prior to dental appointments as directed.     ascorbic acid (ASCORBIC ACID WITH MINA HIPS) 500 MG tablet Take 500 mg  by mouth daily.     b complex vitamins (B-COMPLEX) tablet Take 1 tablet by mouth daily.     CALCIUM-VITAMIN D3 ORAL Take 1 tablet by mouth daily.     cholecalciferol, vitamin D3, (VITAMIN D3) 2,000 unit Tab Take 2,000 Units by mouth daily.            ciprofloxacin HCl (CIPRO) 500 MG tablet Take 1 tablet (500 mg total) by mouth 2 times a day at 6:00 am and 4:00 pm for 10 days.     docosahexanoic acid/epa (FISH OIL ORAL) Take 1 capsule by mouth daily.     fluvoxaMINE (LUVOX) 25 MG tablet 2 tabs in the morning and 3 tabs in the evening     LACTOBACILLUS ACIDOPHILUS (PROBIOTIC ORAL) Take 1 capsule by mouth daily.     loperamide (IMODIUM A-D) 2 mg tablet Take 2 mg by mouth daily.     losartan (COZAAR) 100 MG tablet TAKE 1 TABLET (100 MG TOTAL) BY MOUTH DAILY.     lysine (L-LYSINE) 500 mg cap Take 2 capsules by mouth daily.      metoprolol succinate (TOPROL-XL) 50 MG 24 hr tablet TAKE 1 TABLET (50 MG TOTAL) BY MOUTH DAILY.     MULTIVITAMIN ORAL Take 1 tablet by mouth daily.     PARoxetine (PAXIL) 10 MG tablet One tab in the morning and 2 tabs in the evening       Labs:    Ref Range & Units 6/13/19 1114 6/12/19 0930 6/10/19 0723    WBC 4.0 - 11.0 thou/uL 13.2High   14.3High   15.4High      Hemoglobin 12.0 - 16.0 g/dL 12.0  11.7Low   11.5Low      Hematocrit 35.0 - 47.0 % 35.2  32.4Low   33.7Low      MCV 80 - 100 fL 94  94  87     MCH 27.0 - 34.0 pg 32.1  33.9  29.6     MCHC 32.0 - 36.0 g/dL 34.1  36.1High   34.1     RDW 11.0 - 14.5 % 14.2  14.5  13.9     Platelets 140 - 440 thou/uL 462High   426  356        Ref Range & Units 6/13/19 1114 6/10/19 0724 6/10/19 0724    Sodium 136 - 145 mmol/L 136  135Low       Potassium 3.5 - 5.0 mmol/L 4.3  3.6  3.6     Chloride 98 - 107 mmol/L 100  103      CO2 22 - 31 mmol/L 26  22      Anion Gap, Calculation 5 - 18 mmol/L 10  10      Glucose 70 - 125 mg/dL 101  100      Calcium 8.5 - 10.5 mg/dL 9.9  9.1      BUN 8 - 28 mg/dL 18  8      Creatinine 0.60 - 1.10 mg/dL 0.90  0.76      GFR MDRD  "Non Af Amer >60 mL/min/1.73m2 >60  >60         Ref Range & Units 6/6/19 1208    Sodium 136 - 145 mmol/L 123Low      Potassium 3.5 - 5.0 mmol/L 3.0Low      Chloride 98 - 107 mmol/L 90Low      CO2 22 - 31 mmol/L 25     Anion Gap, Calculation 5 - 18 mmol/L 8     Glucose 70 - 125 mg/dL 118     BUN 8 - 28 mg/dL 22     Creatinine 0.60 - 1.10 mg/dL 0.99     GFR MDRD Non Af Amer >60 mL/min/1.73m2 54Low      Bilirubin, Total 0.0 - 1.0 mg/dL 0.7     Calcium 8.5 - 10.5 mg/dL 8.4Low      Protein, Total 6.0 - 8.0 g/dL 6.5     Albumin 3.5 - 5.0 g/dL 2.6Low      Alkaline Phosphatase 45 - 120 U/L 105     AST 0 - 40 U/L 70High      ALT 0 - 45 U/L 59High         Assessment / Plan:    ICD-10-CM    1. Hyponatremia E87.1  improved with IV hydration. HCTZ stopped   2. Bacteremia : E coli R78.81  completing a course of ciprofloxacin   3. Cystitis: E coli N30.90               \"   4. Generalized anxiety disorder F41.1  Paxil   5. Obsessive-compulsive disorder, unspecified type F42.9 Luvox   6. Hypertension I10  metoprolol, losartan, Norvasc   7. Hypokalemia E87.6 replaced   8. Irritable bowel syndrome with diarrhea K58.0  Paremyd lactobacillus           Electronically signed by: Shellie Rosario MD  "

## 2021-05-29 NOTE — PROGRESS NOTES
PRIMARY DIAGNOSIS: GASTROENTERITIS     OUTPATIENT/OBSERVATION GOALS TO BE MET BEFORE DISCHARGE  1. Orthostatic performed: No.  N/A.      2. Tolerating PO fluid and/or antibiotics (if applicable): Yes.  Met.  Tolerating regular diet.      3. Nausea/Vomiting/Diarrhea symptoms improved: Yes.  Met.  No N/V.      4. Pain status: Improved-controlled with oral pain medications.     5. Return to near baseline physical activity: Yes. Met.  Up IND.      Waiting for sodium level to correct. Na+ =126 this morning.  Recheck due next at 1000.         [facility] Follow Up Note    Assessment/Plan:    1. Urinary tract infection , E. coli bacteremia and encephalopathy  Patient was hospitalized for several days and required TCU rehabilitation.  Currently she is back at home.  Discussed increase her physical activity gradually in the next couple of days.  I recommended that her  drives her for the next 10 days.  Mentally she is sharp and back to baseline.  Normally she does not have frequent UTIs.  Discussed to let us know right away if she develops symptoms.  She is insisting on repeating urinalysis today and I would let her do it.  She did complete a 10 days of Cipro and second blood culture was negative.  While at TCU her white count was elevated at 13 persistently.  Patient denies any fevers or chills.  No diarrhea.  She believes though that she was giving loperamide while in presyncopal homes.  Discussed if she develops watery diarrhea to let me know right away.  Also discussed taking a probiotic.  - HM1(CBC and Differential)  - Urinalysis-UC if Indicated  - HM1 (CBC with Diff)    2. Hypertension  She is off hydrochlorothiazide due to hypokalemia and hyponatremia.  She is on losartan and metoprolol.  Amlodipine 5 mg was ordered due to high blood pressure readings at home.  Today her pressure level is very good and she will continue current medication doses.  Discussed if at home but it is less than 110 to let me know and we potentially could decrease her amlodipine dose.  - Basic Metabolic Panel    3. Angular cheilitis and intertrigo  Discussed use topical Lotrimin.  - butenafine (LOTRIMIN ULTRA) 1 % cream; Apply 2ce a day for 10 days to corners of the mouth  Dispense: 30 g; Refill: 0      Brenda Godoy MD    Chief Complaint:  Chief Complaint   Patient presents with     Hospital Visit Follow Up     [facility] 6/6-6/10 - UTI - low K+ and Low Na       History of Present Illness:  Ellen is a 78 y.o. female with mild memory problems,  anxiety, depression and OCD, high blood pressure, hyperlipidemia, aortic stenosis, history of collagenous colitis and IBS, osteopenia.  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  She is currently here for follow-up from recent hospitalization.    Patient developed urinary frequency and incontinence and after 3 days became encephalopathic and confused.  She was hospitalized from June 6 until Mariah 10 for UTI, E. coli bacteremia and encephalopathy.  Subsequently she was in TCU at [facility] from July 10 through July 19.  Because she did have low sodium and potassium levels in the hospital her hydrochlorothiazide medication was stopped.  She completed 10 days of Cipro and second blood culture was negative.  Renal ultrasound was done which did not show any abscess or hydronephrosis but did show right perinephric fluid.  Currently patient denies any urinary frequency urgency or incontinence.  No back pain.  She is fixated on the need to repeat urinalysis today to check for resolution of UTI.  Discussed that normally we do not do that unless she has symptoms but because she is so fixated we will go ahead and order it.    She does have history of hypertension.  Currently she is on losartan and metoprolol.  Because after hydrochlorothiazide cessation her blood pressure at home was 170-180 I started her on amlodipine 5 mg a day.  Blood pressure today has been normal.  She denies any dizziness or lightheadedness.    White count was slightly elevated at [facility] at home at 13.  She denies any diarrhea however she mentions that she might have been on loperamide while there.  Currently discussed that if she develops watery diarrhea to let me know right away.  Discussed that she will need to take probiotics for a few weeks.  She denies any fevers chills or abdominal pain.    Review of Systems:  A comprehensive review of systems was performed and was otherwise negative    PFSH:  Social History: Reviewed  Social  History     Tobacco Use   Smoking Status Never Smoker   Smokeless Tobacco Never Used     Social History     Social History Narrative    She is .  They do not have children.  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     Medications reviewed and reconciled  Past History: Reviewed  Current Outpatient Medications   Medication Sig Dispense Refill     acetaminophen (TYLENOL) 325 MG tablet Take 2 tablets (650 mg total) by mouth every 6 (six) hours as needed.  0     amLODIPine (NORVASC) 5 MG tablet Take 1 tablet (5 mg total) by mouth daily. 30 tablet 4     amoxicillin (AMOXIL) 500 MG capsule Take 2,000 mg by mouth see administration instructions. Take one hour prior to dental appointments as directed.       ascorbic acid (ASCORBIC ACID WITH MINA HIPS) 500 MG tablet Take 500 mg by mouth daily.       b complex vitamins (B-COMPLEX) tablet Take 1 tablet by mouth daily.       CALCIUM-VITAMIN D3 ORAL Take 1 tablet by mouth daily.       cholecalciferol, vitamin D3, (VITAMIN D3) 2,000 unit Tab Take 2,000 Units by mouth daily.              ciprofloxacin HCl (CIPRO) 500 MG tablet Take 1 tablet (500 mg total) by mouth 2 times a day at 6:00 am and 4:00 pm for 10 days. 20 tablet 0     docosahexanoic acid/epa (FISH OIL ORAL) Take 1 capsule by mouth daily.       fluvoxaMINE (LUVOX) 25 MG tablet 2 tabs in the morning and 3 tabs in the evening  0     LACTOBACILLUS ACIDOPHILUS (PROBIOTIC ORAL) Take 1 capsule by mouth daily.       loperamide (IMODIUM A-D) 2 mg tablet Take 2 mg by mouth daily.       losartan (COZAAR) 100 MG tablet TAKE 1 TABLET (100 MG TOTAL) BY MOUTH DAILY. 90 tablet 2     lysine (L-LYSINE) 500 mg cap Take 2 capsules by mouth daily.        metoprolol succinate (TOPROL-XL) 50 MG 24 hr tablet TAKE 1 TABLET (50 MG TOTAL) BY MOUTH DAILY. 90 tablet 3     MULTIVITAMIN ORAL Take 1 tablet by mouth daily.       PARoxetine (PAXIL) 10 MG tablet One tab in the morning and 2  "tabs in the evening  0     butenafine (LOTRIMIN ULTRA) 1 % cream Apply 2ce a day for 10 days to corners of the mouth 30 g 0     No current facility-administered medications for this visit.        Family History: Reviewed    Physical Exam:    Vitals:    06/20/19 1441   BP: 110/60   Patient Site: Left Arm   Patient Position: Sitting   Cuff Size: Adult Regular   Pulse: 68   Resp: 16   SpO2: 98%   Weight: 159 lb 7 oz (72.3 kg)   Height: 5' 8\" (1.727 m)     Wt Readings from Last 3 Encounters:   06/20/19 159 lb 7 oz (72.3 kg)   06/17/19 159 lb 12.8 oz (72.5 kg)   06/14/19 159 lb 12.8 oz (72.5 kg)     Body mass index is 24.24 kg/m .    Constitutional:  Reveals a pleasant female.  Vitals:  Per nursing notes.  HEENT:No cervical LAD, no thyromegaly,  conjunctiva is pink, no scleral icterus, TMs are visualized and normal bl, oropharynx is clear, no exudates,   Cardiac:  Regular rate and rhythm,no murmurs, rubs, or gallops.  Legs with trace edema. Palpation of the radial pulse regular.  Lungs: Clear to auscultation bl.  Respiratory effort normal.  Abdomen:positive BS, soft, nontender, nondistended.  No hepato-splenomagaly  Skin:   Without rash, bruise, or palpable lesions.  She does have slight perioral rash and stride intertrigo in her groin.  Rheumatologic: Normal joints and nails of the hands.  Neurologic:  Cranial nerves II-XII intact.     Psychiatric: affect appropriate, memory intact.     Data Review:    Analysis and Summary of Old Records (2): yes      Records Requested (1): no      Other History Summarized (from other people in the room) (2): no    Radiology Tests Summarized (XRAY/CT/MRI/DXA) (1): renal US    Labs Reviewed (1): yes    Medicine Tests Reviewed (EKG/ECHO/COLONOSCOPY/EGD) (1): no    Independent Review of EKG or X-RAY (2): no      "

## 2021-05-29 NOTE — TELEPHONE ENCOUNTER
[facility] Nurse Triage Telephone Note      Provider: MORENO Zaldivar  Facility: [facility]    Facility Type: TCU    Caller: Nidhi  Call Back Number:  [phone]    Allergies: Sertraline    Reason for call: BMP WNL, CBC improving. No concerns at this time.     Verbal Order/Direction given by Provider: REGULO    Provider giving order: MORENO Zaldivar    Verbal order given to: Nidhi Moran RN

## 2021-05-29 NOTE — PROGRESS NOTES
[facility] Clinic Note    Patient Name: Ellen Felipe  Patient Age: 78 y.o.  YOB: 1940  MRN: [id number]    Date of visit: 6/6/2019    Assessment/Plan:  Recent Results (from the past 24 hour(s))   Urinalysis-UC if Indicated   Result Value Ref Range    Color, UA Yellow Colorless, Yellow, Straw, Light Yellow    Clarity, UA Cloudy (!) Clear    Glucose, UA Negative Negative    Bilirubin, UA Negative Negative    Ketones, UA Negative Negative    Specific Gravity, UA 1.010 1.005 - 1.030    Blood, UA Moderate (!) Negative    pH, UA 6.0 5.0 - 8.0    Protein,  mg/dL (!) Negative mg/dL    Urobilinogen, UA 1.0 E.U./dL 0.2 E.U./dL, 1.0 E.U./dL    Nitrite, UA Positive (!) Negative    Leukocytes, UA Moderate (!) Negative    Bacteria, UA Moderate (!) None Seen hpf    RBC, UA None Seen None Seen, 0-2 hpf    WBC, UA  (!) None Seen, 0-5 hpf    Squam Epithel, UA None Seen None Seen, 0-5 lpf     No medications were ordered this encounter      ICD-10-CM    1. Urine frequency R35.0 Urinalysis-UC if Indicated     Urinalysis-UC if Indicated     Culture, Urine   2. Encephalopathy G93.40        Urinalysis is abnormal and so this very well may be from urinary tract infection but would recommend ER evaluation since she is encephalopathic, friend and  will take her and they are agreeable to this.  I did call report to [facility] and visited with the ER physician there.  Wide differential diagnosis.      Patient Instructions   Go to ER now.      Counseled patient regarding treatments, treatment options, risks and benefits and diagnosis.  The patient was interactive, attentive, verbalized understanding, and we discussed plan.       Patient Active Problem List   Diagnosis     Benign Polyps Of The Large Intestine     Allergic Rhinitis     Diverticulosis     Osteopenia     Herpes Simplex     Hypercholesterolemia     Irritable Bowel Syndrome     Major Depression, Single Episode In Remission     Hypertension     OCD  (obsessive compulsive disorder)     Aortic stenosis, mild     Collagenous colitis     Generalized anxiety disorder     Social History     Social History Narrative    She is .  They do not have children.  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     Family History   Problem Relation Age of Onset     Heart failure Mother          age 96     Heart failure Father          age 85     Diabetes type II Sister      Diabetes type II Brother      Diabetes type II Brother      Outpatient Encounter Medications as of 2019   Medication Sig Dispense Refill     acetaminophen 325 mg cap Take by mouth.       amLODIPine (NORVASC) 5 MG tablet Take 1 tablet (5 mg total) by mouth daily. 30 tablet 4     ascorbic acid (ASCORBIC ACID WITH MINA HIPS) 500 MG tablet Take 500 mg by mouth daily.       b complex vitamins (B-COMPLEX) tablet Take 1 tablet by mouth daily.       calcium carbonate-vitamin D3 600 mg calcium- 200 unit cap Take 1 tablet by mouth daily.       cholecalciferol, vitamin D3, (VITAMIN D3) 2,000 unit Tab Take 1 tablet by mouth daily.       erythromycin with ethanol (EMGEL) 2 % gel Apply topically daily.       FLUOCINOLONE ACETONIDE (FLUOCINOLONE TOP) Apply topically.       fluocinonide (LIDEX) 0.05 % cream        fluvoxaMINE (LUVOX) 25 MG tablet 2 tabs in the morning and 3 tabs in the evening  0     hydroCHLOROthiazide (MICROZIDE) 12.5 mg capsule Take 1 capsule (12.5 mg total) by mouth daily. 30 capsule 11     LACTOBACILLUS ACIDOPHILUS (PROBIOTIC ORAL) Take 1 capsule by mouth daily.       loperamide (IMODIUM) 2 mg capsule Take 2 mg by mouth daily.       losartan (COZAAR) 100 MG tablet TAKE 1 TABLET (100 MG TOTAL) BY MOUTH DAILY. 90 tablet 2     lysine (L-LYSINE) 500 mg cap Take 2 capsules by mouth daily.        metoprolol succinate (TOPROL-XL) 50 MG 24 hr tablet TAKE 1 TABLET (50 MG TOTAL) BY MOUTH DAILY. 90 tablet 3     metroNIDAZOLE (NORITRATE) 1 %  "cream Apply twice a day 60 g 2     MULTIVITAMIN ORAL Take 1 tablet by mouth daily.       omega-3 fatty acids 1,000 mg cap Take 1 capsule by mouth daily.       PARoxetine (PAXIL) 10 MG tablet One tab in the morning and 2 tabs in the evening  0     No facility-administered encounter medications on file as of 6/6/2019.        Chief Complaint:   Chief Complaint   Patient presents with     Urinary Tract Infection     incontinence     Fatigue     lethargic     Aphasia     Dehydration       /76 (Patient Site: Left Arm, Patient Position: Sitting, Cuff Size: Adult Regular)   Pulse 82   Ht 5' 7\" (1.702 m)   Wt 159 lb (72.1 kg)   SpO2 94%   BMI 24.90 kg/m    HPI:   Meghan has been slower to think, move, speak over the past 3 days.  Sleeping upwards of 12 hours a day.  Sometimes more difficulty with walking, other times is fine.      Has been incontinent over the past 4 days.  No pain with urination or other urinary symptom.  No decrease in urination.  Drinks about 2 liters of water a day.      No known focal weakness, no chest pain or shortness of breath or abdominal pain.  No new deficit with seeing or hearing that she is aware of.  ROS: Pertinent ros findings in hpi, all other systems negative.    Objective/Physical Exam:     /76 (Patient Site: Left Arm, Patient Position: Sitting, Cuff Size: Adult Regular)   Pulse 82   Ht 5' 7\" (1.702 m)   Wt 159 lb (72.1 kg)   SpO2 94%   BMI 24.90 kg/m        Skin: warm, dry, no rash, pallor cyanosis  HENT: normocephalic atraumatic, MMM, no oral lesions, otorrhea, rhinorrhea. TM's normal bilaterally.  Eyes: non-icteric, extra-ocular movements intact, PERRL, conjunctivae not injected. Holding eyes open comfortably, no drainage.  CV: NRRR no m/r/g, no peripheral edema. no JVD.  Resp: CTAB no w/r/r, normal respiratory effort  GI: soft, non-tender, non-distended. No masses.  MSK: no muscle or joint swelling.  Lymph: No significant cervical lymphadenopathy  Hematologic: No " petechiae or purpura.    Extraocular movements intact, eyes track equally in all directions. Pupils same size bilaterally and equally reactive to light: y  Finger rub hearing is present and the same bilaterally: y    Smile symmetric bilaterally: y  Fine touch sensation is intact and same bilaterally on face: y    Bilateral upper and lower extremity strength:  Shoulder abduction and adduction: 5/5  Elbow flexion/extension: 5/5  Wrist flexion/extension: 5/5   5/5    Knee extension: 5/5      Tone: normal      Dysdiadochokinesis: n    Pronator drift: absent    Cognition: Alert and oriented x 3    She is able to follow directions but is slow to do so, she had some difficulty remembering my questions after I would ask them and was quite slow to respond and seemed to have some difficulty responding fully.  I did have her stand up but this is quite slow and she was fairly unsteady with just standing.  Brooks Chicas MD

## 2021-05-29 NOTE — PATIENT INSTRUCTIONS - HE
1. For lip rash and vaginal itching try antifungal cream: Lotrimin (Clotrimazole).    2. If Meals on wheels something you would like to try, let me know and we will refer you to Care Coordination program (they help with all non medical things, including food access, finances, transportation, etc).    3. If at home b/p is <110, let me know and we will decrease you amlodipine medication.

## 2021-05-29 NOTE — PROGRESS NOTES
"[facility]    Name:   Ellen Felipe  : 1940  Facility:   [facility] [[id number]]   Room:   Code Status: FULL CODE -   Fac type:   SNF (Skilled Nursing Facility, TCU) -     CHIEF COMPLAINT / REASON FOR VISIT:  Chief Complaint   Patient presents with     Follow-up     TCU follow-up after hospitalization for weakness, UTI with gram-negative bacteremia, hyponatremia, and hypokalemia.      [facility] from 2019 until 06/10/2019 (UTI with gram-negative bacteremia and encephalopathy)      HPI: Ellen is a 78 y.o. female mild memory problems (patient-endorsed), generalized anxiety disorder, depression and OCD (on both paroxetine and fluvoxamine), hypertension, hyperlipidemia, aortic stenosis, history of collagenous colitis and IBS, osteopenia.      She was admitted to the hospital on 2019 for weakness and fever and was found to have hyponatremia, hypokalemia, and a urinary tract infection, and was started on antibiotics while her hydrochlorothiazide was stopped.  The urine culture grew E. coli, and a blood culture was also positive for the same.  Antibiotics were switched to ciprofloxacin with a 10-day course, and at the time of discharge, she had stabilized, and a follow-up blood culture was not providing any growth.  There was a mildly elevated WBC count; however, she was afebrile, her blood pressure was stable, she had no tachycardia, and she was feeling comfortable.  Hydrochlorothiazide was discontinued.  Recommendation called for labs to be checked here.      TCU ISSUES    Primary diagnosis: We will obtain a follow-up BMP and CBC with differential.    Depression/obsessive-compulsive disorder: It is immediately obvious that the patient suffers from anxiety.  She states, \"I struggle some with depression and anxiety.\"  Of particular note is that she is receiving 2 SSRIs, fluvoxamine for OCD (she calls it \"ruminations\") and paroxetine for " "depression and anxiety.  She is seen by both psychiatry and psychology.  She is also on a variety of herbals/vitamins.    Hypertension: On losartan (100 mg daily) and metoprolol succinate (50 mg daily).    ROS: She tells me she is feeling \"better  not better better,\" and \"more sure of my feet.\"  No headaches or chest pains, coughing or congestion, nausea or vomiting, dizziness or dyspnea, dysuria, constipation or diarrhea, difficulty chewing or swallowing, or any integumentary issues.    Past Medical History:   Diagnosis Date     Depression      Hyperlipidemia      Hypertension     Created by Conversion  Replacement Utility updated for latest IMO load     IBS (irritable bowel syndrome)      OCD (obsessive compulsive disorder) 4/10/2015     OCD (obsessive compulsive disorder)               Family History   Problem Relation Age of Onset     Heart failure Mother          age 96     Heart failure Father          age 85     Diabetes type II Sister      Diabetes type II Brother      Diabetes type II Brother      Social History     Socioeconomic History     Marital status:      Spouse name: Not on file     Number of children: Not on file     Years of education: Not on file     Highest education level: Not on file   Occupational History     Not on file   Social Needs     Financial resource strain: Not on file     Food insecurity:     Worry: Not on file     Inability: Not on file     Transportation needs:     Medical: Not on file     Non-medical: Not on file   Tobacco Use     Smoking status: Never Smoker     Smokeless tobacco: Never Used   Substance and Sexual Activity     Alcohol use: Yes     Alcohol/week: 0.5 oz     Types: 1 Standard drinks or equivalent per week     Comment: 1 glass of red wine     Drug use: No     Sexual activity: Not on file   Lifestyle     Physical activity:     Days per week: Not on file     Minutes per session: Not on file     Stress: Not on file   Relationships     Social connections: "     Talks on phone: Not on file     Gets together: Not on file     Attends Anabaptist service: Not on file     Active member of club or organization: Not on file     Attends meetings of clubs or organizations: Not on file     Relationship status: Not on file     Intimate partner violence:     Fear of current or ex partner: Not on file     Emotionally abused: Not on file     Physically abused: Not on file     Forced sexual activity: Not on file   Other Topics Concern     Not on file   Social History Narrative    She is .  They do not have children.  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       MEDICATIONS: Reviewed from the MAR, physician orders, and/or earlier progress notes.  Current Outpatient Medications   Medication Sig     acetaminophen (TYLENOL) 325 MG tablet Take 2 tablets (650 mg total) by mouth every 6 (six) hours as needed.     amLODIPine (NORVASC) 5 MG tablet Take 1 tablet (5 mg total) by mouth daily.     amoxicillin (AMOXIL) 500 MG capsule Take 2,000 mg by mouth see administration instructions. Take one hour prior to dental appointments as directed.     ascorbic acid (ASCORBIC ACID WITH MINA HIPS) 500 MG tablet Take 500 mg by mouth daily.     b complex vitamins (B-COMPLEX) tablet Take 1 tablet by mouth daily.     CALCIUM-VITAMIN D3 ORAL Take 1 tablet by mouth daily.     cholecalciferol, vitamin D3, (VITAMIN D3) 2,000 unit Tab Take 2,000 Units by mouth daily.            ciprofloxacin HCl (CIPRO) 500 MG tablet Take 1 tablet (500 mg total) by mouth 2 times a day at 6:00 am and 4:00 pm for 10 days.     docosahexanoic acid/epa (FISH OIL ORAL) Take 1 capsule by mouth daily.     fluvoxaMINE (LUVOX) 25 MG tablet 2 tabs in the morning and 3 tabs in the evening     LACTOBACILLUS ACIDOPHILUS (PROBIOTIC ORAL) Take 1 capsule by mouth daily.     loperamide (IMODIUM A-D) 2 mg tablet Take 2 mg by mouth daily.     losartan (COZAAR) 100 MG tablet TAKE 1  "TABLET (100 MG TOTAL) BY MOUTH DAILY.     lysine (L-LYSINE) 500 mg cap Take 2 capsules by mouth daily.      metoprolol succinate (TOPROL-XL) 50 MG 24 hr tablet TAKE 1 TABLET (50 MG TOTAL) BY MOUTH DAILY.     MULTIVITAMIN ORAL Take 1 tablet by mouth daily.     PARoxetine (PAXIL) 10 MG tablet One tab in the morning and 2 tabs in the evening     ALLERGIES:   Allergies   Allergen Reactions     Sertraline Diarrhea     DIET: Regular, regular texture, thin liquids.    Vitals:    06/12/19 1222   BP: 129/68   Pulse: 79   Resp: 18   Temp: 98.1  F (36.7  C)   SpO2: 98%   Weight: 162 lb 12.8 oz (73.8 kg)   Height: 5' 8\" (1.727 m)     Body mass index is 24.75 kg/m .    EXAMINATION:   General: Pleasant, alert, and conversant -- though mildly anxious -- elderly female, sitting in a recliner, having breakfast.  Head: Normocephalic and atraumatic.   Eyes: PERRLA, sclerae clear.   ENT: Moist oral mucosa.  She has her own teeth.  No rhinorrhea or nasal discharge.  Hearing is unimpaired.  Cardiovascular: Regular rate and rhythm with a 2/6 BUFFY at the LSB.   Respiratory: Lungs clear to auscultation bilaterally.   Abdomen: Soft and nontender.   Musculoskeletal/Extremities: Age-related degenerative joint disease.  No peripheral edema.  Integument: No rashes, clinically significant lesions, or skin breakdown.   Cognitive/Psychiatric: Alert and oriented with an apparent anxiety component.    DIAGNOSTICS:   Results for orders placed or performed during the hospital encounter of 06/06/19   Basic Metabolic Panel   Result Value Ref Range    Sodium 135 (L) 136 - 145 mmol/L    Potassium 3.6 3.5 - 5.0 mmol/L    Chloride 103 98 - 107 mmol/L    CO2 22 22 - 31 mmol/L    Anion Gap, Calculation 10 5 - 18 mmol/L    Glucose 100 70 - 125 mg/dL    Calcium 9.1 8.5 - 10.5 mg/dL    BUN 8 8 - 28 mg/dL    Creatinine 0.76 0.60 - 1.10 mg/dL    GFR MDRD Af Amer >60 >60 mL/min/1.73m2    GFR MDRD Non Af Amer >60 >60 mL/min/1.73m2     Lab Results   Component Value " Date    WBC 15.4 (H) 06/10/2019    HGB 11.5 (L) 06/10/2019    HCT 33.7 (L) 06/10/2019    MCV 87 06/10/2019     06/10/2019     Estimated Creatinine Clearance: 68.6 mL/min (by C-G formula based on SCr of 0.76 mg/dL).  Lab Results   Component Value Date    TSH 2.06 05/08/2019     ASSESSMENT/Plan:      ICD-10-CM    1. History of UTI gram-negative bacteremia with encephalopathy Z87.898    2. Hyponatremia E87.1    3. Generalized anxiety disorder F41.1    4. Obsessive-compulsive disorder, unspecified type F42.9    5. Major Depression, Single Episode In Remission F32.5    6. Irritable bowel syndrome with diarrhea K58.0      CHANGES:    Follow-up BMP and hemogram-2 due to recent hyponatremia and elevated white count.    CARE PLAN:    The care plan has been reviewed and all orders signed. Changes to care plan, if any, as noted. Otherwise, continue current plan of care.    The above has been created using voice recognition software. Please be aware that this may unintentionally  produce inaccuracies and/or nonsensical sentences.      Electronically signed by: Darron Tang, MARY

## 2021-05-30 ENCOUNTER — RECORDS - HEALTHEAST (OUTPATIENT)
Dept: ADMINISTRATIVE | Facility: CLINIC | Age: 81
End: 2021-05-30

## 2021-05-30 VITALS — HEIGHT: 68 IN | BODY MASS INDEX: 23.11 KG/M2 | WEIGHT: 152.5 LBS

## 2021-05-30 VITALS — HEIGHT: 68 IN | WEIGHT: 151 LBS | BODY MASS INDEX: 22.88 KG/M2

## 2021-05-30 NOTE — TELEPHONE ENCOUNTER
Agree with Benadryl.  She could also start Zyrtec at 10 mg at bedtime.  Zantac-OTC-75 mg twice daily can also help.  Please make sure that she does not have any shortness of breath or angioedema.  She should proceed to the emergency room if any of these develop.

## 2021-05-30 NOTE — TELEPHONE ENCOUNTER
Contacted patient and gave Dr Godoy's message below. Patient states she is requesting a UA to be completed to make sure the infection has cleared, please advise.

## 2021-05-30 NOTE — TELEPHONE ENCOUNTER
Spoke with  Pt. Discussed to only do urinalysis if havinf symptoms. She currently is feeling well and does not have Sx.  Continue hydration, probiotics, cranberry capsules and vaginal estrogen.

## 2021-05-30 NOTE — TELEPHONE ENCOUNTER
Patient was called , and given added information from provider. Patient expressed understanding, regarding   New meds OTC to take.  She reports NO angioadema.  Yuli Lopez RN  [facility] Triage/refill nurse

## 2021-05-30 NOTE — TELEPHONE ENCOUNTER
Today, all over itch.    Just finished Bactrim for UTI.     No frequency, no pain, no urgency , no back pain and no fever.      Now she is reporting , waking up this AM with a terrible itch all over .  Arms  And Legs.      Call patient. [phone]    Yuli Lopez RN  [facility] Triage/refill nurse    Please call patient with any recommendation.    She was advised to use Benadryl.      Reason for Disposition    Hives or itching    Protocols used: RASH - WIDESPREAD ON DRUGS - DRUG [id number]

## 2021-05-30 NOTE — TELEPHONE ENCOUNTER
Attempted to contact pt.  Phone rang over several minutes with no answer or voicemail picking up.  Will attempt to reach pt later.  If pt calls back, please relay PCP message.

## 2021-05-30 NOTE — PROGRESS NOTES
[facility] Follow Up Note    Assessment/Plan:  1. Adverse effect of drug, initial encounter and rash  Most likely secondary to Bactrim.  She finished Bactrim and currently her rash is gone and itching is improving on Zyrtec and Benadryl.  She will continue on those medications until her symptoms are completely gone    2.  Recurrent UTIs  Discussed that we will need to test her urine if she is having symptoms.  I would avoid screening urinalysis when she is asymptomatic to prevent overtreating her.  She will continue hydration, vitamin C supplement and vaginal estrogen to help prevent future urinary tract infections.    3.  Blood pressure.  Continues to be good off hydrochlorothiazide and on losartan and metoprolol.      Brenda Godoy MD    Chief Complaint:  Chief Complaint   Patient presents with     Medication Reaction     Rash       History of Present Illness:  Ellen is a 78 y.o. female  with mild memory problems, anxiety, depression and OCD, high blood pressure, hyperlipidemia, aortic stenosis, history of collagenous colitis and IBS, osteopenia.  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  She is currently here for follow-up and complain of rash and itchiness.    I saw Ellen last month after she was hospitalized with E. coli bacteremia.  She was treated with Cipro at that time.  Subsequent urinalysis showed recurrent urinary tract infection with E. coli which was resistant to Cipro and we treated her with Bactrim.  Patient reports that several days ago she developed itchiness and a rash.  She has been utilizing Zyrtec and Benadryl with improvement of symptoms.  Currently her rash is almost completely gone and itchiness is improving.  Most likely this was an adverse reaction to Bactrim and will put it in her allergy panel.    Currently she denies any recurrent urinary symptoms.  I advised against routine urinalysis screening unless she develops symptoms.  We discussed hydration and  topical vaginal estrogen as well as vitamin C to prevent recurrent urinary tract infections in the future.    She continues off hydrochlorothiazide and her blood pressure is very well controlled.  She is on losartan and metoprolol.    Review of Systems:  A comprehensive review of systems was performed and was otherwise negative    PFSH:  Social History: Reviewed  Social History     Tobacco Use   Smoking Status Never Smoker   Smokeless Tobacco Never Used     Social History     Social History Narrative    She is .  They do not have children.  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  0     amLODIPine (NORVASC) 5 MG tablet Take 1 tablet (5 mg total) by mouth daily. 30 tablet 4     amoxicillin (AMOXIL) 500 MG capsule Take 2,000 mg by mouth see administration instructions. Take one hour prior to dental appointments as directed.       ascorbic acid (ASCORBIC ACID WITH MINA HIPS) 500 MG tablet Take 500 mg by mouth daily.       b complex vitamins (B-COMPLEX) tablet Take 1 tablet by mouth daily.       butenafine (LOTRIMIN ULTRA) 1 % cream Apply 2ce a day for 10 days to corners of the mouth 30 g 0     CALCIUM-VITAMIN D3 ORAL Take 1 tablet by mouth daily.       cholecalciferol, vitamin D3, (VITAMIN D3) 2,000 unit Tab Take 2,000 Units by mouth daily.              conjugated estrogens (PREMARIN) vaginal cream Insert 0.5 g into the vagina 2 (two) times a week. 42.5 g 4     docosahexanoic acid/epa (FISH OIL ORAL) Take 1 capsule by mouth daily.       fluvoxaMINE (LUVOX) 25 MG tablet 2 tabs in the morning and 3 tabs in the evening  0     LACTOBACILLUS ACIDOPHILUS (PROBIOTIC ORAL) Take 1 capsule by mouth daily.       loperamide (IMODIUM A-D) 2 mg tablet Take 2 mg by  "mouth daily.       losartan (COZAAR) 100 MG tablet TAKE 1 TABLET (100 MG TOTAL) BY MOUTH DAILY. 90 tablet 2     lysine (L-LYSINE) 500 mg cap Take 2 capsules by mouth daily.        metoprolol succinate (TOPROL-XL) 50 MG 24 hr tablet TAKE 1 TABLET (50 MG TOTAL) BY MOUTH DAILY. 90 tablet 3     MULTIVITAMIN ORAL Take 1 tablet by mouth daily.       PARoxetine (PAXIL) 10 MG tablet One tab in the morning and 2 tabs in the evening  0     No current facility-administered medications for this visit.        Family History: Viewed    Physical Exam:    Vitals:    07/08/19 1121   BP: 104/64   Patient Site: Left Arm   Patient Position: Sitting   Cuff Size: Adult Regular   Pulse: 72   SpO2: 97%   Weight: 161 lb (73 kg)   Height: 5' 8\" (1.727 m)     Wt Readings from Last 3 Encounters:   07/08/19 161 lb (73 kg)   06/20/19 159 lb 7 oz (72.3 kg)   06/17/19 159 lb 12.8 oz (72.5 kg)     Body mass index is 24.48 kg/m .    Constitutional:  Reveals a pleasant female.  Vitals:  Per nursing notes.  HEENT:No cervical LAD, no thyromegaly,  conjunctiva is pink, no scleral icterus, TMs are visualized and normal bl, oropharynx is clear, no exudates,   Cardiac:  Regular rate and rhythm,no murmurs, rubs, or gallops.Legs without edema. Palpation of the radial pulse regular.  Lungs: Clear to auscultation bl.  Respiratory effort normal.  Abdomen:positive BS, soft, nontender, nondistended.  No hepato-splenomagaly  Skin:   Without rash, bruise, or palpable lesions.  Rash is currently resolved.  She does have multiple seborrheic keratosis.  Rheumatologic: Normal joints and nails of the hands.  Neurologic:  Cranial nerves II-XII intact.     Psychiatric: affect appropriate, memory intact.  Moderate anxiety noted.    Data Review:    Analysis and Summary of Old Records (2): yes      Records Requested (1): no      Other History Summarized (from other people in the room) (2): no    Radiology Tests Summarized (XRAY/CT/MRI/DXA) (1): no    Labs Reviewed (1): " yes    Medicine Tests Reviewed (EKG/ECHO/COLONOSCOPY/EGD) (1): no    Independent Review of EKG or X-RAY (2): no

## 2021-05-30 NOTE — TELEPHONE ENCOUNTER
Spoke with pt and relayed PCP message.  Pt understanding. Pt wants to know if she needs to come back and have another UA/UC after she completes the abx?

## 2021-05-30 NOTE — TELEPHONE ENCOUNTER
Please let pt know,  Urine culture shows low grade bacterial growth of  E Coli . It is resistant to Cipro and the strain is different from most recent UTI she had.     I'm ordering repeat antibiotic (Bactrim) for her to take for 7 days.  Also, with recurrent UTIs, treating vaginal atrophy with topical vaginal estrogen cream can help prevent recurrences. 'm sending script for this as well:

## 2021-05-30 NOTE — TELEPHONE ENCOUNTER
No repeat Urinalysis needed unless she develops symptoms (increased frequency, urgency, burning, incontinence).    Dr MARQUIS

## 2021-05-31 ENCOUNTER — RECORDS - HEALTHEAST (OUTPATIENT)
Dept: ADMINISTRATIVE | Facility: CLINIC | Age: 81
End: 2021-05-31

## 2021-05-31 VITALS — WEIGHT: 153.44 LBS | HEIGHT: 67 IN | BODY MASS INDEX: 24.08 KG/M2

## 2021-05-31 VITALS — HEIGHT: 68 IN | WEIGHT: 150 LBS | BODY MASS INDEX: 22.73 KG/M2

## 2021-05-31 VITALS — WEIGHT: 155.4 LBS | BODY MASS INDEX: 23.98 KG/M2

## 2021-05-31 NOTE — PATIENT INSTRUCTIONS - HE
1. Swelling: can be due Amlodipine and spending too much time on your feet. Since b/p is on the low side today, lets stop Amlodipine and use compression stalkings. If b/p goes up, we can add small dose of diuretic: HCTZ.    Let me know if b/p at home is 150 or higher.

## 2021-05-31 NOTE — PROGRESS NOTES
[facility] Follow Up Note    Assessment/Plan:    1. Essential hypertension  Patient has been on losartan and metoprolol.  In the past she was on small dose of hydrochlorothiazide but that was stopped due to low potassium and sodium level when she was hospitalized with a UTI in June.  She was started on amlodipine 5 mg at that time due to high blood pressures.  Today due to increasing lower extremity edema and low blood pressure we discussed to stop amlodipine.  She will start with compression stockings and leg elevation.  She will let me know if systolic blood pressure is 150 or above and we consider restarting small dose of hydrochlorothiazide or alternatively giving her a small dose of amlodipine.  Kidney function in June was normal she does have history of diastolic dysfunction types to moderate aortic regurgitation but no symptoms of congestive heart failure.    2. Edema, unspecified type  - Compression stockings 20/30 mmHg; Knee    3. Venous (peripheral) insufficiency  - Compression stockings 20/30 mmHg; Knee      Brenda Godoy MD    Chief Complaint:  Chief Complaint   Patient presents with     Edema     bilateral ankles       History of Present Illness:  Ellen is a 79 y.o. female with mild memory problems, anxiety, depression and OCD, high blood pressure, hyperlipidemia, aortic stenosis, history of collagenous colitis and IBS, osteopenia.  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  She is currently here for acute visit due to worsening lower extremity edema.    Swelling in the leg started 5 days ago.  She has been on her feet a lot 3 days ago due to hosting a party.  She has been eating some barbecue chicken and pork.  She denies any NSAID use.  Currently her blood pressure medications include Norvasc, losartan and metoprolol.  In the office today blood pressure is low at 96/60.  Looking back the trend has been that blood pressure has been going down since June.  She reports however  that at home when she does check her blood pressure is usually in 130s.    Her amlodipine was started recently after she was hospitalized for urinary tract infection in June and was found to have low sodium and potassium levels.  At that time her hydrochlorothiazide was stopped (she was on 12.5 mg a day).    Her last echocardiogram done in February 2018, it showed normal ejection fraction, mild LVH, diastolic dysfunction type II and moderate aortic regurgitation.  Patient denies any shortness of breath.    Review of Systems:  A comprehensive review of systems was performed and was otherwise negative    PFSH:  Social History: Reviewed  Social History     Tobacco Use   Smoking Status Never Smoker   Smokeless Tobacco Never Used     Social History     Social History Narrative    She is .  They do not have children.  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  0     amoxicillin (AMOXIL) 500 MG capsule Take 2,000 mg by mouth see administration instructions. Take one hour prior to dental appointments as directed.       ascorbic acid (ASCORBIC ACID WITH MINA HIPS) 500 MG tablet Take 500 mg by mouth daily.       b complex vitamins (B-COMPLEX) tablet Take 1 tablet by mouth daily.       butenafine (LOTRIMIN ULTRA) 1 % cream Apply 2ce a day for 10 days to corners of the mouth 30 g 0     CALCIUM-VITAMIN D3 ORAL Take 1 tablet by mouth daily.       cholecalciferol, vitamin D3, (VITAMIN D3) 2,000 unit Tab Take 2,000 Units by mouth daily.              conjugated estrogens (PREMARIN) vaginal cream Insert 0.5 g into the vagina 2 (two) times a week. 42.5 g 4     docosahexanoic acid/epa (FISH OIL ORAL) Take 1 capsule by mouth daily.       fluvoxaMINE (LUVOX) 25 MG tablet 2  "tabs in the morning and 3 tabs in the evening  0     LACTOBACILLUS ACIDOPHILUS (PROBIOTIC ORAL) Take 1 capsule by mouth daily.       loperamide (IMODIUM A-D) 2 mg tablet Take 2 mg by mouth daily.       losartan (COZAAR) 100 MG tablet TAKE 1 TABLET (100 MG TOTAL) BY MOUTH DAILY. 90 tablet 3     lysine (L-LYSINE) 500 mg cap Take 2 capsules by mouth daily.        metoprolol succinate (TOPROL-XL) 50 MG 24 hr tablet TAKE 1 TABLET (50 MG TOTAL) BY MOUTH DAILY. 90 tablet 3     MULTIVITAMIN ORAL Take 1 tablet by mouth daily.       PARoxetine (PAXIL) 10 MG tablet One tab in the morning and 2 tabs in the evening  0     No current facility-administered medications for this visit.        Family History: Viewed    Physical Exam:    Vitals:    08/26/19 1428   BP: 96/60   Patient Site: Left Arm   Patient Position: Sitting   Cuff Size: Adult Regular   Pulse: 60   SpO2: 96%   Weight: 163 lb 7 oz (74.1 kg)   Height: 5' 8\" (1.727 m)     Wt Readings from Last 3 Encounters:   08/26/19 163 lb 7 oz (74.1 kg)   07/08/19 161 lb (73 kg)   06/20/19 159 lb 7 oz (72.3 kg)     Body mass index is 24.85 kg/m .    Constitutional:  Reveals a pleasant female.  Vitals:  Per nursing notes.  HEENT:No cervical LAD, no thyromegaly,  conjunctiva is pink, no scleral icterus, TMs are visualized and normal bl, oropharynx is clear, no exudates,   Cardiac:  Regular rate and rhythm, she has moderate systolic murmur right and left sternal border. Legs with 2+ dependent edema. Palpation of the radial pulse regular.  Lungs: Clear to auscultation bl.  Respiratory effort normal.  No wheezes or rales  Abdomen:positive BS, soft, nontender, nondistended.  No hepato-splenomagaly  Skin:   Without rash, bruise, or palpable lesions.  No lower extremity cellulitis.  Rheumatologic: Normal joints and nails of the hands.  Neurologic:  Cranial nerves II-XII intact.     Psychiatric: affect appropriate, memory intact.     Data Review:    Analysis and Summary of Old Records (2): " yes      Records Requested (1): no      Other History Summarized (from other people in the room) (2): no    Radiology Tests Summarized (XRAY/CT/MRI/DXA) (1): no    Labs Reviewed (1): yes    Medicine Tests Reviewed (EKG/ECHO/COLONOSCOPY/EGD) (1): echo    Independent Review of EKG or X-RAY (2): no

## 2021-05-31 NOTE — TELEPHONE ENCOUNTER
Refill Approved    Rx renewed per Medication Renewal Policy. Medication was last renewed on 11/1/2018 .    Fransisco Wise, [facility] Triage/Med Refill 8/16/2019     Requested Prescriptions   Pending Prescriptions Disp Refills     losartan (COZAAR) 100 MG tablet [Pharmacy Med Name: LOSARTAN POTASSIUM 100MG** 100 TAB] 90 tablet 2     Sig: TAKE 1 TABLET (100 MG TOTAL) BY MOUTH DAILY.       Angiotensin Receptor Blocker Protocol Passed - 8/16/2019 12:06 PM        Passed - PCP or prescribing provider visit in past 12 months       Last office visit with prescriber/PCP: 9/18/2018 Lupe Quintero MD OR same dept: 7/8/2019 Brenda Godoy MD OR same specialty: 7/8/2019 Brenda Godoy MD  Last physical: 2/7/2018 Last MTM visit: Visit date not found   Next visit within 3 mo: Visit date not found  Next physical within 3 mo: Visit date not found  Prescriber OR PCP: Lupe Quintero MD  Last diagnosis associated with med order: 1. Essential hypertension  - losartan (COZAAR) 100 MG tablet [Pharmacy Med Name: LOSARTAN POTASSIUM 100MG** 100 TAB]; TAKE 1 TABLET (100 MG TOTAL) BY MOUTH DAILY.  Dispense: 90 tablet; Refill: 2    If protocol passes may refill for 12 months if within 3 months of last provider visit (or a total of 15 months).             Passed - Serum potassium within the past 12 months     Lab Results   Component Value Date    Potassium 4.4 06/20/2019             Passed - Blood pressure filed in past 12 months     BP Readings from Last 1 Encounters:   07/08/19 104/64             Passed - Serum creatinine within the past 12 months     Creatinine   Date Value Ref Range Status   06/20/2019 0.94 0.60 - 1.10 mg/dL Final

## 2021-06-01 ENCOUNTER — RECORDS - HEALTHEAST (OUTPATIENT)
Dept: ADMINISTRATIVE | Facility: CLINIC | Age: 81
End: 2021-06-01

## 2021-06-01 VITALS — WEIGHT: 153.44 LBS | HEIGHT: 68 IN | BODY MASS INDEX: 23.26 KG/M2

## 2021-06-01 NOTE — PROGRESS NOTES
[facility] Follow Up Note    Assessment/Plan:    1. Essential hypertension  I would like blood pressure to be better control given her history of LVH and diastolic dysfunction.  Hydrochlorothiazide caused electrolyte abnormalities and small dose of amlodipine caused moderate edema.  She will continue Toprol-XL.  We will switch her losartan to Spironolactone.  She will continue monitoring blood pressures at home and let me know how they are.  We will repeat potassium levels on follow-up in a month.  - spironolactone (ALDACTONE) 50 MG tablet; Take 1 tablet (50 mg total) by mouth daily.  Dispense: 30 tablet; Refill: 2    2. Edema, unspecified type  This was secondary to amlodipine and currently improved since we stopped it and she has been using compression stockings.  Previous renal function was normal.  - spironolactone (ALDACTONE) 50 MG tablet; Take 1 tablet (50 mg total) by mouth daily.  Dispense: 30 tablet; Refill: 2    3.  Anxiety.  She is on Paxil and sees psychologist.  She does do well with negative.  Anxiety can also be contributing to her hypertension and I asked her to monitor her blood pressures at home.    Brenda Godoy MD    Chief Complaint:  Chief Complaint   Patient presents with     Follow-up     Ankle swelling       History of Present Illness:  Ellen is a 79 y.o. female  with mild memory problems, anxiety, depression and OCD, high blood pressure, hyperlipidemia, aortic stenosis, history of collagenous colitis and IBS, osteopenia.  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  She is currently here for follow-up on her hypertension.    Patient is currently on metoprolol XL and losartan maximum dose for high blood pressure control.  She had several medication adjustments.  In the past she was on small dose of hydrochlorothiazide but developed low sodium and potassium on it while she had a UTI requiring hospitalization and was taken off it.  Subsequently she was put on amlodipine  but developed moderate edema on it.  Last visit because her blood pressure was on the low side we stopped amlodipine.  Currently she has been wearing compression stockings and swelling is gone.  Her blood pressure continues to fluctuate however.  In the office today is 145/85.  She does have history of LVH and diastolic dysfunction grade 2 with mild aortic stenosis and moderate aortic regurgitation based on echocardiogram in 2018.  Overall I would like to have her blood pressure little tighter control.    She also has anxiety that can be contributing to her hypertension as well.  She is on Paxil 20 mg a day and is followed by psychologist but tends to do well on the negative.    Review of Systems:  A comprehensive review of systems was performed and was otherwise negative    PFSH:  Social History: Reviewed  Social History     Tobacco Use   Smoking Status Never Smoker   Smokeless Tobacco Never Used     Social History     Social History Narrative    She is .  They do not have children.  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  0     amoxicillin (AMOXIL) 500 MG capsule Take 2,000 mg by mouth see administration instructions. Take one hour prior to dental appointments as directed.       ascorbic acid (ASCORBIC ACID WITH MINA HIPS) 500 MG tablet Take 500 mg by mouth daily.       b complex vitamins (B-COMPLEX) tablet Take 1 tablet by mouth daily.       butenafine (LOTRIMIN ULTRA) 1 % cream Apply 2ce a day for 10 days to corners of the mouth 30 g 0     CALCIUM-VITAMIN D3 ORAL Take 1 tablet by mouth daily.       cholecalciferol, vitamin D3, (VITAMIN D3) 2,000 unit Tab Take 2,000 Units by mouth daily.              conjugated estrogens (PREMARIN) vaginal cream  "Insert 0.5 g into the vagina 2 (two) times a week. 42.5 g 4     docosahexanoic acid/epa (FISH OIL ORAL) Take 1 capsule by mouth daily.       fluvoxaMINE (LUVOX) 25 MG tablet 2 tabs in the morning and 3 tabs in the evening  0     LACTOBACILLUS ACIDOPHILUS (PROBIOTIC ORAL) Take 1 capsule by mouth daily.       loperamide (IMODIUM A-D) 2 mg tablet Take 2 mg by mouth daily.       lysine (L-LYSINE) 500 mg cap Take 2 capsules by mouth daily.        metoprolol succinate (TOPROL-XL) 50 MG 24 hr tablet TAKE 1 TABLET (50 MG TOTAL) BY MOUTH DAILY. 90 tablet 3     MULTIVITAMIN ORAL Take 1 tablet by mouth daily.       PARoxetine (PAXIL) 10 MG tablet One tab in the morning and 2 tabs in the evening  0     spironolactone (ALDACTONE) 50 MG tablet Take 1 tablet (50 mg total) by mouth daily. 30 tablet 2     No current facility-administered medications for this visit.        Family History: Reviewed    Physical Exam:    Vitals:    09/09/19 1505 09/09/19 1511   BP: 160/74 138/76   Patient Site: Left Arm Left Arm   Patient Position: Sitting Sitting   Cuff Size: Adult Regular Adult Regular   Pulse: (!) 58    SpO2: 99%    Weight: 161 lb (73 kg)    Height: 5' 8\" (1.727 m)      Wt Readings from Last 3 Encounters:   09/09/19 161 lb (73 kg)   08/26/19 163 lb 7 oz (74.1 kg)   07/08/19 161 lb (73 kg)     Body mass index is 24.48 kg/m .    Constitutional:  Reveals a pleasant female.  Vitals:  Per nursing notes.  HEENT:No cervical LAD, no thyromegaly,  conjunctiva is pink, no scleral icterus, TMs are visualized and normal bl, oropharynx is clear, no exudates,   Cardiac:  Regular rate and rhythm, moderate systolic ejection murmur noted in right sternal border.Legs without edema.  Compression stockings on.  Palpation of the radial pulse regular.  Lungs: Clear to auscultation bl.  Respiratory effort normal.  Abdomen:positive BS, soft, nontender, nondistended.  No hepato-splenomagaly  Skin:   Without rash, bruise, or palpable lesions.  Rheumatologic: " Normal joints and nails of the hands.  Neurologic:  Cranial nerves II-XII intact.     Psychiatric: affect appropriate, memory intact.  Mild anxiety noted    Data Review:    Analysis and Summary of Old Records (2): yes      Records Requested (1): no      Other History Summarized (from other people in the room) (2): no    Radiology Tests Summarized (XRAY/CT/MRI/DXA) (1): no    Labs Reviewed (1): yes    Medicine Tests Reviewed (EKG/ECHO/COLONOSCOPY/EGD) (1): no    Independent Review of EKG or X-RAY (2): no

## 2021-06-01 NOTE — PATIENT INSTRUCTIONS - HE
1. Start spironolactone tonight. Stop Losartan. Check b/p at home and leave message Thursday with b/p readings at home.

## 2021-06-02 ENCOUNTER — RECORDS - HEALTHEAST (OUTPATIENT)
Dept: ADMINISTRATIVE | Facility: CLINIC | Age: 81
End: 2021-06-02

## 2021-06-02 VITALS — BODY MASS INDEX: 24.23 KG/M2 | WEIGHT: 157 LBS

## 2021-06-02 VITALS — BODY MASS INDEX: 25.3 KG/M2 | HEIGHT: 67 IN | WEIGHT: 161.2 LBS

## 2021-06-02 VITALS — BODY MASS INDEX: 24.16 KG/M2 | WEIGHT: 156.56 LBS

## 2021-06-02 NOTE — PATIENT INSTRUCTIONS - HE
1.Continue current b/p medications. Will check potassium level today. Occasional higher b/p 150-160 is O'k as long at it does not stay high for more then few days.    2. Flu shot today

## 2021-06-02 NOTE — PROGRESS NOTES
[facility] Follow Up Note    Assessment/Plan:    1. Hypertension  Pressure is currently well controlled on Toprol and spironolactone.  Will check BMP.  Comparing readings from her blood pressure machine to my reading was consistent.  - Basic Metabolic Panel    2. Flu vaccine need  - Influenza High Dose,Seasonal,PF 65+ Yrs      Brenda Godoy MD    Chief Complaint:  Chief Complaint   Patient presents with     Hypertension     follow up       History of Present Illness:  Ellen is a 79 y.o. female   with mild memory problems, anxiety, depression and OCD, high blood pressure, hyperlipidemia, aortic stenosis, history of collagenous colitis and IBS, osteopenia.  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  She is currently here for follow-up on her hypertension.    Patient blood pressure has fluctuated.  In the past addition of amlodipine caused severe edema and she had to stop.  Hydrochlorothiazide caused hypokalemia when she had urinary tract infection.  She was on Toprol and losartan and blood pressure was still 150-160.  With switch losartan to spironolactone 50 mg a day and currently blood pressure is much better.  Per her records for most part her blood pressure at home is 120-130/80.  She denies any dizziness or lightheadedness.  There are days when her blood pressure is 140 or even 150 but those are rare.  For now we will continue current treatment.    Did bring her blood pressure machine with her and when I compared it with my reading it was congruent    Patient does suffer from chronic foot pain from plantar fasciitis and sees a diet wrist for it.  Overall her pain is somewhat better.    Review of Systems:  A comprehensive review of systems was performed and was otherwise negative    PFSH:  Social History: Reviewed  Social History     Tobacco Use   Smoking Status Never Smoker   Smokeless Tobacco Never Used     Social History     Patient does not qualify to have social determinant  information on file (likely too young).   Social History Narrative    She is .  They do not have children.  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       Past History: Viewed  Current Outpatient Medications   Medication Sig Dispense Refill     acetaminophen (TYLENOL) 325 MG tablet Take 2 tablets (650 mg total) by mouth every 6 (six) hours as needed.  0     amoxicillin (AMOXIL) 500 MG capsule Take 2,000 mg by mouth see administration instructions. Take one hour prior to dental appointments as directed.       ascorbic acid (ASCORBIC ACID WITH MINA HIPS) 500 MG tablet Take 500 mg by mouth daily.       b complex vitamins (B-COMPLEX) tablet Take 1 tablet by mouth daily.       butenafine (LOTRIMIN ULTRA) 1 % cream Apply 2ce a day for 10 days to corners of the mouth 30 g 0     CALCIUM-VITAMIN D3 ORAL Take 1 tablet by mouth daily.       cholecalciferol, vitamin D3, (VITAMIN D3) 2,000 unit Tab Take 2,000 Units by mouth daily.              conjugated estrogens (PREMARIN) vaginal cream Insert 0.5 g into the vagina 2 (two) times a week. 42.5 g 4     docosahexanoic acid/epa (FISH OIL ORAL) Take 1 capsule by mouth daily.       fluvoxaMINE (LUVOX) 25 MG tablet 2 tabs in the morning and 3 tabs in the evening  0     LACTOBACILLUS ACIDOPHILUS (PROBIOTIC ORAL) Take 1 capsule by mouth daily.       loperamide (IMODIUM A-D) 2 mg tablet Take 2 mg by mouth daily.       lysine (L-LYSINE) 500 mg cap Take 2 capsules by mouth daily.        metoprolol succinate (TOPROL-XL) 50 MG 24 hr tablet TAKE 1 TABLET (50 MG TOTAL) BY MOUTH DAILY. 90 tablet 3     MULTIVITAMIN ORAL Take 1 tablet by mouth daily.       PARoxetine (PAXIL) 10 MG tablet One tab in the morning and 2 tabs in the evening  0     spironolactone (ALDACTONE) 50 MG tablet Take 1 tablet (50 mg total) by mouth daily. 30 tablet 2     No current facility-administered medications for this visit.        Family History:  "Reviewed    Physical Exam:    Vitals:    10/09/19 1421   BP: 126/60   Patient Site: Left Arm   Patient Position: Sitting   Cuff Size: Adult Regular   Pulse: 62   SpO2: 98%   Weight: 157 lb (71.2 kg)   Height: 5' 8\" (1.727 m)     Wt Readings from Last 3 Encounters:   10/09/19 157 lb (71.2 kg)   09/09/19 161 lb (73 kg)   08/26/19 163 lb 7 oz (74.1 kg)     Body mass index is 23.87 kg/m .    Constitutional:  Reveals a pleasant female.  Vitals:  Per nursing notes.  HEENT:No cervical LAD, no thyromegaly,  conjunctiva is pink, no scleral icterus, TMs are visualized and normal bl, oropharynx is clear, no exudates,   Cardiac:  Regular rate and rhythm,no murmurs, rubs, or gallops. Carotids without bruits. Legs without edema. Palpation of the radial pulse regular.  Lungs: Clear to auscultation bl.  Respiratory effort normal.  Abdomen:positive BS, soft, nontender, nondistended.  No hepato-splenomagaly  Rheumatologic: Arthritic changes in her hands and feet noted  Neurologic:  Cranial nerves II-XII intact.     Psychiatric: affect appropriate, memory intact.  Moderate anxiety noted.    Data Review:    Analysis and Summary of Old Records (2): yes      Records Requested (1): no      Other History Summarized (from other people in the room) (2): no    Radiology Tests Summarized (XRAY/CT/MRI/DXA) (1): no    Labs Reviewed (1): yes    Medicine Tests Reviewed (EKG/ECHO/COLONOSCOPY/EGD) (1): no    Independent Review of EKG or X-RAY (2): no      "

## 2021-06-03 VITALS
HEART RATE: 58 BPM | DIASTOLIC BLOOD PRESSURE: 76 MMHG | BODY MASS INDEX: 24.4 KG/M2 | HEIGHT: 68 IN | OXYGEN SATURATION: 99 % | SYSTOLIC BLOOD PRESSURE: 138 MMHG | WEIGHT: 161 LBS

## 2021-06-03 VITALS
WEIGHT: 157 LBS | BODY MASS INDEX: 23.79 KG/M2 | HEIGHT: 68 IN | DIASTOLIC BLOOD PRESSURE: 60 MMHG | SYSTOLIC BLOOD PRESSURE: 126 MMHG | HEART RATE: 62 BPM | OXYGEN SATURATION: 98 %

## 2021-06-03 VITALS — BODY MASS INDEX: 24.4 KG/M2 | WEIGHT: 161 LBS | HEIGHT: 68 IN

## 2021-06-03 VITALS — HEIGHT: 67 IN | WEIGHT: 159 LBS | BODY MASS INDEX: 24.96 KG/M2

## 2021-06-03 VITALS — BODY MASS INDEX: 24.22 KG/M2 | HEIGHT: 68 IN | WEIGHT: 159.8 LBS

## 2021-06-03 VITALS — WEIGHT: 159 LBS | BODY MASS INDEX: 24.96 KG/M2 | HEIGHT: 67 IN

## 2021-06-03 VITALS — HEIGHT: 68 IN | BODY MASS INDEX: 24.77 KG/M2 | WEIGHT: 163.44 LBS

## 2021-06-03 VITALS — HEIGHT: 68 IN | BODY MASS INDEX: 24.67 KG/M2 | WEIGHT: 162.8 LBS

## 2021-06-03 VITALS — HEIGHT: 68 IN | BODY MASS INDEX: 24.16 KG/M2 | WEIGHT: 159.44 LBS

## 2021-06-03 VITALS — WEIGHT: 159.8 LBS | BODY MASS INDEX: 24.3 KG/M2

## 2021-06-03 NOTE — TELEPHONE ENCOUNTER
Refill Approved    Rx renewed per Medication Renewal Policy. Medication was last renewed on 9/9/19.    Davida Ferreira, [facility] Triage/Med Refill 11/22/2019     Requested Prescriptions   Pending Prescriptions Disp Refills     spironolactone (ALDACTONE) 50 MG tablet [Pharmacy Med Name: SPIRONOLACTONE 50 MG TAB** 50 TAB] 30 tablet 2     Sig: TAKE 1 TABLET (50 MG TOTAL) BY MOUTH DAILY.       Diuretics/Combination Diuretics Refill Protocol  Passed - 11/22/2019  4:41 PM        Passed - Visit with PCP or prescribing provider visit in past 12 months     Last office visit with prescriber/PCP: 10/9/2019 Brenda Godoy MD OR same dept: 10/9/2019 Brenda Godoy MD OR same specialty: 10/9/2019 Brenda Godoy MD  Last physical: 3/1/2019 Last MTM visit: Visit date not found   Next visit within 3 mo: Visit date not found  Next physical within 3 mo: Visit date not found  Prescriber OR PCP: Brenda Godoy MD  Last diagnosis associated with med order: 1. Essential hypertension  - spironolactone (ALDACTONE) 50 MG tablet [Pharmacy Med Name: SPIRONOLACTONE 50 MG TAB** 50 TAB]; Take 1 tablet (50 mg total) by mouth daily.  Dispense: 30 tablet; Refill: 2    2. Edema, unspecified type  - spironolactone (ALDACTONE) 50 MG tablet [Pharmacy Med Name: SPIRONOLACTONE 50 MG TAB** 50 TAB]; Take 1 tablet (50 mg total) by mouth daily.  Dispense: 30 tablet; Refill: 2    If protocol passes may refill for 12 months if within 3 months of last provider visit (or a total of 15 months).             Passed - Serum Potassium in past 12 months      Lab Results   Component Value Date    Potassium 4.9 10/09/2019             Passed - Serum Sodium in past 12 months      Lab Results   Component Value Date    Sodium 136 10/09/2019             Passed - Blood pressure on file in past 12 months     BP Readings from Last 1 Encounters:   10/09/19 126/60             Passed - Serum Creatinine in past 12 months      Creatinine   Date Value Ref Range  Status   10/09/2019 1.42 (H) 0.60 - 1.10 mg/dL Final

## 2021-06-04 VITALS
HEART RATE: 68 BPM | SYSTOLIC BLOOD PRESSURE: 110 MMHG | HEIGHT: 68 IN | BODY MASS INDEX: 24.4 KG/M2 | DIASTOLIC BLOOD PRESSURE: 70 MMHG | OXYGEN SATURATION: 98 % | WEIGHT: 161 LBS

## 2021-06-04 VITALS
OXYGEN SATURATION: 99 % | WEIGHT: 164 LBS | BODY MASS INDEX: 25.74 KG/M2 | SYSTOLIC BLOOD PRESSURE: 118 MMHG | HEART RATE: 64 BPM | HEIGHT: 67 IN | DIASTOLIC BLOOD PRESSURE: 70 MMHG

## 2021-06-04 NOTE — PROGRESS NOTES
[facility] Follow Up Note    Assessment/Plan:    1. Essential hypertension  Patient did not tolerate amlodipine due to edema and hydrochlorothiazide due to low potassium.  On 50 mg of spironolactone creatinine did go up from 0.9-1.4.  She is currently taking 25 mg of spironolactone in addition to metoprolol and blood pressure today is excellent.  We will repeat creatinine function today.  If it is better she is requesting prescription for smaller pill of spironolactone so she does not have to cut pills.    2. Diarrhea, unspecified type  This is chronic.  Colonoscopy in 2009 was normal, no history of polyps.  Will check thyroid levels today.  She is drinking lactose-free milk.  Discussed increased Metamucil to prevent accidents.  If symptoms gets worse she can follow-up with GI.  Collagenous colitis is also listed her problem.  Discussed to stop vitamin C and E supplements and she is already on multivitamin.  - Thyroid Stimulating Hormone (TSH)    3. Major Depression, Single Episode In Remission  Mood is currently controlled on Luvox and Paxil.    4. FAMILIA (acute kidney injury) (H)  Creatinine went up after we put her on 50 mg of spironolactone.  We will check her renal function on a lower dose.  - Basic Metabolic Panel      Brenda Godoy MD    Chief Complaint:  Chief Complaint   Patient presents with     Hypertension     follow up       History of Present Illness:  Ellen is a 79 y.o. female with mild memory problems, anxiety, depression and OCD, high blood pressure, hyperlipidemia, aortic stenosis, history of collagenous colitis and IBS, osteopenia.  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  She is currently here for follow-up.    I have been working on adjusting her blood pressure medications.  Previously her hydrochlorothiazide was stopped due to low potassium levels and when we added amlodipine she developed too much edema from it.  Currently she is on Toprol and has been on spironolactone  50 mg a day.  Repeat creatinine did go up from 0.9-1.4 and she was recommended to cut spironolactone in half which she has been doing.  Blood pressure in the office today is excellent.  She denies any dizziness or lightheadedness.  In the morning blood pressure before taking medications is around 140.  She usually takes metoprolol in the morning and spironolactone in the evening.    She also has history of chronic diarrhea for years and often would have urgency.  On occasion she would take Imodium but not every day.  She did have colonoscopy in 2009 which showed no polyps and tortuous colon.  She denies any bloody or black stools.  She takes probiotics and 2 tablets of Metamucil a day.  She is drinking lactose-free milk.  Usually she has no more than 2 bowel movements in a day but they have watery.    Review of Systems:  A comprehensive review of systems was performed and was otherwise negative    PFSH:  Social History: Reviewed  Social History     Tobacco Use   Smoking Status Never Smoker   Smokeless Tobacco Never Used     Social History     Social History Narrative    She is .  They do not have children.  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  0     amoxicillin (AMOXIL) 500 MG capsule Take 2,000 mg by mouth see administration instructions. Take one hour prior to dental appointments as directed.       ascorbic acid (ASCORBIC ACID WITH MINA HIPS) 500 MG tablet Take 500 mg by mouth daily.       b complex vitamins (B-COMPLEX) tablet Take 1 tablet by mouth daily.       butenafine (LOTRIMIN ULTRA) 1 % cream Apply 2ce a day for 10 days to corners of the mouth 30 g 0     CALCIUM-VITAMIN D3 ORAL Take 1 tablet by mouth daily.       cholecalciferol, vitamin  "D3, (VITAMIN D3) 2,000 unit Tab Take 2,000 Units by mouth daily.              conjugated estrogens (PREMARIN) vaginal cream Insert 0.5 g into the vagina 2 (two) times a week. 42.5 g 4     docosahexanoic acid/epa (FISH OIL ORAL) Take 1 capsule by mouth daily.       fluvoxaMINE (LUVOX) 25 MG tablet 2 tabs in the morning and 3 tabs in the evening  0     LACTOBACILLUS ACIDOPHILUS (PROBIOTIC ORAL) Take 1 capsule by mouth daily.       loperamide (IMODIUM A-D) 2 mg tablet Take 2 mg by mouth daily.       lysine (L-LYSINE) 500 mg cap Take 2 capsules by mouth daily.        metoprolol succinate (TOPROL-XL) 50 MG 24 hr tablet TAKE 1 TABLET (50 MG TOTAL) BY MOUTH DAILY. 90 tablet 3     MULTIVITAMIN ORAL Take 1 tablet by mouth daily.       PARoxetine (PAXIL) 10 MG tablet One tab in the morning and 2 tabs in the evening  0     spironolactone (ALDACTONE) 50 MG tablet TAKE 1 TABLET (50 MG TOTAL) BY MOUTH DAILY. 90 tablet 3     No current facility-administered medications for this visit.        Family History: Reviewed    Physical Exam:    Vitals:    12/04/19 1204   BP: 110/70   Patient Site: Left Arm   Patient Position: Sitting   Cuff Size: Adult Regular   Pulse: 68   SpO2: 98%   Weight: 161 lb (73 kg)   Height: 5' 8\" (1.727 m)     Wt Readings from Last 3 Encounters:   12/04/19 161 lb (73 kg)   10/09/19 157 lb (71.2 kg)   09/09/19 161 lb (73 kg)     Body mass index is 24.48 kg/m .    Constitutional:  Reveals a pleasant female.  Vitals:  Per nursing notes.  HEENT:No cervical LAD, no thyromegaly,  conjunctiva is pink, no scleral icterus, TMs are visualized and normal bl, oropharynx is clear, no exudates,   Cardiac:  Regular rate and rhythm, she has slight systolic murmur right and left sternal border.  Legs without edema. Palpation of the radial pulse regular.  Lungs: Clear to auscultation bl.  Respiratory effort normal.  Abdomen:positive BS, soft, nontender, nondistended.  No hepato-splenomagaly  Skin:   Without  bruise, or palpable " lesions.  She has eczematous appearing lesions on her right arm.  Rheumatologic: Normal joints and nails of the hands.  Neurologic:  Cranial nerves II-XII intact.     Psychiatric: affect appropriate, memory intact.  Mild anxiety noted.    Data Review:    Analysis and Summary of Old Records (2): yes      Records Requested (1): no      Other History Summarized (from other people in the room) (2): no    Radiology Tests Summarized (XRAY/CT/MRI/DXA) (1): no    Labs Reviewed (1): yes    Medicine Tests Reviewed (EKG/ECHO/COLONOSCOPY/EGD) (1): colo    Independent Review of EKG or X-RAY (2): no

## 2021-06-04 NOTE — PATIENT INSTRUCTIONS - HE
1. Can increase metamucil to 4 tablets to help prevent accidents due to diarrhea    2. Stop vitamin C and E supplements, continue other supplements.    3. We will check renal function today, if good, I will send script for smaller dose Spironolactone to your pharmacy.     B/p is excellent today!

## 2021-06-05 VITALS
SYSTOLIC BLOOD PRESSURE: 104 MMHG | HEART RATE: 72 BPM | OXYGEN SATURATION: 98 % | BODY MASS INDEX: 25.58 KG/M2 | HEIGHT: 67 IN | DIASTOLIC BLOOD PRESSURE: 62 MMHG | WEIGHT: 163 LBS

## 2021-06-05 NOTE — TELEPHONE ENCOUNTER
Refill Approved    Rx renewed per Medication Renewal Policy. Medication was last renewed on 12/5/19.    Kyra Rodriguez, [facility] Triage/Med Refill 1/31/2020     Requested Prescriptions   Pending Prescriptions Disp Refills     spironolactone (ALDACTONE) 50 MG tablet [Pharmacy Med Name: SPIRONOLACTONE 50 MG TAB^^ 50 TAB] 30 tablet 11     Sig: TAKE 1 TABLET (50 MG TOTAL) BY MOUTH DAILY.       Diuretics/Combination Diuretics Refill Protocol  Passed - 1/30/2020 12:59 PM        Passed - Visit with PCP or prescribing provider visit in past 12 months     Last office visit with prescriber/PCP: 12/4/2019 Brenda Godoy MD OR same dept: 12/4/2019 Brenda Godoy MD OR same specialty: 12/4/2019 Brenda Godoy MD  Last physical: 3/1/2019 Last MTM visit: Visit date not found   Next visit within 3 mo: Visit date not found  Next physical within 3 mo: Visit date not found  Prescriber OR PCP: Brenda Godoy MD  Last diagnosis associated with med order: 1. Essential hypertension  - spironolactone (ALDACTONE) 50 MG tablet [Pharmacy Med Name: SPIRONOLACTONE 50 MG TAB^^ 50 TAB]; Take 1 tablet (50 mg total) by mouth daily.  Dispense: 30 tablet; Refill: 11    2. Edema, unspecified type  - spironolactone (ALDACTONE) 50 MG tablet [Pharmacy Med Name: SPIRONOLACTONE 50 MG TAB^^ 50 TAB]; Take 1 tablet (50 mg total) by mouth daily.  Dispense: 30 tablet; Refill: 11    If protocol passes may refill for 12 months if within 3 months of last provider visit (or a total of 15 months).             Passed - Serum Potassium in past 12 months      Lab Results   Component Value Date    Potassium 4.6 12/04/2019             Passed - Serum Sodium in past 12 months      Lab Results   Component Value Date    Sodium 139 12/04/2019             Passed - Blood pressure on file in past 12 months     BP Readings from Last 1 Encounters:   12/04/19 110/70             Passed - Serum Creatinine in past 12 months      Creatinine   Date Value Ref  Range Status   12/04/2019 1.30 (H) 0.60 - 1.10 mg/dL Final

## 2021-06-06 ENCOUNTER — HEALTH MAINTENANCE LETTER (OUTPATIENT)
Age: 81
End: 2021-06-06

## 2021-06-08 ENCOUNTER — COMMUNICATION - HEALTHEAST (OUTPATIENT)
Dept: INTERNAL MEDICINE | Facility: CLINIC | Age: 81
End: 2021-06-08

## 2021-06-08 NOTE — PROGRESS NOTES
"   [facility] Follow Up Note    Ellen Felipe   76 y.o. female    Date of Visit: 2/14/2017    Chief Complaint   Patient presents with     Abrasion     on scalp     GI Problem     Subjective  Ellen comes in today if she discovers something on the side of her face that she picked at and it started to become irritated.  She does have a dermatologist.  She also was seen recently by GI due to some irritable bowel problems which she is living with for most of her life and they did some blood work including a TSH which was 5.54.  We checked it in December of last year and was normal.  She struggles with her relationship with her  who did not see things similarly on many subjects.    ROS A comprehensive review of systems was performed and was otherwise negative    Social History:   Social History     Social History Narrative    She is .  They do not have children.  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       Medications were reconciled.  Allergies, social and family history, and the problem list were all reviewed and updated.    Exam  General Appearance: Pleasant and alert  Vitals:    02/14/17 0907   BP: 132/82   Pulse: 82   Resp: 14   Weight: 152 lb 8 oz (69.2 kg)   Height: 5' 7.5\" (1.715 m)      Body mass index is 23.53 kg/(m^2).  Wt Readings from Last 3 Encounters:   02/14/17 152 lb 8 oz (69.2 kg)   12/07/16 151 lb 9.6 oz (68.8 kg)   04/22/16 162 lb (73.5 kg)     HEENT: Sclera are clear.   Lungs: Normal respirations  Abdomen: Soft and nondistended  Extremities: No edema  Skin: Irritated seborrheic keratoses at the right temple  Neuro: Moves all extremities and has facial symmetry  Gait: Ambulates with a normal gait    Assessment/Plan  1. Seborrheic keratoses, inflamed  She was reassured.  If it does not resolve or changes then she does have a dermatologist that she could see.    2. Hypothyroidism, unspecified type  She prefers " to recheck this in a couple of months rather than starting medication.        Billing today was based on time. Total visit time of 30 minutes of direct patient contact with greater than 50% of this time devoted to counseling and/or coordinating care consisting of discussing diagnostic results, discussion about prognosis, risk and benefits of management options, importance of compliance with treatment options, patient education, discussing prescription medication including dosage, side effects, and precautions and discussing nonprescription OTC vitamins/minerals including dosage, side effects and precautions.        Lupe Quintero MD  2/14/2017    Much or all of the text in this note was generated through the use of Dragon Dictate voice-to-text software. Errors in spelling or words which seem out of context are unintentional. Sound alike errors, in particular, may have escaped editing.

## 2021-06-09 NOTE — PROGRESS NOTES
Preoperative Exam    Scheduled Procedure: Yag Capsulotomy Bilateral  Surgery Date:  7/14/20, 7/21/20  Surgery Location: [facility], [place], fax [phone]    Surgeon:  Dr. Ramsey    Assessment/Plan:     1. Preop exam for internal medicine  Patient is medically stable for this low-risk procedure.  We will check preoperative blood work  - Electrocardiogram Perform and Read  - Basic Metabolic Panel  - HM1(CBC and Differential)  - Vitamin D, Total (25-Hydroxy)  - HM1 (CBC with Diff)    2. Eye disease      3. Essential hypertension  In the past she was on hydrochlorothiazide and that was stopped due to hypokalemia and amlodipine caused severe edema.  Currently she is on atenolol and spironolactone.  Creatinine did increase from 0.9 to 1.4.  Previously decreased dose spironolactone to 25 mg but it appears that patient is still taking 50 mg.  Overall her blood pressure is well controlled.  Will check potassium level and creatinine today.  - Basic Metabolic Panel  - HM1(CBC and Differential)  - HM1 (CBC with Diff)    4. Vitamin D deficiency  She is on supplement  - Vitamin D, Total (25-Hydroxy)    5. Moderate episode of recurrent major depressive disorder (H)  Mood currently is worse.  She does have psychiatrist and counselor.  Luvox was increased 6 weeks ago but she still appears to be somewhat depressed.  Discussed increase her exercise.  She has appointment with psychiatry scheduled for next week.  - Thyroid Stimulating Hormone (TSH)     Surgical Procedure Risk: Low (reported cardiac risk generally < 1%)  Have you had prior anesthesia?: No  Have you or any family members had a previous anesthesia reaction:  No  Do you or any family members have a history of a clotting or bleeding disorder?: No  Cardiac Risk Assessment: no increased risk for major cardiac complications    APPROVAL GIVEN to proceed with proposed procedure, without further diagnostic evaluation    Please Note:    Functional Status: Partially  Dependent:  helps around the house  Patient plans to recover at home with family.     Subjective:      Ellen Felipe is a 79 y.o. female who presents for a preoperative consultation for laser eye surgery.    Ellen is a 79 y.o. female with mild memory problems, anxiety, depression and OCD, high blood pressure, hyperlipidemia, aortic stenosis, history of collagenous colitis and IBS, osteopenia.  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    Ellen has had cataract repair in the past but continues to have some difficulty with her vision and will have bilateral eye surgery done.    Previous surgical history significant for cataract extraction and tonsillectomy.  She denies any bleeding, clotting anesthesia problems.    She does have elevated blood pressure and we have had been modifying her treatments due to side effects from medications.  Currently she is on atenolol and spironolactone 50 mg.  We will check her electrolytes.  Since she has been on spironolactone her kidney function slightly worsened and I saw that I decrease her spironolactone to 25mg but per patient report she is still on 50 mg.  Blood pressure records at home show blood pressures in 130s.    She is not very physically active at the moment but usually tries to walk daily.  She denies any dizziness lightheadedness or chest pains.  EKG shows mild LVH but no other significant changes.    She denies any history of smoking, asthma or respiratory symptoms.  No shortness of breath, cough or fever.  Discussed that she should ask her surgeon's office to schedule coronavirus preprocedure.    She does suffer from anxiety and depression and OCD.  She is followed by psychiatrist.  6 weeks ago her medication was increased but she still appears to be moderately depressed.  I recommended that she is increasing her exercise.    All other systems reviewed and are negative, other than those listed in the HPI.    Pertinent History  Do you have  difficulty breathing or chest pain after walking up a flight of stairs: No  History of obstructive sleep apnea: No  Steroid use in the last 6 months: No  Frequent Aspirin/NSAID use: no  Prior Blood Transfusion: No  Prior Blood Transfusion Reaction: No  If for some reason prior to, during or after the procedure, if it is medically indicated, would you be willing to have a blood transfusion?:  There is no transfusion refusal.    Current Outpatient Medications   Medication Sig Dispense Refill     acetaminophen (TYLENOL) 325 MG tablet Take 2 tablets (650 mg total) by mouth every 6 (six) hours as needed.  0     amoxicillin (AMOXIL) 500 MG capsule Take 2,000 mg by mouth see administration instructions. Take one hour prior to dental appointments as directed.       ascorbic acid (ASCORBIC ACID WITH MINA HIPS) 500 MG tablet Take 500 mg by mouth daily.       b complex vitamins (B-COMPLEX) tablet Take 1 tablet by mouth daily.       butenafine (LOTRIMIN ULTRA) 1 % cream Apply 2ce a day for 10 days to corners of the mouth 30 g 0     CALCIUM-VITAMIN D3 ORAL Take 1 tablet by mouth daily.       cholecalciferol, vitamin D3, (VITAMIN D3) 2,000 unit Tab Take 2,000 Units by mouth daily.              conjugated estrogens (PREMARIN) vaginal cream Insert 0.5 g into the vagina 2 (two) times a week. 42.5 g 4     docosahexanoic acid/epa (FISH OIL ORAL) Take 1 capsule by mouth daily.       fluvoxaMINE (LUVOX) 25 MG tablet 2 tabs in the morning and 3 tabs in the evening (Patient taking differently: 50 mg. 2 tabs in the morning and 3 tabs in the evening)  0     LACTOBACILLUS ACIDOPHILUS (PROBIOTIC ORAL) Take 1 capsule by mouth daily.       loperamide (IMODIUM A-D) 2 mg tablet Take 2 mg by mouth daily.       lysine (L-LYSINE) 500 mg cap Take 2 capsules by mouth daily.        metoprolol succinate (TOPROL-XL) 50 MG 24 hr tablet Take 1 tablet (50 mg total) by mouth daily. 90 tablet 3     MULTIVITAMIN ORAL Take 1 tablet by mouth daily.        PARoxetine (PAXIL) 10 MG tablet One tab in the morning and 2 tabs in the evening  0     spironolactone (ALDACTONE) 25 MG tablet Take 1 tablet (25 mg total) by mouth daily. 90 tablet 1     spironolactone (ALDACTONE) 50 MG tablet TAKE 1 TABLET (50 MG TOTAL) BY MOUTH DAILY. 90 tablet 3     No current facility-administered medications for this visit.         Allergies   Allergen Reactions     Hydrochlorothiazide Other (See Comments)     Hyponatremia, hypokalemia     Sertraline Diarrhea     Bactrim [Sulfamethoxazole-Trimethoprim] Hives and Rash       Patient Active Problem List   Diagnosis     Benign Polyps Of The Large Intestine     Allergic Rhinitis     Diverticulosis     Osteopenia     Herpes Simplex     Hypercholesterolemia     Irritable bowel syndrome     Major Depression, Single Episode In Remission     Hypertension     OCD (obsessive compulsive disorder)     Aortic stenosis, mild     Collagenous colitis     Generalized anxiety disorder     Hyponatremia     Hypokalemia     Acute cystitis without hematuria     Urinary tract infection without hematuria, site unspecified     History of UTI gram-negative bacteremia with encephalopathy       Past Medical History:   Diagnosis Date     Depression      Hyperlipidemia      Hypertension     Created by Conversion  Replacement Utility updated for latest IMO load     IBS (irritable bowel syndrome)      OCD (obsessive compulsive disorder) 4/10/2015     OCD (obsessive compulsive disorder)        Past Surgical History:   Procedure Laterality Date     ID REMOVE TONSILS/ADENOIDS,<13 Y/O      Description: Tonsillectomy With Adenoidectomy;  Recorded: 05/27/2010;       Social History     Socioeconomic History     Marital status:      Spouse name: Not on file     Number of children: Not on file     Years of education: Not on file     Highest education level: Not on file   Occupational History     Not on file   Social Needs     Financial resource strain: Not on file     Food  "insecurity     Worry: Not on file     Inability: Not on file     Transportation needs     Medical: Not on file     Non-medical: Not on file   Tobacco Use     Smoking status: Never Smoker     Smokeless tobacco: Never Used   Substance and Sexual Activity     Alcohol use: Yes     Alcohol/week: 0.8 standard drinks     Types: 1 Standard drinks or equivalent per week     Comment: 1 glass of red wine     Drug use: No     Sexual activity: Not on file   Lifestyle     Physical activity     Days per week: Not on file     Minutes per session: Not on file     Stress: Not on file   Relationships     Social connections     Talks on phone: Not on file     Gets together: Not on file     Attends Alevism service: Not on file     Active member of club or organization: Not on file     Attends meetings of clubs or organizations: Not on file     Relationship status: Not on file     Intimate partner violence     Fear of current or ex partner: Not on file     Emotionally abused: Not on file     Physically abused: Not on file     Forced sexual activity: Not on file   Other Topics Concern     Not on file   Social History Narrative    She is .  They do not have children.  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         Objective:     Vitals:    07/03/20 1141   BP: 118/70   Pulse: 64   SpO2: 99%   Weight: 164 lb (74.4 kg)   Height: 5' 7\" (1.702 m)         Physical Exam:  General: well appearing female, alert and oriented x3  EYES: Eyelids, conjunctiva, and sclera were normal. Pupils were normal.   HEAD, EARS, NOSE, MOUTH, AND THROAT: no cervical LAD, no thyromegaly or nodules appreciated. TMs are visualized and normal, oropharynx is clear.  RESPIRATORY: respirations non labored, CTA bl, no wheezes, rales, no forced expiratory wheezing.  CARDIOVASCULAR: Heart rate and rhythm were normal. No murmurs, rubs,gallops. There was no peripheral edema. No carotid " bruits.  GASTROINTESTINAL: Positive bowel sounds, abdomen is soft, non tender, non distended.     MUSCULOSKELETAL: Muscle mass was normal for age. No joint synovitis or deformity.  LYMPHATIC: There were no enlarged nodes palpable.  SKIN/HAIR/NAILS: Skin color was normal.  No rashes.  NEUROLOGIC: The patient was alert and oriented.  Speech was normal.  There is no facial asymmetry.   PSYCHIATRIC: Mood is slightly down, thought processes linear, no flight of ideas or pressured speech.        Patient Instructions   1. Check with surgeon's office for coronavirus testing before surgery    2. Follow up with psychiatry          EKG: Normal sinus rhythm, no ST or T changes, mild LVH    Labs:  Recent Results (from the past 24 hour(s))   Electrocardiogram Perform and Read    Collection Time: 07/03/20 11:52 AM   Result Value Ref Range    SYSTOLIC BLOOD PRESSURE      DIASTOLIC BLOOD PRESSURE      VENTRICULAR RATE 62 BPM    ATRIAL RATE 62 BPM    P-R INTERVAL 148 ms    QRS DURATION 80 ms    Q-T INTERVAL 394 ms    QTC CALCULATION (BEZET) 399 ms    P Axis 48 degrees    R AXIS -21 degrees    T AXIS 53 degrees    MUSE DIAGNOSIS       Normal sinus rhythm  Moderate voltage criteria for LVH, may be normal variant  Borderline ECG  When compared with ECG of 06-JUN-2019 13:17,  T wave inversion no longer evident in Anterior leads  QT has shortened         Immunization History   Administered Date(s) Administered     DT (pediatric) 10/18/2005     Influenza high dose,seasonal,PF, 65+ yrs 11/03/2015, 10/24/2016, 09/28/2017, 09/18/2018, 10/09/2019     Influenza, Seasonal, Inj PF IIV3 09/20/2013     Influenza, inj, historic,unspecified 10/25/2007, 11/06/2008, 11/25/2009, 10/28/2010, 09/15/2011     Influenza, seasonal,quad inj 6-35 mos 10/22/2012, 10/02/2014     Pneumo Conj 13-V (2010&after) 11/03/2015     Pneumo Conj 7-V(before 2010) 10/22/2014     Pneumo Polysac 23-V 12/12/2006, 10/22/2012     Td,adult,historic,unspecified 10/18/2005     Tdap  06/28/2013     ZOSTER, LIVE 01/06/2009           Electronically signed by Brenda Godoy MD 07/03/20 11:43 AM

## 2021-06-09 NOTE — PROGRESS NOTES
Preoperative Consultation    Ellen Felipe   76 y.o.  female    Date of visit: 3/20/2017    This is a preoperative consultation requested by Dr. Reyna in preparation for colonoscopy on April 3 at [facility].    HPI:  Ellen comes in today prior to having a colonoscopy.  She ha met a new GI physician and they want to do a colonoscopy at the hospital with biopsies.  She is here for preoperative consultation is required.  She suffers from chronic diarrhea and it has been touted to be irritable bowel syndrome.  She is otherwise feeling fairly well and has no new problems or concerns.  She is tolerated previous colonoscopies and other procedures without difficulty.      Review of systems:  A comprehensive review of systems was performed and was otherwise negative    Allergies   Allergen Reactions     Sertraline Diarrhea       Recent anticoagulant use: no    Personal or family history of clotting disorder: no    Prolonged steroid use in the past year: no    Past difficulty with anesthesia:  no    Family history of difficulty with anesthesia: no    Current cardiopulmonary symptoms: no    Patient Active Problem List   Diagnosis     Benign Polyps Of The Large Intestine     Allergic Rhinitis     Diverticulosis     Osteopenia     Herpes Simplex     Hypercholesterolemia     Irritable Bowel Syndrome     Major Depression, Single Episode In Remission     Hypertension     OCD (obsessive compulsive disorder)     Aortic stenosis, mild     Past Medical History:   Diagnosis Date     Hypertension     Created by Conversion  Replacement Utility updated for latest IMO load     OCD (obsessive compulsive disorder) 4/10/2015      Past Surgical History:   Procedure Laterality Date     SD REMOVE TONSILS/ADENOIDS,<11 Y/O      Description: Tonsillectomy With Adenoidectomy;  Recorded: 2010;     Family History   Problem Relation Age of Onset     Heart failure Mother       age 96     Heart failure Father       age 85      Social History   Substance Use Topics     Smoking status: Never Smoker     Smokeless tobacco: Not on file     Alcohol use 0.5 oz/week     1 drink(s) per week     Social History     Social History Narrative    She is .  They do not have children.  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     Current Medications:  Current Outpatient Prescriptions   Medication Sig     ascorbic acid (ASCORBIC ACID WITH MINA HIPS) 500 MG tablet Take 500 mg by mouth daily.     aspirin 81 MG EC tablet Take 81 mg by mouth daily.     b complex vitamins (B-COMPLEX) tablet Take 1 tablet by mouth daily.     calcium carbonate-vitamin D3 600 mg calcium- 200 unit cap Take 1 tablet by mouth daily.     cholecalciferol, vitamin D3, (VITAMIN D3) 2,000 unit Tab Take 1 tablet by mouth daily.     ciclopirox (PENLAC) 8 % solution Apply over nail and surrounding skin. Apply daily over previous coat. After seven (7) days, may remove with alcohol and continue cycle.     erythromycin with ethanol (EMGEL) 2 % gel Apply topically daily.     FLUOCINOLONE ACETONIDE (FLUOCINOLONE TOP) Apply topically.     fluocinonide (LIDEX) 0.05 % cream      fluvoxaMINE (LUVOX) 50 MG tablet Take 100 mg by mouth bedtime.      LACTOBACILLUS ACIDOPHILUS (PROBIOTIC ORAL) Take 1 capsule by mouth daily.     loperamide (IMODIUM) 2 mg capsule Take 2 mg by mouth daily.     losartan (COZAAR) 100 MG tablet TAKE 1 TABLET BY MOUTH DAILY     lysine (L-LYSINE) 500 mg cap Take 2 capsules by mouth daily.      metoprolol succinate (TOPROL-XL) 25 MG TAKE 1 TABLET BY MOUTH DAILY     MULTIVITAMIN ORAL Take 1 tablet by mouth daily.     omega-3 fatty acids 1,000 mg cap Take 1 capsule by mouth daily.     PARoxetine (PAXIL) 10 MG tablet Take 2 tablet every AM and 1 tab every PM= total 30mg     penicillin VK (PEN VK) 500 MG tablet 1 tab twice daily     prednisoLONE acetate (PRED-FORTE) 1 % ophthalmic suspension        Physical  "Exam:    General Appearance:   Pleasant and alert    Vitals:    03/20/17 1154   BP: 124/70   Pulse: 82   Resp: 10   Weight: 151 lb (68.5 kg)   Height: 5' 7.5\" (1.715 m)     Body mass index is 23.3 kg/(m^2).    EYES: Eyelids, conjunctiva, and sclera were normal. Pupils were normal.   HEAD, EARS, NOSE, MOUTH, AND THROAT: Head is atraumatic, ears and canals are normal with normal tympanic membranes.  Nose mouth and throat are without lesions.  NECK: Neck appearance was normal. There were no neck masses and the thyroid was not enlarged.  RESPIRATORY: Normal respirations.  Lung sounds were equal bilaterally.  CARDIOVASCULAR: Heart rate and rhythm were normal.  S1 and S2 were normal and is a 1/6 systolic murmur. There was no peripheral edema.  GASTROINTESTINAL: The abdomen was soft and nondistended.     MUSCULOSKELETAL: Skeletal configuration was normal and muscle mass was normal for age. Joint appearance was overall normal.  LYMPHATIC: There were no enlarged nodes palpable.  SKIN/HAIR/NAILS: Skin color was normal.  No rashes.  NEUROLOGIC: The patient was alert and oriented.  Speech was normal.  There is no facial asymmetry.   PSYCHIATRIC:  Mood and affect were normal.         EKG (done here and read by me) Normal sinus rhythm with no acute ST-T changes    Results for orders placed or performed in visit on 03/20/17   Electrocardiogram Perform and Read   Result Value Ref Range    SYSTOLIC BLOOD PRESSURE  mmHg    DIASTOLIC BLOOD PRESSURE  mmHg    VENTRICULAR RATE 66 BPM    ATRIAL RATE 66 BPM    P-R INTERVAL 152 ms    QRS DURATION 80 ms    Q-T INTERVAL 418 ms    QTC CALCULATION (BEZET) 438 ms    P Axis 52 degrees    R AXIS -24 degrees    T AXIS 59 degrees    MUSE DIAGNOSIS       Normal sinus rhythm  Moderate voltage criteria for LVH, may be normal variant  Nonspecific ST and T wave abnormality  Abnormal ECG  No previous ECGs available         Assessment and Plan:  Ellen Felipe was seen in preoperative consultation in " preparation for colonoscopy surgery.  This is a low risk surgery and the patient has no increased risk for major cardiac complications based on exam and history and appears to be medically stable and an acceptable candidate for the proposed surgery/procedure with appropriate anesthesia.    I have recommended the following medication adjustments preoperatively:    Patient Instructions     Hold your AM medications until after your colonoscopy.    No aspirin or NSAIDs for 1 week prior to the procedure.      Also discussed during this visit:    1. Pre-operative examination for internal medicine  She is a low risk for proposed procedure.  - Electrocardiogram Perform and Read  - Basic Metabolic Panel  - HM2(CBC w/o Differential)    2. Irritable bowel syndrome with diarrhea  Hopefully biopsies will be diagnostic, suspect she may have chronic microscopic colitis.        Lupe Quintero MD  Internal Medicine  [facility]  Contact me at [phone]    Much or all of the text in this note was generated through the use of Dragon Dictate voice-to-text software. Errors in spelling or words which seem out of context are unintentional. Sound alike errors, in particular, may have escaped editing.

## 2021-06-09 NOTE — PATIENT INSTRUCTIONS - HE
1. Check with surgeon's office for coronavirus testing before surgery    2. Follow up with psychiatry

## 2021-06-12 NOTE — TELEPHONE ENCOUNTER
Please let pt know results:    Stool cultures are all negative, no infection.  Collagenous collitis is listed on your medical history and I wander if this can be a flair of it. You can try taking bismuth 525 mg 3 times a day to help diarrhea. I sent script to your pharmacy: your insurance only covers syrup which I ordered, but it might be easier to get pepto-bismol tabs and take them instead. You cam see what is easier for you.     If not better, please schedule visit with gastroenterologist.I put referral in. Of note, bismuth can turn your stool back.

## 2021-06-12 NOTE — TELEPHONE ENCOUNTER
Please let pt know,  I sent result note to her through my chart.    Her Kidney function is sluggish but stable. Since b/p was on the normal low side in the office, you can cut spironolactone tab in 1/2. Let me know if need me to send a smaller tab to the pharmacy or if she can cut the current tabs.

## 2021-06-12 NOTE — PROGRESS NOTES
"[facility] Follow Up Note    Ellen Felipe   76 y.o. female    Date of Visit: 8/11/2017    Chief Complaint   Patient presents with     Medication Management     Subjective  Ellen comes in today as she has been having headaches in the evening and her blood pressure has been high with systolic levels of 150-160.  She has more stress as she and her  are letting a homeless person live with them for a few more weeks.      ROS A comprehensive review of systems was performed and was otherwise negative    Social History:   Social History     Social History Narrative    She is .  They do not have children.  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       Medications were reconciled.  Allergies, social and family history, and the problem list were all reviewed and updated.    Exam  General Appearance: Pleasant and alert  Vitals:    08/11/17 1010   BP: 138/72   Pulse: 72   Resp: 12   Weight: 150 lb (68 kg)   Height: 5' 7.5\" (1.715 m)      Body mass index is 23.15 kg/(m^2).  Wt Readings from Last 3 Encounters:   08/11/17 150 lb (68 kg)   03/20/17 151 lb (68.5 kg)   02/14/17 152 lb 8 oz (69.2 kg)     HEENT: Sclera are clear.   Lungs: Normal respirations  Abdomen: Soft and nondistended  Extremities: No edema  Skin: No rashes  Neuro: Moves all extremities and has facial symmetry  Gait: Ambulates with a normal gait    Assessment/Plan  1. Essential hypertension  Blood pressure is not at goal.  Will increase toprol to 50 mg daily and have her take it in the morning instead.  Continue with the losartan in the evening.  See me in 4 weeks for follow-up.  - metoprolol succinate (TOPROL-XL) 50 MG 24 hr tablet; Take 1 tablet (50 mg total) by mouth daily.  Dispense: 90 tablet; Refill: prn    2. Collagenous colitis  Proven on biopsies done earlier this year.  She has stopped her aspirin and is using imodium and this is currently well " controlled.          Lupe Quintero MD  8/11/2017    Much or all of the text in this note was generated through the use of Dragon Dictate voice-to-text software. Errors in spelling or words which seem out of context are unintentional. Sound alike errors, in particular, may have escaped editing.

## 2021-06-12 NOTE — TELEPHONE ENCOUNTER
Spoke with pt and relayed pcp message.  Pt understanding and requested a new prescription for spironolactone sent to her pharmacy.  Order pended.

## 2021-06-12 NOTE — PROGRESS NOTES
Assessment and Plan:     1. Routine general medical examination at a health care facility  We will order mammogram for next year.  Given her age most likely that would be the last one.  She denies any family history of breast cancer.  Also due for bone density test.    2. Arthritis of knee  She does have chronic knee arthritis and stiffness.  Discussed that lack of exercise will make her prone to balance problems secondary to stiffness.  She is interested in doing physical therapy for strength and balance training  - Ambulatory referral to PT/OT    3. Balance problems  Multifactorial including chronic knee arthritis and stiffness and toe deformities.  No recent falls.  - Ambulatory referral to PT/OT    4. Post-menopausal  She does have moderate osteopenia and is due for repeat bone density  - DXA Bone Density Scan; Future    5. Diarrhea, unspecified type  History of IBS and collagenous colitis.  She has having 4-5 watery bowel movements a day.  Colonoscopy in 2001, 2004 and 2008 were negative for polyps.  We will start with blood work and stool cultures.  If it is negative we can consider trial of bismuth and if nothing helps will refer her to gastroenterology.  - Thyroid Stimulating Hormone (TSH)  - Celiac(Gluten)Antibody Panel  - Culture, Stool; Future  - C. difficile Toxigenic by PCR; Future  - HM1(CBC and Differential)  - Comprehensive Metabolic Panel    6. Vitamin D deficiency  Patient was unclear which dose of vitamin D she is taking 2000 or 5000 on might be taking both.  I told her to stop 5000 units and only take 2000 units and will check levels today  - Vitamin D, Total (25-Hydroxy)    7. Fatigue, unspecified type  - HM1(CBC and Differential)  - Comprehensive Metabolic Panel    8. Encounter for screening mammogram for breast cancer  - Mammo Screening Bilateral; Future    9. Major Depression, Single Episode In Remission  This is chronic.  Overall her mood has been stable.  Her psychiatrist will be  retiring.  She will ask them to transfer her records here.  She is currently on Lovenox 50 mg daily.  As well as paroxetine.  She is hoping to get refills from our office going forward.    The patient's current medical problems were reviewed.    The following health maintenance schedule was reviewed with the patient and provided in printed form in the after visit summary:   Health Maintenance   Topic Date Due     DXA SCAN  02/22/2020     MEDICARE ANNUAL WELLNESS VISIT  10/08/2021     FALL RISK ASSESSMENT  10/08/2021     TD 18+ HE  06/28/2023     LIPID  03/01/2024     ADVANCE CARE PLANNING  03/01/2024     DEPRESSION ACTION PLAN  Completed     Pneumococcal Vaccine: 65+ Years  Completed     INFLUENZA VACCINE RULE BASED  Completed     ZOSTER VACCINES  Completed     Pneumococcal Vaccine: Pediatrics (0 to 5 Years) and At-Risk Patients (6 to 64 Years)  Aged Out     HEPATITIS B VACCINES  Aged Out        Subjective:   Chief Complaint: Ellen Felipe is an 80 y.o. female here for an Annual Wellness visit.     HPI: Ellen is a 80 y.o female with mild memory problems, anxiety, depression and OCD, high blood pressure, hyperlipidemia, aortic stenosis, history of collagenous colitis and IBS, osteopenia.  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  She is currently here for wellness exam.    Reviewing her questionnaire it appears that she could improve by increasing daily exercise and amount of fruits and vegetables that she eats.  She does drink 1 glass of wine with dinner.  She denies any problems with urination or sleep at night.  She did have an eye exam earlier this year) no change in her glasses.  She denies any problems with hearing or falls.    Her balance are a little off and she is afraid of falling.  She does have toe deformities which affect her balance.  She has not fallen recently.  Discussed physical therapy for balance and strength training.    She continues to have diarrhea, up to 4-5 times a day.  She was  diagnosed with collagenous colitis and IBS in the past.  She had colonoscopy done in 2001, 2004 and 2008 which did not show any polyps but she did have redundant colon.  She is currently on vitamin C supplements.  Discussed to have stool cultures done and stop vitamin C.  She will use Metamucil as needed.  We can try bismuth empirically for her symptoms if above tests are normal I will refer her to gastroenterology.  Of note her  also has collagenous colitis which was recently diagnosed with suffers from diarrhea.    She does suffer from longstanding depression.  She is under the care of psychiatrist but he will be retiring.  She reports that her mood has been stable.  She is hoping to get refills on his psychiatric medications remaining forward.    Review of Systems: She denies any dizziness or lightheadedness.  No shortness of breath, chest pains or abdominal pains.  Please see above.  The rest of the review of systems are negative for all systems.    Patient Care Team:  Brenda Godoy MD as PCP - General (Internal Medicine)  Brenda Godoy MD as Assigned PCP     Patient Active Problem List   Diagnosis     Benign Polyps Of The Large Intestine     Allergic Rhinitis     Diverticulosis     Osteopenia     Herpes Simplex     Hypercholesterolemia     Irritable bowel syndrome     Major Depression, Single Episode In Remission     Hypertension     OCD (obsessive compulsive disorder)     Aortic stenosis, mild     Collagenous colitis     Generalized anxiety disorder     Hyponatremia     Hypokalemia     Acute cystitis without hematuria     Urinary tract infection without hematuria, site unspecified     History of UTI gram-negative bacteremia with encephalopathy     Past Medical History:   Diagnosis Date     Depression      Hyperlipidemia      Hypertension     Created by Conversion  Replacement Utility updated for latest IMO load     IBS (irritable bowel syndrome)      OCD (obsessive compulsive disorder)  4/10/2015     OCD (obsessive compulsive disorder)       Past Surgical History:   Procedure Laterality Date     HI REMOVE TONSILS/ADENOIDS,<12 Y/O      Description: Tonsillectomy With Adenoidectomy;  Recorded: 2010;      Family History   Problem Relation Age of Onset     Heart failure Mother          age 96     Heart failure Father          age 85     Diabetes type II Sister      Diabetes type II Brother      Diabetes type II Brother       Social History     Socioeconomic History     Marital status:      Spouse name: Not on file     Number of children: Not on file     Years of education: Not on file     Highest education level: Not on file   Occupational History     Not on file   Social Needs     Financial resource strain: Not on file     Food insecurity     Worry: Not on file     Inability: Not on file     Transportation needs     Medical: Not on file     Non-medical: Not on file   Tobacco Use     Smoking status: Never Smoker     Smokeless tobacco: Never Used   Substance and Sexual Activity     Alcohol use: Yes     Alcohol/week: 0.8 standard drinks     Types: 1 Standard drinks or equivalent per week     Comment: 1 glass of red wine     Drug use: No     Sexual activity: Not on file   Lifestyle     Physical activity     Days per week: Not on file     Minutes per session: Not on file     Stress: Not on file   Relationships     Social connections     Talks on phone: Not on file     Gets together: Not on file     Attends Christian service: Not on file     Active member of club or organization: Not on file     Attends meetings of clubs or organizations: Not on file     Relationship status: Not on file     Intimate partner violence     Fear of current or ex partner: Not on file     Emotionally abused: Not on file     Physically abused: Not on file     Forced sexual activity: Not on file   Other Topics Concern     Not on file   Social History Narrative    She is .  They do not have children.  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      Current Outpatient Medications   Medication Sig Dispense Refill     acetaminophen (TYLENOL) 325 MG tablet Take 2 tablets (650 mg total) by mouth every 6 (six) hours as needed.  0     amoxicillin (AMOXIL) 500 MG capsule Take 2,000 mg by mouth see administration instructions. Take one hour prior to dental appointments as directed.       ascorbic acid (ASCORBIC ACID WITH MINA HIPS) 500 MG tablet Take 500 mg by mouth daily.       b complex vitamins (B-COMPLEX) tablet Take 1 tablet by mouth daily.       butenafine (LOTRIMIN ULTRA) 1 % cream Apply 2ce a day for 10 days to corners of the mouth 30 g 0     CALCIUM-VITAMIN D3 ORAL Take 1 tablet by mouth daily.       cholecalciferol, vitamin D3, (VITAMIN D3) 2,000 unit Tab Take 2,000 Units by mouth daily.              conjugated estrogens (PREMARIN) vaginal cream Insert 0.5 g into the vagina 2 (two) times a week. 42.5 g 4     docosahexanoic acid/epa (FISH OIL ORAL) Take 1 capsule by mouth daily.       fluvoxaMINE (LUVOX) 25 MG tablet 2 tabs in the morning and 3 tabs in the evening (Patient taking differently: 50 mg. 2 tabs in the morning and 3 tabs in the evening)  0     LACTOBACILLUS ACIDOPHILUS (PROBIOTIC ORAL) Take 1 capsule by mouth daily.       loperamide (IMODIUM A-D) 2 mg tablet Take 2 mg by mouth daily.       lysine (L-LYSINE) 500 mg cap Take 2 capsules by mouth daily.        metoprolol succinate (TOPROL-XL) 50 MG 24 hr tablet Take 1 tablet (50 mg total) by mouth daily. 90 tablet 3     MULTIVITAMIN ORAL Take 1 tablet by mouth daily.       PARoxetine (PAXIL) 10 MG tablet One tab in the morning and 2 tabs in the evening  0     spironolactone (ALDACTONE) 50 MG tablet Take 1 tablet (50 mg total) by mouth daily. 90 tablet 3     No current facility-administered medications for this visit.       Objective:   Vital Signs:   Visit Vitals  /62 (Patient Site: Left Arm, Patient  "Position: Sitting, Cuff Size: Adult Regular)   Pulse 72   Ht 5' 6.5\" (1.689 m)   Wt 163 lb (73.9 kg)   SpO2 98%   BMI 25.91 kg/m           VisionScreening:  She sees ophthalmologist annually    PHYSICAL EXAM  General: well appearing female, alert and oriented x3  EYES: Eyelids, conjunctiva, and sclera were normal. Pupils were normal.   HEAD, EARS, NOSE, MOUTH, AND THROAT: no cervical LAD, no thyromegaly or nodules appreciated. TMs are visualized and normal, oropharynx is clear.  RESPIRATORY: respirations non labored, CTA bl, no wheezes, rales, no forced expiratory wheezing.  CARDIOVASCULAR: Heart rate and rhythm were normal. No murmurs, rubs,gallops. There was no peripheral edema. No carotid bruits.  GASTROINTESTINAL: Positive bowel sounds, abdomen is soft, non tender, non distended.     MUSCULOSKELETAL: Muscle mass was normal for age. No joint synovitis or deformity.  LYMPHATIC: There were no enlarged nodes palpable.  SKIN/HAIR/NAILS: Skin color was normal.  No rashes.  NEUROLOGIC: The patient was alert and oriented.  Speech was normal.  There is no facial asymmetry.   PSYCHIATRIC:  Mood and affect were on the low side which is chronic for her.  Breast exam: No axilla lymphadenopathy, breast masses or skin changes appreciated        Assessment Results 10/8/2020   Activities of Daily Living No help needed   Instrumental Activities of Daily Living 1 - Full function   Get Up and Go Score Less than 12 seconds   Mini Cog Total Score 4   Some recent data might be hidden     A Mini-Cog score of 0-2 suggests the possibility of dementia, score of 3-5 suggests no dementia      Identified Health Risks:     She is at risk for lack of exercise and has been provided with information to increase physical activity for the benefit of her well-being.  The patient was counseled and encouraged to consider modifying their diet and eating habits. She was provided with information on recommended healthy diet options.  The patient reports " that she has difficulty with instrumental activities of daily living.  She was provided with a list of local organizations that provide support services and advised to make a follow up appointment in 24 weeks to address this further.   Information on urinary incontinence and treatment options given to patient.  The patient was provided with suggestions to help her develop a healthy emotional lifestyle.   Patient's advanced directive was discussed and I am comfortable with the patient's wishes.

## 2021-06-13 NOTE — PROGRESS NOTES
[facility] Daily Progress     Patient Name: Ellen Felipe  Date: 2020  Visit #:20  PTA visit #:  3  Referral Diagnosis: Arthritis of knee  Referring provider: Brenda Godoy MD  Visit Diagnosis:     ICD-10-CM    1. Bilateral chronic knee pain  M25.561     M25.562     G89.29    2. Weakness of both lower extremities  R29.898    3. Decreased range of motion (ROM) of right knee  M25.661    4. Balance problems  R26.89          Assessment:   Impairments in  pain, posture, ROM, strength, gait/locomotion and balance  Patient's signs and symptoms are consistent with Knee OA and secondary balance problems and associated weakness.  Patient responded well to instructed exercises.  Prognosis to achieve goals is  good   Pt. is appropriate for skilled PT intervention as outlined in the Plan of Care (POC).  Pt stats the exercises are helpful but unable to do every day     Goal Status: progressing  Pt. will demonstrate/verbalize independence in self-management of condition in : 6 weeks  Pt. will be independent with home exercise program in : 6 weeks  Patient will ascend / descend: stairs;with railing;with recipricol gait;with less difficulty;in 6 weeks  Patient will transfer: sit/stand;for in/out of chair;with less difficulty;in 6 weeks    Patient will increase : LEFS score;by _ points;for improved quality of function;for improved quality of life;in 6 weeks;Comment  by ___ points: 9  Comment: Initial Score 57      Plan / Patient Education:     Continue see in 2 weeks   Assess goals/ outcomes   . Review   Step overs/ side step overs, tandem walking and mini squat     Subjective:     Pain Ratin/10 2/10 intermittent pain  Pt states she prefers to go to [place] due to the distance in driving  Pt asked about getting up/off floor  Denies falls  States she isn't getting exercises in every day  Reports exercises helpful but not doing daily      Objective:   Pt arrived 8 mins late  Warm up with  nustep  5 min level 3  Reviewed HEP cues needed  Exercise      Treatment Today   12/1/2020    TREATMENT MINUTES COMMENTS   Evaluation     Self-care/ Home management     Manual therapy     Neuromuscular Re-education     Therapeutic Activity     Therapeutic Exercises 23 Added step overs (1/2 foam roll)  Side step overs  Standing knee flexion  Transfer on/off floor   Gait training     Modality__________________                Total 23    Blank areas are intentional and mean the treatment did not include these items.       Ernestine Dolan PTA  12/1/2020

## 2021-06-13 NOTE — PROGRESS NOTES
[facility] Daily Progress     Patient Name: Ellen Felipe  Date: 2020  Visit #:320  PTA visit #:  2  Referral Diagnosis: Arthritis of knee  Referring provider: Brenda Godoy MD  Visit Diagnosis:     ICD-10-CM    1. Bilateral chronic knee pain  M25.561     M25.562     G89.29    2. Weakness of both lower extremities  R29.898    3. Decreased range of motion (ROM) of right knee  M25.661    4. Balance problems  R26.89          Assessment:   Impairments in  pain, posture, ROM, strength, gait/locomotion and balance  Patient's signs and symptoms are consistent with Knee OA and secondary balance problems and associated weakness.  Patient responded well to instructed exercises.  Prognosis to achieve goals is  good   Pt. is appropriate for skilled PT intervention as outlined in the Plan of Care (POC).     Goal Status: progressing  Pt. will demonstrate/verbalize independence in self-management of condition in : 6 weeks  Pt. will be independent with home exercise program in : 6 weeks  Patient will ascend / descend: stairs;with railing;with recipricol gait;with less difficulty;in 6 weeks  Patient will transfer: sit/stand;for in/out of chair;with less difficulty;in 6 weeks    Patient will increase : LEFS score;by _ points;for improved quality of function;for improved quality of life;in 6 weeks;Comment  by ___ points: 9  Comment: Initial Score 57      Plan / Patient Education:     Continue POC  Plan for next visit: see 1x/week for 2 more visits then reassess progress/needs.  May continue up to 3 more to complete exercise instruction for home program.  r  review  tandem walking and mini squat     Subjective:     Pain Ratin/10 2/10 intermittent pain  Denies falls  States she isn't getting exercises in every day      Objective:   Pt arrived 10 mins late  Warm up with nustep  5 min level 3  Reviewed HEP cues needed  Exercises:  Exercise #1: Sit to stand 4x without UE assist but stressful to  "knees and fatiguing  Comment #1: Standing hip abduction, 10x 2\" each each-H  Exercise #2: Standing forward SLR, 10x 2\" each-H  Comment #2: Glut set with bridge, 10x-H  Exercise #3: Issued Home Personal exercise log to record walking and exercise sessions.  Comment #3: heel raises 10x  Exercise #4: stair climbing  Comment #4: seated hip ABD with OTB 10x  Exercise #5: mini squats 5x  Comment #5: tandem stance 30 sec johanna        Treatment Today   11/24/2020    TREATMENT MINUTES COMMENTS   Evaluation     Self-care/ Home management     Manual therapy     Neuromuscular Re-education     Therapeutic Activity     Therapeutic Exercises 23 Added bridges  Mini squats  Tandem stance   Gait training     Modality__________________                Total 23    Blank areas are intentional and mean the treatment did not include these items.       Ernestine Dolan PTA  11/24/2020    "

## 2021-06-13 NOTE — PROGRESS NOTES
[facility] Daily Progress     Patient Name: Ellen Felipe  Date: 12/15/2020  Visit #:5/8 12/28/20  PTA visit #:  3  Referral Diagnosis: Arthritis of knee  Referring provider: Brenda Godoy MD  Visit Diagnosis:     ICD-10-CM    1. Bilateral chronic knee pain  M25.561     M25.562     G89.29    2. Weakness of both lower extremities  R29.898    3. Decreased range of motion (ROM) of right knee  M25.661    4. Balance problems  R26.89        Assessment:   Impairments in  pain, posture, ROM, strength, gait/locomotion and balance  Patient's signs and symptoms are consistent with Knee OA and secondary balance problems and associated weakness.  Patient responded well to instructed exercises.  Prognosis to achieve goals is  good   Pt. is appropriate for skilled PT intervention as outlined in the Plan of Care (POC).  Pt stats the exercises are helpful but has only performed them 3x since last follow-up 2 weeks ago.   Increased time needed today during session due to memory deficits.      Goal Status: progressing  Pt. will demonstrate/verbalize independence in self-management of condition in : 6 weeks  Pt. will be independent with home exercise program in : 6 weeks  Patient will ascend / descend: stairs;with railing;with recipricol gait;with less difficulty;in 6 weeks  Patient will transfer: sit/stand;for in/out of chair;with less difficulty;in 6 weeks    Patient will increase : LEFS score;by _ points;for improved quality of function;for improved quality of life;in 6 weeks;Comment  by ___ points: 9  Comment: Initial Score 57      Plan / Patient Education:   Ask pt about compliance with HEP and if she make her exercise cards.   Advised pt to perform ex's every other day.  Exercises increased to 15 reps x2 sets to increase difficulty and ease of memory.  Increase difficulty of ex's as able to fully fatigue muscles.     Step overs/ side step overs, tandem walking and mini squat     Subjective:   Pain Rating:  0/10  Has trouble getting up/off floor  Pt does have stairs at home.   Goals: avoid knee surgery  Denies falls  Pt has not been compliant with HEP. Has only done ex's 3x since last appointment     Objective:   Knee ROM                                 Date:  11/03/20     AROM in degrees  Right   Left  Right   Left  Right   Left       Knee Flexion  (130 )   130   130                   Knee Extension  (0 )   15   0                 Sit to stand 30 seconds 8x     Reviewed HEP cues needed    Exercises:  Exercise #1: Sit to stand 8-10x without UE assist - fatiguing no knee pain  Comment #1: Standing hip abduction 15x2 -H - not performed today  Exercise #2: Standing forward SLR, 15x2 each-H - not performed today  Comment #2: Glut set with bridge, 10-15 reps hold 5 seconds - H  Exercise #3: Issued Home Personal exercise log to record walking and exercise sessions.  Comment #3: heel raises 15x  Exercise #4: stair climbing  Comment #4: seated hip ABD with GTB 15x  Exercise #5: mini squats 5x  Comment #5: tandem stance 30 sec johanna  Exercise #6: nustep 5 mins level3  Comment #6: transfer on/off floor  Exercise #7: step overs 10x johanna ( 1/2 foam roll)  side step overs 10x johanna  Comment #7: standing knee flex 15x2 johanna  Progressed HEP today.     Pt brought in all her HEP from current and past PT.  She would like to make ex's cards at home.  Organization of exercises performed today for ease of performance and memory at home.     Treatment Today   12/15/2020    TREATMENT MINUTES COMMENTS   Evaluation     Self-care/ Home management     Manual therapy     Neuromuscular Re-education     Therapeutic Activity     Therapeutic Exercises 45 See above    Gait training     Modality__________________                Total 45    Blank areas are intentional and mean the treatment did not include these items.       Mimi Jay PT  12/15/2020

## 2021-06-13 NOTE — PROGRESS NOTES
[facility] Daily Progress     Patient Name: Ellen Felipe  Date: 2020  Visit #: 220  PTA visit #:  1  Referral Diagnosis: Arthritis of knee  Referring provider: Brenda Godoy MD  Visit Diagnosis:     ICD-10-CM    1. Bilateral chronic knee pain  M25.561     M25.562     G89.29    2. Weakness of both lower extremities  R29.898    3. Balance problems  R26.89    4. Decreased range of motion (ROM) of right knee  M25.661          Assessment:   Impairments in  pain, posture, ROM, strength, gait/locomotion and balance  Patient's signs and symptoms are consistent with Knee OA and secondary balance problems and associated weakness.  Patient responded well to instructed exercises.  Prognosis to achieve goals is  good   Pt. is appropriate for skilled PT intervention as outlined in the Plan of Care (POC).     Goal Status:Ongoing  Pt. will demonstrate/verbalize independence in self-management of condition in : 6 weeks  Pt. will be independent with home exercise program in : 6 weeks  Patient will ascend / descend: stairs;with railing;with recipricol gait;with less difficulty;in 6 weeks  Patient will transfer: sit/stand;for in/out of chair;with less difficulty;in 6 weeks    Patient will increase : LEFS score;by _ points;for improved quality of function;for improved quality of life;in 6 weeks;Comment  by ___ points: 9  Comment: Initial Score 57      Plan / Patient Education:     Continue POC  Plan for next visit: see 1x/week for 5 more visits then reassess progress/needs.  May continue up to 3 more to complete exercise instruction for home program.  review bilateral toe raises, seated clamshell with theraband,  Add  tandem walking and mini squat     Subjective:     Pain Ratin/10  Denies falls  Exercises every other day      Objective:   Pt arrived 10 mins late  Reviewed HEP cues needed  Exercises:  Exercise #1: Sit to stand 4x without UE assist but stressful to knees and fatiguing  Comment  "#1: Standing hip abduction, 10x 2\" each each-H  Exercise #2: Standing forward SLR, 10x 2\" each-H  Comment #2: Glut set with bridge, 10x-H  Exercise #3: Issued Home Personal exercise log to record walking and exercise sessions.  Comment #3: heel raises 10x  Exercise #4: stair climbing  Comment #4: seated hip ABD with OTB 10x      Treatment Today   11/17/2020    TREATMENT MINUTES COMMENTS   Evaluation     Self-care/ Home management     Manual therapy     Neuromuscular Re-education     Therapeutic Activity     Therapeutic Exercises 23 Added SLR  Heel raises  Seated hip ABD with OTB   Gait training     Modality__________________                Total 23    Blank areas are intentional and mean the treatment did not include these items.       Ernestine Dolan PTA  11/17/2020    "

## 2021-06-13 NOTE — PROGRESS NOTES
[facility] Follow Up Note    Ellen Felipe   77 y.o. female    Date of Visit: 9/28/2017    Chief Complaint   Patient presents with     Follow-up     BP.      Subjective  Ellen comes in today to follow-up her blood pressure.  We started her on some Toprol after elevations of her blood pressure on a consistent basis.  She is tolerating the medication fairly well.  Her blood pressure still slightly elevated when she takes it at home.  She is going to be flying to [place] in a couple weeks and is nervous she is not going to be able to sleep on the plane.  She had a knee injection a couple of days ago due to osteoarthritis and it still somewhat sore and she wonders what else she can use for this.  They sent a prescription for Celebrex for her but she has not picked it up yet.    ROS A comprehensive review of systems was performed and was otherwise negative    Social History:   Social History     Social History Narrative    She is .  They do not have children.  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       Medications were reconciled.  Allergies, social and family history, and the problem list were all reviewed and updated.    Exam  General Appearance: Pleasant and alert  Vitals:    09/28/17 0746   BP: 138/84   Patient Site: Left Arm   Patient Position: Sitting   Cuff Size: Adult Regular   Pulse: 63   Weight: 155 lb 6.4 oz (70.5 kg)      Body mass index is 23.98 kg/(m^2).  Wt Readings from Last 3 Encounters:   09/28/17 155 lb 6.4 oz (70.5 kg)   08/11/17 150 lb (68 kg)   03/20/17 151 lb (68.5 kg)     HEENT: Sclera are clear.   Lungs: Normal respirations  Abdomen: Soft and nondistended  Extremities: No edema  Skin: No rashes  Neuro: Moves all extremities and has facial symmetry  Gait: Ambulates with a normal gait    Assessment/Plan  1. Essential hypertension with goal blood pressure less than 140/90  Blood pressure is stable.  She wants to  avoid further medications.  If her blood pressure remains higher than we would like, could add hydrochlorothiazide.    2. OA (osteoarthritis) of knee  Recommended some Lidoderm patches and analgesic balm.  She declines diclofenac gel.    3. Insomnia  She was given some lorazepam for anxiety and sleep that she could use as needed on the plane along with melatonin which I recommended.          Lupe Quintero MD  9/28/2017    Much or all of the text in this note was generated through the use of Dragon Dictate voice-to-text software. Errors in spelling or words which seem out of context are unintentional. Sound alike errors, in particular, may have escaped editing.

## 2021-06-14 NOTE — PROGRESS NOTES
[facility] Daily Progress     Patient Name: Ellen Felipe  Date: 2020  Visit #: (additional 4 sessions requested)   PTA visit #:  3  Referral Diagnosis: Arthritis of knee  Referring provider: Brenda Godoy MD  Visit Diagnosis:     ICD-10-CM    1. Bilateral chronic knee pain  M25.561     M25.562     G89.29    2. Weakness of both lower extremities  R29.898    3. Decreased range of motion (ROM) of right knee  M25.661    4. Balance problems  R26.89        Assessment:   From Evaluation: Impairments in  pain, posture, ROM, strength, gait/locomotion and balance  Patient's signs and symptoms are consistent with Knee OA and secondary balance problems and associated weakness.  Patient responded well to instructed exercises.  Prognosis to achieve goals is  good   Pt. is appropriate for skilled PT intervention as outlined in the Plan of Care (POC).    Pt stats the exercises are helpful but has only performed them every other day.      Goal Status: progressing  Pt. will demonstrate/verbalize independence in self-management of condition in : 6 weeks  Pt. will be independent with home exercise program in : 6 weeks  Patient will ascend / descend: stairs;with railing;with recipricol gait;with less difficulty;in 6 weeks  Patient will transfer: sit/stand;for in/out of chair;with less difficulty;in 6 weeks    Patient will increase : LEFS score;by _ points;for improved quality of function;for improved quality of life;in 6 weeks;Comment  by ___ points: 9  Comment: Initial Score 57      Plan / Patient Education:   Ask pt about compliance with HEP.   Advised pt to perform ex's every other day.  Exercises increased to 15 reps x2 sets to increase difficulty and ease of memory.  Increase difficulty of ex's as able to fully fatigue muscles.     Step overs/ side step overs, tandem walking and mini squat     Subjective:   Pain Ratin/10  Has trouble getting up/off floor  Goals: avoid knee surgery  Denies  falls    Objective:   Exercises:  Exercise #1: Sit to stand 8-10x without UE assist - fatiguing no knee pain  Comment #1: Standing hip abduction 15x2 -H - not performed today  Exercise #2: Standing forward SLR, 15x2 each-H - not performed today  Comment #2: Glut set with bridge, 10-15 reps hold 5 seconds - H  Exercise #3: Issued Home Personal exercise log to record walking and exercise sessions.  Comment #3: heel raises 15x  Exercise #4: stair climbing  Comment #4: seated hip ABD with GTB 15x  Exercise #5: mini squats 5x  Comment #5: tandem stance 30 sec johanna  Exercise #6: nustep 5 mins level3  Comment #6: transfer on/off floor  Exercise #7: step overs 10x johanna ( 1/2 foam roll)  side step overs 10x johanna  Comment #7: standing knee flex 15x2 johanna  Exercise #8: semi-tandem stance 30 sec johanna 2x  Comment #8: side stepping 8 ftx 4, backwards walking 8 ftx 4,  Exercise #9: semi tandem stance 30 sec 2x johanna EO  Reviewed all standing ex's today that she has placed on cards: sit to stand, heel raises, hip abd, and hip flexion     Treatment Today   12/29/2020    TREATMENT MINUTES COMMENTS   Evaluation     Self-care/ Home management     Manual therapy     Neuromuscular Re-education     Therapeutic Activity     Therapeutic Exercises 15 See above    Gait training     Modality__________________                Total 15 15 min late    Blank areas are intentional and mean the treatment did not include these items.       Mimi Jay PT  12/29/2020

## 2021-06-14 NOTE — PROGRESS NOTES
[facility] Daily Progress     Patient Name: Ellen Felipe  Date: 12/24/2020  Visit #:5/8 12/28/20  PTA visit #:  3  Referral Diagnosis: Arthritis of knee  Referring provider: Brenda Godoy MD  Visit Diagnosis:     ICD-10-CM    1. Bilateral chronic knee pain  M25.561     M25.562     G89.29    2. Weakness of both lower extremities  R29.898    3. Decreased range of motion (ROM) of right knee  M25.661    4. Balance problems  R26.89        Assessment:   Impairments in  pain, posture, ROM, strength, gait/locomotion and balance  Patient's signs and symptoms are consistent with Knee OA and secondary balance problems and associated weakness.  Patient responded well to instructed exercises.  Prognosis to achieve goals is  good   Pt. is appropriate for skilled PT intervention as outlined in the Plan of Care (POC).  Pt stats the exercises are helpful but has only performed them 3x since last follow-up 2 weeks ago.   Increased time needed today during session due to memory deficits.      Goal Status: progressing  Pt. will demonstrate/verbalize independence in self-management of condition in : 6 weeks  Pt. will be independent with home exercise program in : 6 weeks  Patient will ascend / descend: stairs;with railing;with recipricol gait;with less difficulty;in 6 weeks  Patient will transfer: sit/stand;for in/out of chair;with less difficulty;in 6 weeks    Patient will increase : LEFS score;by _ points;for improved quality of function;for improved quality of life;in 6 weeks;Comment  by ___ points: 9  Comment: Initial Score 57      Plan / Patient Education:   Ask pt about compliance with HEP and if she make her exercise cards.   Advised pt to perform ex's every other day.  Exercises increased to 15 reps x2 sets to increase difficulty and ease of memory.  Increase difficulty of ex's as able to fully fatigue muscles.     Step overs/ side step overs, tandem walking and mini squat     Subjective:   Pain Rating:  0/10  Has trouble getting up/off floor  Goals: avoid knee surgery  Denies falls  Pt has been doing daily     Objective:   Knee ROM                                 Date:  11/03/20   Sit to stand 30 seconds 8x     Reviewed HEP cues needed  Progressed HEP today.   Exercise #1: Sit to stand 8-10x without UE assist - fatiguing no knee pain  Comment #1: Standing hip abduction 15x2 -H - not performed today  Exercise #2: Standing forward SLR, 15x2 each-H - not performed today  Comment #2: Glut set with bridge, 10-15 reps hold 5 seconds - H  Exercise #3: Issued Home Personal exercise log to record walking and exercise sessions.  Comment #3: heel raises 15x  Exercise #4: stair climbing  Comment #4: seated hip ABD with GTB 15x  Exercise #5: mini squats 5x  Comment #5: tandem stance 30 sec johanna  Exercise #6: nustep 5 mins level3  Comment #6: transfer on/off floor  Exercise #7: step overs 10x johanna ( 1/2 foam roll)  side step overs 10x johanna  Comment #7: standing knee flex 15x2 johanna  Exercise #8: semi-tandem stance 30 sec johanna 2x  Comment #8: side stepping 8 ftx 4, backwards walking 8 ftx 4,      Pt brought in all her HEP from current and past PT.  She would like to make ex's cards at home.  Organization of exercises performed today for ease of performance and memory at home.     Treatment Today   12/24/2020    TREATMENT MINUTES COMMENTS   Evaluation     Self-care/ Home management     Manual therapy     Neuromuscular Re-education 30 Balance exercises per flowsheet   Therapeutic Activity     Therapeutic Exercises 15 See above    Gait training     Modality__________________                Total 45    Blank areas are intentional and mean the treatment did not include these items.       Ernestine Dolan PTA  12/24/2020

## 2021-06-14 NOTE — PROGRESS NOTES
[facility] Daily Progress     Patient Name: Ellen Felipe  Date: 2021  Visit #: (additional 4 sessions requested)   PTA visit #:  4  Referral Diagnosis: Arthritis of knee  Referring provider: Brenda Godoy MD  Visit Diagnosis:     ICD-10-CM    1. Weakness of both lower extremities  R29.898    2. Bilateral chronic knee pain  M25.561     M25.562     G89.29    3. Decreased range of motion (ROM) of right knee  M25.661    4. Balance problems  R26.89        Assessment:   From Evaluation: Impairments in  pain, posture, ROM, strength, gait/locomotion and balance  Patient's signs and symptoms are consistent with Knee OA and secondary balance problems and associated weakness.  Patient responded well to instructed exercises.  Prognosis to achieve goals is  good   Pt. is appropriate for skilled PT intervention as outlined in the Plan of Care (POC).    Pt stats the exercises are helpful but has only performed them every other day.      Goal Status: progressing  Pt. will demonstrate/verbalize independence in self-management of condition in : 6 weeks met  Pt. will be independent with home exercise program in : 6 weeks progressing towards  Patient will ascend / descend: stairs;with railing;with recipricol gait;with less difficulty;in 6 weeks met  Patient will transfer: sit/stand;for in/out of chair;with less difficulty;in 6 weeks improved    Patient will increase : LEFS score;by _ points;for improved quality of function;for improved quality of life;in 6 weeks;Comment  by ___ points: 9  Comment: Initial Score 57      Plan / Patient Education:   Ask pt about compliance with HEP.   Advised pt to perform ex's every other day.  Exercises increased to 15 reps x2 sets to increase difficulty and ease of memory.  Increase difficulty of ex's as able to fully fatigue muscles.   Continue balance activites     Subjective:   Pain Ratin/10  Has trouble getting up/off floor  Goals: avoid knee surgery  Denies  falls  Exercises 1x/day    Objective:   Pt 7 mins late  Able to climb stairs reciprocally using railing  Reviewed all standing ex's today that she has placed on cards: sit to stand, heel raises, hip abd, and hip flexion  Exercises:  Exercise #1: Sit to stand 8-10x without UE assist - fatiguing no knee pain  Comment #1: Standing hip abduction 15x2 -H - not performed today  Exercise #2: Standing forward SLR, 15x2 each-H - not performed today  Comment #2: Glut set with bridge, 10-15 reps hold 5 seconds - H  Exercise #3: Issued Home Personal exercise log to record walking and exercise sessions.  Comment #3: heel raises 15x  Exercise #4: stair climbing  Comment #4: seated hip ABD with GTB 15x  Exercise #5: mini squats 5x  Comment #5: tandem stance 30 sec johanna  Exercise #6: nustep 5 mins level3  Comment #6: transfer on/off floor  Exercise #7: step overs 10x johanna ( 1/2 foam roll)  side step overs 10x johanna  Comment #7: standing knee flex 15x2 johanna  Exercise #8: semi-tandem stance 30 sec johanna 2x  Comment #8: side stepping 8 ftx 4, backwards walking 8 ftx 4,  Exercise #9: semi tandem stance 30 sec 2x johanna EO  Comment #9: stair climbing reciprocally flight up/down goal met         Treatment Today   1/5/2021    TREATMENT MINUTES COMMENTS   Evaluation     Self-care/ Home management     Manual therapy     Neuromuscular Re-education 10    Therapeutic Activity     Therapeutic Exercises 15 See above    Gait training     Modality__________________                Total 25    Blank areas are intentional and mean the treatment did not include these items.       Ernestine Dolan PTA  1/5/2021

## 2021-06-14 NOTE — PROGRESS NOTES
[facility] Daily Progress     Patient Name: Ellen Felipe  Date: 2021  Visit #:10/12 (additional 4 sessions requested)   PTA visit #:  6  Referral Diagnosis: Arthritis of knee  Referring provider: Brenda Godoy MD  Visit Diagnosis:     ICD-10-CM    1. Weakness of both lower extremities  R29.898    2. Bilateral chronic knee pain  M25.561     M25.562     G89.29    3. Decreased range of motion (ROM) of right knee  M25.661    4. Balance problems  R26.89        Assessment:   Pt is independent with HEP  Reports improvement in transfers decreased pain  Able to ambulate stairs reciprocally with use of railing  Goals  partially met  Pt staets the exercises are helpful but has only performed them every other day.      Goal Status: partially met  Pt. will demonstrate/verbalize independence in self-management of condition in : 6 weeks met  Pt. will be independent with home exercise program in : 6 weeks progressing towards  Patient will ascend / descend: stairs;with railing;with recipricol gait;with less difficulty;in 6 weeks met  Patient will transfer: sit/stand;for in/out of chair;with less difficulty;in 6 weeks improved    Patient will increase : LEFS score;by _ points;for improved quality of function;for improved quality of life;in 6 weeks;Comment  by ___ points: 9  Comment: Initial Score 57  LEFS score at discharge 59    Plan / Patient Education:   Follow up MD  No further PT at this time   Subjective:   Pain Ratin/10 intermittent pain  Walked about 40 mins yesterday  Denies falls  Exercises 1x/day  Pt reports she would like to continue on her own now    Objective:   Pt 7 mins late  Able to climb stairs reciprocally using railing  Reviewed all standing ex's today that she has placed on cards: sit to stand, heel raises, hip abd, and hip flexion  Reviewed HEP  Exercises:  Exercise #1: Sit to stand 8-10x without UE assist - fatiguing no knee pain  Comment #1: Standing hip abduction 15x2 -H - not  performed today  Exercise #2: Standing forward SLR, 15x2 each-H - not performed today  Comment #2: Glut set with bridge, 10-15 reps hold 5 seconds - H  Exercise #3: Issued Home Personal exercise log to record walking and exercise sessions.  Comment #3: heel raises 15x  Exercise #4: stair climbing  Comment #4: seated hip ABD with GTB 15x  Exercise #5: mini squats 5x  Comment #5: tandem stance 30 sec johanna  Exercise #6: nustep 5 mins level3  Comment #6: transfer on/off floor  Exercise #7: step overs 10x johanna ( 1/2 foam roll)  side step overs 10x johanna  Comment #7: standing knee flex 15x2 johanna  Exercise #8: semi-tandem stance 30 sec johanna 2x  Comment #8: side stepping 8 ftx 4, backwards walking 8 ftx 4,  Exercise #9: semi tandem stance 30 sec 2x johanna EO  Comment #9: stair climbing reciprocally flight up/down goal met  goals partially met       Treatment Today   1/12/2021    TREATMENT MINUTES COMMENTS   Evaluation     Self-care/ Home management     Manual therapy     Neuromuscular Re-education     Therapeutic Activity     Therapeutic Exercises 23 See above    Gait training     Modality__________________                Total 23    Blank areas are intentional and mean the treatment did not include these items.       Ernestine Dolan PTA  1/12/2021

## 2021-06-15 PROBLEM — K52.831 COLLAGENOUS COLITIS: Chronic | Status: ACTIVE | Noted: 2017-05-02

## 2021-06-15 NOTE — PROGRESS NOTES
[facility] Discharge Summary  Patient Name: Ellen Felipe  Date: 2/26/2021  Referral Diagnosis: Arthritis of knee  Referring provider: Brenda Godoy MD  Visit Diagnosis:   1. Weakness of both lower extremities     2. Bilateral chronic knee pain     3. Decreased range of motion (ROM) of right knee     4. Balance problems         Goals:  Goal Status: partially met  Pt. will demonstrate/verbalize independence in self-management of condition in : 6 weeks met  Pt. will be independent with home exercise program in : 6 weeks progressing towards  Patient will ascend / descend: stairs;with railing;with recipricol gait;with less difficulty;in 6 weeks met  Patient will transfer: sit/stand;for in/out of chair;with less difficulty;in 6 weeks improved     Patient will increase : LEFS score;by _ points;for improved quality of function;for improved quality of life;in 6 weeks;Comment  by ___ points: 9  Comment: Initial Score 57  LEFS score at discharge 59    Patient was seen for 9 visits from 11/3/20 to 1/12/21 with 0 missed appointments.  Pt was not longer progressing - has full HEP - less pain - increased function.     Therapy will be discontinued at this time.  The patient will need a new referral to resume.    Thank you for your referral.  Mimi Jay  2/26/2021  8:24 AM

## 2021-06-15 NOTE — PROGRESS NOTES
Assessment and Plan:   Overall, Ellen is doing well.    1. Hypercholesterolemia  Check labs today.  - Hepatic Profile  - Lipid [place]    2. Hypertension  Stable on medication.  - Basic Metabolic Panel  - HM2(CBC w/o Differential)  - Thyroid Stimulating Hormone (TSH)  - Urinalysis-UC if Indicated    3. Vitamin D deficiency  - Vitamin D, Total (25-Hydroxy)    4. Aortic stenosis, mild  Due for an echocardiogram.  - Echo Complete; Future    5. Menopause  - DXA Bone Density Scan; Future    6. Encounter for general adult medical examination with abnormal findings       The patient's current medical problems were reviewed.    The following health maintenance schedule was reviewed with the patient and provided in printed form in the after visit summary:   Health Maintenance   Topic Date Due     DXA SCAN  11/11/2017     DEPRESSION FOLLOW UP  03/28/2018     FALL RISK ASSESSMENT  08/11/2018     ADVANCE DIRECTIVES DISCUSSED WITH PATIENT  11/03/2020     TD 18+ HE  06/28/2023     PNEUMOCOCCAL POLYSACCHARIDE VACCINE AGE 65 AND OVER  Completed     INFLUENZA VACCINE RULE BASED  Completed     PNEUMOCOCCAL CONJUGATE VACCINE FOR ADULTS (PCV13 OR PREVNAR)  Completed     ZOSTER VACCINE  Completed        Subjective:   Chief Complaint: Ellen Felipe is an 77 y.o. female here for an Annual Wellness visit.   HPI: Ellen is doing fairly well.  They took a trip to [place] last fall.  She has not gotten ill over the winter.  Her and her  are going to some marriage counseling.    Review of Systems:    Please see above.  The rest of the review of systems are negative for all systems.    Patient Care Team:  Lupe Quintero MD as PCP - General     Patient Active Problem List   Diagnosis     Benign Polyps Of The Large Intestine     Allergic Rhinitis     Diverticulosis     Osteopenia     Herpes Simplex     Hypercholesterolemia     Irritable Bowel Syndrome     Major Depression, Single Episode In Remission     Hypertension      OCD (obsessive compulsive disorder)     Aortic stenosis, mild     Collagenous colitis     Generalized anxiety disorder     Past Medical History:   Diagnosis Date     Hypertension     Created by Conversion  Replacement Utility updated for latest IMO load     OCD (obsessive compulsive disorder) 4/10/2015      Past Surgical History:   Procedure Laterality Date     UT REMOVE TONSILS/ADENOIDS,<13 Y/O      Description: Tonsillectomy With Adenoidectomy;  Recorded: 2010;      Family History   Problem Relation Age of Onset     Heart failure Mother       age 96     Heart failure Father       age 85     Diabetes type II Sister      Diabetes type II Brother      Diabetes type II Brother       Social History     Social History     Marital status:      Spouse name: N/A     Number of children: N/A     Years of education: N/A     Occupational History     Not on file.     Social History Main Topics     Smoking status: Never Smoker     Smokeless tobacco: Never Used     Alcohol use 0.5 oz/week     1 Standard drinks or equivalent per week     Drug use: No     Sexual activity: Not on file     Other Topics Concern     Not on file     Social History Narrative    She is .  They do not have children.  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      Current Outpatient Prescriptions   Medication Sig Dispense Refill     ascorbic acid (ASCORBIC ACID WITH MINA HIPS) 500 MG tablet Take 500 mg by mouth daily.       b complex vitamins (B-COMPLEX) tablet Take 1 tablet by mouth daily.       calcium carbonate-vitamin D3 600 mg calcium- 200 unit cap Take 1 tablet by mouth daily.       cholecalciferol, vitamin D3, (VITAMIN D3) 2,000 unit Tab Take 1 tablet by mouth daily.       erythromycin with ethanol (EMGEL) 2 % gel Apply topically daily.       FLUOCINOLONE ACETONIDE (FLUOCINOLONE TOP) Apply topically.       fluocinonide (LIDEX) 0.05 % cream        fluvoxaMINE (LUVOX)  "50 MG tablet Take 100 mg by mouth bedtime.        LACTOBACILLUS ACIDOPHILUS (PROBIOTIC ORAL) Take 1 capsule by mouth daily.       loperamide (IMODIUM) 2 mg capsule Take 2 mg by mouth daily.       LORazepam (ATIVAN) 0.5 MG tablet Take 1-2 tablets (0.5-1 mg total) by mouth every 6 (six) hours as needed for anxiety. Sleep or anxiety while flying 20 tablet 0     losartan (COZAAR) 100 MG tablet TAKE 1 TABLET BY MOUTH DAILY 90 tablet 2     lysine (L-LYSINE) 500 mg cap Take 2 capsules by mouth daily.        metoprolol succinate (TOPROL-XL) 50 MG 24 hr tablet Take 1 tablet (50 mg total) by mouth daily. 90 tablet prn     MULTIVITAMIN ORAL Take 1 tablet by mouth daily.       omega-3 fatty acids 1,000 mg cap Take 1 capsule by mouth daily.       PARoxetine (PAXIL) 10 MG tablet Take 1 tablet every AM and 1 tab every PM= total 30mg       No current facility-administered medications for this visit.       Objective:   Vital Signs:   Visit Vitals     /62 (Patient Site: Left Arm, Patient Position: Sitting, Cuff Size: Adult Regular)     Pulse 62     Ht 5' 7.25\" (1.708 m)     Wt 153 lb 7 oz (69.6 kg)     SpO2 98%     Breastfeeding No     BMI 23.85 kg/m2        VisionScreening:  No exam data present     PHYSICAL EXAM  Wt Readings from Last 3 Encounters:   02/07/18 153 lb 7 oz (69.6 kg)   09/28/17 155 lb 6.4 oz (70.5 kg)   08/11/17 150 lb (68 kg)     General Appearance: Pleasant and alert  HEENT: Sclera are clear, tympanic membranes clear  Lungs: Normal respirations, clear to auscultation  Breasts: Normal-appearing breasts and nipples with no axillary adenopathy   Cardiac: Regular rate and rhythm with a 1 out of 6 systolic murmur  Abdomen: Soft and nondistended  Extremities: No edema  Skin: No rashes  Neuro: Moves all extremities and has facial symmetry  Gait: Ambulates with a normal gait    Personalized prevention plan: Lifestyle interventions to promote self-management and wellness were discussed including good nutrition, ongoing " physical activity, falls prevention and continuing with screening tests as recommended including density scan and those listed in the health maintenance plan listed above.    Much or all of the text in this note was generated through the use of Dragon Dictate voice-to-text software. Errors in spelling or words which seem out of context are unintentional. Sound alike errors, in particular, may have escaped editing.      Assessment Results 2/7/2018   Activities of Daily Living No help needed   Instrumental Activities of Daily Living No help needed   Mini Cog Total Score 5   Some recent data might be hidden     A Mini-Cog score of 0-2 suggests the possibility of dementia, score of 3-5 suggests no dementia    Identified Health Risks:     She is at risk for lack of exercise and has been provided with information to increase physical activity for the benefit of her well-being.  The patient was provided with written information regarding signs of hearing loss.  The patient's HARDY-7 score is consistent with probably anxiety disorder.  She was provided with patient information regarding anxiety and was advised to set up a follow up appointment in her counselor in several weeks to further address this issue.  She is at risk for falling and has been provided with information to reduce the risk of falling at home.  She is at risk for falling and has been provided with information about Derick Chi.  Patient's advanced directive was discussed and I am comfortable with the patient's wishes.

## 2021-06-16 PROBLEM — Z87.898 HISTORY OF BACTEREMIA: Status: ACTIVE | Noted: 2019-06-12

## 2021-06-16 PROBLEM — E87.1 HYPONATREMIA: Status: ACTIVE | Noted: 2019-06-06

## 2021-06-16 PROBLEM — F41.1 GENERALIZED ANXIETY DISORDER: Chronic | Status: ACTIVE | Noted: 2017-09-28

## 2021-06-16 PROBLEM — N30.00 ACUTE CYSTITIS WITHOUT HEMATURIA: Status: ACTIVE | Noted: 2019-06-06

## 2021-06-16 PROBLEM — E87.6 HYPOKALEMIA: Status: ACTIVE | Noted: 2019-06-06

## 2021-06-16 NOTE — TELEPHONE ENCOUNTER
Refill Approved    Rx renewed per Medication Renewal Policy. Medication was last renewed on 4/13/20.    Guille Eaton, [facility] Connection Triage/Med Refill 3/31/2021     Requested Prescriptions   Pending Prescriptions Disp Refills     metoprolol succinate (TOPROL-XL) 50 MG 24 hr tablet [Pharmacy Med Name: METOPROLOL SUCC ER 50 MG ** 50 Tablet] 90 tablet 3     Sig: TAKE 1 TABLET (50 MG TOTAL) BY MOUTH DAILY.       Beta-Blockers Refill Protocol Passed - 3/29/2021  6:14 PM        Passed - PCP or prescribing provider visit in past 12 months or next 3 months     Last office visit with prescriber/PCP: Visit date not found OR same dept: Visit date not found OR same specialty: 12/4/2019 Brenda Godoy MD  Last physical: Visit date not found Last MTM visit: Visit date not found   Next visit within 3 mo: Visit date not found  Next physical within 3 mo: Visit date not found  Prescriber OR PCP: Rodrigo Fracnois MD  Last diagnosis associated with med order: 1. Essential hypertension  - metoprolol succinate (TOPROL-XL) 50 MG 24 hr tablet [Pharmacy Med Name: METOPROLOL SUCC ER 50 MG ** 50 Tablet]; Take 1 tablet (50 mg total) by mouth daily.  Dispense: 90 tablet; Refill: 3    If protocol passes may refill for 12 months if within 3 months of last provider visit (or a total of 15 months).             Passed - Blood pressure filed in past 12 months     BP Readings from Last 1 Encounters:   10/08/20 104/62

## 2021-06-17 NOTE — TELEPHONE ENCOUNTER
RN cannot approve Refill Request    RN can NOT refill this medication medication not on med list and Med was discontinued. Last office visit: 12/4/2019 Brenda Godoy MD Last Physical: 10/8/2020 Last MTM visit: Visit date not found Last visit same specialty: 12/4/2019 Brenda Godoy MD.  Next visit within 3 mo: Visit date not found  Next physical within 3 mo: Visit date not found      Connie Penaloza, [facility] Connection Triage/Med Refill 5/4/2021    Requested Prescriptions   Pending Prescriptions Disp Refills     spironolactone (ALDACTONE) 50 MG tablet [Pharmacy Med Name: SPIRONOLACTONE 50 MG TAB^^ 50 Tablet] 90 tablet 3     Sig: TAKE 1 TABLET (50 MG TOTAL) BY MOUTH DAILY.       Diuretics/Combination Diuretics Refill Protocol  Passed - 5/3/2021  3:08 PM        Passed - Visit with PCP or prescribing provider visit in past 12 months     Last office visit with prescriber/PCP: 12/4/2019 Brenda Godoy MD OR same dept: Visit date not found OR same specialty: 12/4/2019 Brenda Godoy MD  Last physical: 10/8/2020 Last MTM visit: Visit date not found   Next visit within 3 mo: Visit date not found  Next physical within 3 mo: Visit date not found  Prescriber OR PCP: Brenda Godoy MD  Last diagnosis associated with med order: 1. Essential hypertension  - spironolactone (ALDACTONE) 50 MG tablet [Pharmacy Med Name: SPIRONOLACTONE 50 MG TAB^^ 50 Tablet]; TAKE 1 TABLET (50 MG TOTAL) BY MOUTH DAILY.  Dispense: 90 tablet; Refill: 3    2. Edema, unspecified type  - spironolactone (ALDACTONE) 50 MG tablet [Pharmacy Med Name: SPIRONOLACTONE 50 MG TAB^^ 50 Tablet]; TAKE 1 TABLET (50 MG TOTAL) BY MOUTH DAILY.  Dispense: 90 tablet; Refill: 3    If protocol passes may refill for 12 months if within 3 months of last provider visit (or a total of 15 months).             Passed - Serum Potassium in past 12 months      Lab Results   Component Value Date    Potassium 4.3 10/08/2020             Passed - Serum Sodium in past  12 months      Lab Results   Component Value Date    Sodium 138 10/08/2020             Passed - Blood pressure on file in past 12 months     BP Readings from Last 1 Encounters:   10/08/20 104/62             Passed - Serum Creatinine in past 12 months      Creatinine   Date Value Ref Range Status   10/08/2020 1.59 (H) 0.60 - 1.10 mg/dL Final

## 2021-06-18 NOTE — PATIENT INSTRUCTIONS - HE
Patient Instructions by Mimi Jay PT at 12/15/2020 12:00 PM     Author: Mimi Jay PT Service: -- Author Type: Physical Therapist    Filed: 12/15/2020 12:32 PM Encounter Date: 12/15/2020 Status: Signed    : Mimi Jay PT (Physical Therapist)        SIT TO STAND - 8-10x     While making sure you bend at the hip, SLOWLY sit down    Your chest may come forward over your toes, but your spine should stay in neutral as shown.

## 2021-06-18 NOTE — PATIENT INSTRUCTIONS - HE
"Patient Instructions by Dolly Arteaga PT at 11/3/2020 11:30 AM     Author: Dolly Arteaga PT Service: -- Author Type: Physical Therapist    Filed: 11/3/2020  4:18 PM Encounter Date: 11/3/2020 Status: Addendum    : Dolly Arteaga PT (Physical Therapist)    Related Notes: Original Note by Dolly Arteaga PT (Physical Therapist) filed at 11/3/2020  1:07 PM               Exercises:  Exercise #1: Sit to stand 4x without UE assist but stressful to knees and fatiguing  Comment #1: Standing hip abduction, 10x 2\" each each-H  Exercise #2: Standing forward SLR, 10x 2\" each-H  Comment #2: Glut set with bridge, 10x-H  Exercise #3: Issued Home Personal exercise log to record walking and exercise sessions.           "

## 2021-06-18 NOTE — PATIENT INSTRUCTIONS - HE
"Patient Instructions by Ernestine Dolan PTA at 11/24/2020 10:30 AM     Author: Ernestine Dolan PTA Service: -- Author Type: Physical Therapist Assistant    Filed: 11/24/2020 11:02 AM Encounter Date: 11/24/2020 Status: Signed    : Ernetsine Dolan PTA (Physical Therapist Assistant)        BRIDGING    While lying on your back, tighten your lower abdominals, squeeze your buttocks and then raise your buttocks off the floor/bed as creating a \"Bridge\" with your body.      SQUATS - MINI SQUAT - TABLE    While standing with feet shoulder width apart and in front of a stable support for balance assist if needed, bend your knees and lower your body towards the floor. Your body weight should mostly be directed through the heels of your feet. Return to a standing position.     Knees should bend in line with the 2nd toe and not pass the front of the foot.          "

## 2021-06-18 NOTE — PATIENT INSTRUCTIONS - HE
Patient Instructions by Ernestine Dolan PTA at 11/17/2020 10:30 AM     Author: Ernestine Dolan PTA Service: -- Author Type: Physical Therapist Assistant    Filed: 11/17/2020 11:01 AM Encounter Date: 11/17/2020 Status: Addendum    : Ernestine Dolan PTA (Physical Therapist Assistant)    Related Notes: Original Note by Ernestine Dolan PTA (Physical Therapist Assistant) filed at 11/17/2020 10:54 AM           STANDING HEEL RAISES    While standing, raise up on your toes as you lift your heels off the ground.      STRAIGHT LEG RAISE - SLR    While lying or sitting, raise up your leg with a straight knee.  Keep the opposite knee bent with the foot planted to the ground.     .

## 2021-06-19 NOTE — LETTER
Letter by Darron Tang CNP at      Author: Darron Tang CNP Service: -- Author Type: --    Filed:  Encounter Date: 2019 Status: (Other)         Patient: Ellen Felipe   MR Number: [id number]   YOB: 1940   Date of Visit: 2019     [facility]    Name:   Ellen Felipe  : 1940  Facility:   [facility] [[id number]]   Room:   Code Status: FULL CODE -   Fac type:   SNF (Skilled Nursing Facility, TCU) -     CHIEF COMPLAINT / REASON FOR VISIT:  Chief Complaint   Patient presents with   ? Follow-up     TCU follow-up after hospitalization for weakness, UTI with gram-negative bacteremia, hyponatremia, and hypokalemia.      [facility] from 2019 until 06/10/2019 (UTI with gram-negative bacteremia and encephalopathy)      HPI: Ellen is a 78 y.o. female mild memory problems (patient-endorsed), generalized anxiety disorder, depression and OCD (on both paroxetine and fluvoxamine), hypertension, hyperlipidemia, aortic stenosis, history of collagenous colitis and IBS, osteopenia.      She was admitted to the hospital on 2019 for weakness and fever and was found to have hyponatremia, hypokalemia, and a urinary tract infection, and was started on antibiotics while her hydrochlorothiazide was stopped.  The urine culture grew E. coli, and a blood culture was also positive for the same.  Antibiotics were switched to ciprofloxacin with a 10-day course, and at the time of discharge, she had stabilized, and a follow-up blood culture was not providing any growth.  There was a mildly elevated WBC count; however, she was afebrile, her blood pressure was stable, she had no tachycardia, and she was feeling comfortable.  Hydrochlorothiazide was discontinued.  Recommendation called for labs to be checked here.      TCU ISSUES    Primary diagnosis: We will obtain a follow-up BMP and CBC with  "differential.    Depression/obsessive-compulsive disorder: It is immediately obvious that the patient suffers from anxiety.  She states, \"I struggle some with depression and anxiety.\"  Of particular note is that she is receiving 2 SSRIs, fluvoxamine for OCD (she calls it \"ruminations\") and paroxetine for depression and anxiety.  She is seen by both psychiatry and psychology.  She is also on a variety of herbals/vitamins.    Hypertension: On losartan (100 mg daily) and metoprolol succinate (50 mg daily).    ROS: She tells me she is feeling \"better? not better better,\" and \"more sure of my feet.\"  No headaches or chest pains, coughing or congestion, nausea or vomiting, dizziness or dyspnea, dysuria, constipation or diarrhea, difficulty chewing or swallowing, or any integumentary issues.    Past Medical History:   Diagnosis Date   ? Depression    ? Hyperlipidemia    ? Hypertension     Created by Conversion  Replacement Utility updated for latest IMO load   ? IBS (irritable bowel syndrome)    ? OCD (obsessive compulsive disorder) 4/10/2015   ? OCD (obsessive compulsive disorder)               Family History   Problem Relation Age of Onset   ? Heart failure Mother          age 96   ? Heart failure Father          age 85   ? Diabetes type II Sister    ? Diabetes type II Brother    ? Diabetes type II Brother      Social History     Socioeconomic History   ? Marital status:      Spouse name: Not on file   ? Number of children: Not on file   ? Years of education: Not on file   ? Highest education level: Not on file   Occupational History   ? Not on file   Social Needs   ? Financial resource strain: Not on file   ? Food insecurity:     Worry: Not on file     Inability: Not on file   ? Transportation needs:     Medical: Not on file     Non-medical: Not on file   Tobacco Use   ? Smoking status: Never Smoker   ? Smokeless tobacco: Never Used   Substance and Sexual Activity   ? Alcohol use: Yes     Alcohol/week: " 0.5 oz     Types: 1 Standard drinks or equivalent per week     Comment: 1 glass of red wine   ? Drug use: No   ? Sexual activity: Not on file   Lifestyle   ? Physical activity:     Days per week: Not on file     Minutes per session: Not on file   ? Stress: Not on file   Relationships   ? Social connections:     Talks on phone: Not on file     Gets together: Not on file     Attends Denominational service: Not on file     Active member of club or organization: Not on file     Attends meetings of clubs or organizations: Not on file     Relationship status: Not on file   ? Intimate partner violence:     Fear of current or ex partner: Not on file     Emotionally abused: Not on file     Physically abused: Not on file     Forced sexual activity: Not on file   Other Topics Concern   ? Not on file   Social History Narrative    She is .  They do not have children.  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       MEDICATIONS: Reviewed from the MAR, physician orders, and/or earlier progress notes.  Current Outpatient Medications   Medication Sig   ? acetaminophen (TYLENOL) 325 MG tablet Take 2 tablets (650 mg total) by mouth every 6 (six) hours as needed.   ? amLODIPine (NORVASC) 5 MG tablet Take 1 tablet (5 mg total) by mouth daily.   ? amoxicillin (AMOXIL) 500 MG capsule Take 2,000 mg by mouth see administration instructions. Take one hour prior to dental appointments as directed.   ? ascorbic acid (ASCORBIC ACID WITH MINA HIPS) 500 MG tablet Take 500 mg by mouth daily.   ? b complex vitamins (B-COMPLEX) tablet Take 1 tablet by mouth daily.   ? CALCIUM-VITAMIN D3 ORAL Take 1 tablet by mouth daily.   ? cholecalciferol, vitamin D3, (VITAMIN D3) 2,000 unit Tab Take 2,000 Units by mouth daily.          ? ciprofloxacin HCl (CIPRO) 500 MG tablet Take 1 tablet (500 mg total) by mouth 2 times a day at 6:00 am and 4:00 pm for 10 days.   ? docosahexanoic acid/epa (FISH OIL ORAL)  "Take 1 capsule by mouth daily.   ? fluvoxaMINE (LUVOX) 25 MG tablet 2 tabs in the morning and 3 tabs in the evening   ? LACTOBACILLUS ACIDOPHILUS (PROBIOTIC ORAL) Take 1 capsule by mouth daily.   ? loperamide (IMODIUM A-D) 2 mg tablet Take 2 mg by mouth daily.   ? losartan (COZAAR) 100 MG tablet TAKE 1 TABLET (100 MG TOTAL) BY MOUTH DAILY.   ? lysine (L-LYSINE) 500 mg cap Take 2 capsules by mouth daily.    ? metoprolol succinate (TOPROL-XL) 50 MG 24 hr tablet TAKE 1 TABLET (50 MG TOTAL) BY MOUTH DAILY.   ? MULTIVITAMIN ORAL Take 1 tablet by mouth daily.   ? PARoxetine (PAXIL) 10 MG tablet One tab in the morning and 2 tabs in the evening     ALLERGIES:   Allergies   Allergen Reactions   ? Sertraline Diarrhea     DIET: Regular, regular texture, thin liquids.    Vitals:    06/12/19 1222   BP: 129/68   Pulse: 79   Resp: 18   Temp: 98.1  F (36.7  C)   SpO2: 98%   Weight: 162 lb 12.8 oz (73.8 kg)   Height: 5' 8\" (1.727 m)     Body mass index is 24.75 kg/m .    EXAMINATION:   General: Pleasant, alert, and conversant -- though mildly anxious -- elderly female, sitting in a recliner, having breakfast.  Head: Normocephalic and atraumatic.   Eyes: PERRLA, sclerae clear.   ENT: Moist oral mucosa.  She has her own teeth.  No rhinorrhea or nasal discharge.  Hearing is unimpaired.  Cardiovascular: Regular rate and rhythm with a 2/6 BUFFY at the LSB.   Respiratory: Lungs clear to auscultation bilaterally.   Abdomen: Soft and nontender.   Musculoskeletal/Extremities: Age-related degenerative joint disease.  No peripheral edema.  Integument: No rashes, clinically significant lesions, or skin breakdown.   Cognitive/Psychiatric: Alert and oriented with an apparent anxiety component.    DIAGNOSTICS:   Results for orders placed or performed during the hospital encounter of 06/06/19   Basic Metabolic Panel   Result Value Ref Range    Sodium 135 (L) 136 - 145 mmol/L    Potassium 3.6 3.5 - 5.0 mmol/L    Chloride 103 98 - 107 mmol/L    CO2 22 " 22 - 31 mmol/L    Anion Gap, Calculation 10 5 - 18 mmol/L    Glucose 100 70 - 125 mg/dL    Calcium 9.1 8.5 - 10.5 mg/dL    BUN 8 8 - 28 mg/dL    Creatinine 0.76 0.60 - 1.10 mg/dL    GFR MDRD Af Amer >60 >60 mL/min/1.73m2    GFR MDRD Non Af Amer >60 >60 mL/min/1.73m2     Lab Results   Component Value Date    WBC 15.4 (H) 06/10/2019    HGB 11.5 (L) 06/10/2019    HCT 33.7 (L) 06/10/2019    MCV 87 06/10/2019     06/10/2019     Estimated Creatinine Clearance: 68.6 mL/min (by C-G formula based on SCr of 0.76 mg/dL).  Lab Results   Component Value Date    TSH 2.06 05/08/2019     ASSESSMENT/Plan:      ICD-10-CM    1. History of UTI gram-negative bacteremia with encephalopathy Z87.898    2. Hyponatremia E87.1    3. Generalized anxiety disorder F41.1    4. Obsessive-compulsive disorder, unspecified type F42.9    5. Major Depression, Single Episode In Remission F32.5    6. Irritable bowel syndrome with diarrhea K58.0      CHANGES:    Follow-up BMP and hemogram-2 due to recent hyponatremia and elevated white count.    CARE PLAN:    The care plan has been reviewed and all orders signed. Changes to care plan, if any, as noted. Otherwise, continue current plan of care.    The above has been created using voice recognition software. Please be aware that this may unintentionally  produce inaccuracies and/or nonsensical sentences.      Electronically signed by: Darron Tang, CNP

## 2021-06-19 NOTE — LETTER
Letter by Darron Tagn CNP at      Author: Darron Tang CNP Service: -- Author Type: --    Filed:  Encounter Date: 2019 Status: (Other)         Patient: Ellen Felipe   MR Number: [id number]   YOB: 1940   Date of Visit: 2019     [facility]    Name:   Ellen Felipe  : 1940  Facility:   [facility] [[id number]]   Room:   Code Status: FULL CODE -   Fac type:   SNF (Skilled Nursing Facility, TCU) -     CHIEF COMPLAINT / REASON FOR VISIT:  Chief Complaint   Patient presents with   ? Discharge Summary     TCU discharge after hospitalization for weakness, UTI with gram-negative bacteremia, hyponatremia, and hypokalemia.      [facility] from 2019 until 06/10/2019 (UTI with gram-negative bacteremia and encephalopathy)  [facility] from 06/10/2019 until 2019      HPI: Ellen is a 78 y.o. female mild memory problems (patient-endorsed), generalized anxiety disorder, depression and OCD (on both paroxetine and fluvoxamine), hypertension, hyperlipidemia, aortic stenosis, history of collagenous colitis and IBS, osteopenia.      She was admitted to the hospital on 2019 for weakness and fever and was found to have hyponatremia, hypokalemia, and a urinary tract infection, and was started on antibiotics while her hydrochlorothiazide was stopped.  The urine culture grew E. coli, and a blood culture was also positive for the same.  Antibiotics were switched to ciprofloxacin with a 10-day course, and at the time of discharge, she had stabilized, and a follow-up blood culture was not providing any growth.  There was a mildly elevated WBC count; however, she was afebrile, her blood pressure was stable, she had no tachycardia, and she was feeling comfortable.  Hydrochlorothiazide was discontinued.  Recommendation called for labs to be checked here.      TCU ISSUES    Primary diagnosis:  "Obtained a follow-up BMP and CBC with differential.  The latter does show that the white count is coming down.  The BMP shows a normal sodium level at this time.  I suspect that her hyponatremia may be due to (multiple) SSRI usage.    Depression/obsessive-compulsive disorder: It is immediately obvious that the patient suffers from anxiety.  She states, \"I struggle some with depression and anxiety.\"  Of particular note is that she is receiving 2 SSRIs, fluvoxamine for OCD (she calls it \"ruminations\" and finds the medication to be effective) and paroxetine for depression and anxiety.  She is seen by both psychiatry and psychology.  She is also on a variety of herbals/vitamins.      Hypertension: Well managed on losartan (100 mg daily) and metoprolol succinate (50 mg daily).    Discharge planning: She has a care conference today and is expected to discharge on 2019, going home with her .    ROS: She feels much improved.  No headaches or chest pains, coughing or congestion, nausea or vomiting, dizziness or dyspnea, dysuria, constipation or diarrhea, difficulty chewing or swallowing, or any integumentary issues.    Past Medical History:   Diagnosis Date   ? Depression    ? Hyperlipidemia    ? Hypertension     Created by Conversion  Replacement Utility updated for latest IMO load   ? IBS (irritable bowel syndrome)    ? OCD (obsessive compulsive disorder) 4/10/2015   ? OCD (obsessive compulsive disorder)               Family History   Problem Relation Age of Onset   ? Heart failure Mother          age 96   ? Heart failure Father          age 85   ? Diabetes type II Sister    ? Diabetes type II Brother    ? Diabetes type II Brother      Social History     Socioeconomic History   ? Marital status:      Spouse name: Not on file   ? Number of children: Not on file   ? Years of education: Not on file   ? Highest education level: Not on file   Occupational History   ? Not on file   Social Needs   ? " Financial resource strain: Not on file   ? Food insecurity:     Worry: Not on file     Inability: Not on file   ? Transportation needs:     Medical: Not on file     Non-medical: Not on file   Tobacco Use   ? Smoking status: Never Smoker   ? Smokeless tobacco: Never Used   Substance and Sexual Activity   ? Alcohol use: Yes     Alcohol/week: 0.5 oz     Types: 1 Standard drinks or equivalent per week     Comment: 1 glass of red wine   ? Drug use: No   ? Sexual activity: Not on file   Lifestyle   ? Physical activity:     Days per week: Not on file     Minutes per session: Not on file   ? Stress: Not on file   Relationships   ? Social connections:     Talks on phone: Not on file     Gets together: Not on file     Attends Faith service: Not on file     Active member of club or organization: Not on file     Attends meetings of clubs or organizations: Not on file     Relationship status: Not on file   ? Intimate partner violence:     Fear of current or ex partner: Not on file     Emotionally abused: Not on file     Physically abused: Not on file     Forced sexual activity: Not on file   Other Topics Concern   ? Not on file   Social History Narrative    She is .  They do not have children.  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       MEDICATIONS: Reviewed from the MAR, physician orders, and/or earlier progress notes.  Current Outpatient Medications   Medication Sig   ? acetaminophen (TYLENOL) 325 MG tablet Take 2 tablets (650 mg total) by mouth every 6 (six) hours as needed.   ? amLODIPine (NORVASC) 5 MG tablet Take 1 tablet (5 mg total) by mouth daily.   ? amoxicillin (AMOXIL) 500 MG capsule Take 2,000 mg by mouth see administration instructions. Take one hour prior to dental appointments as directed.   ? ascorbic acid (ASCORBIC ACID WITH MINA HIPS) 500 MG tablet Take 500 mg by mouth daily.   ? b complex vitamins (B-COMPLEX) tablet Take 1 tablet by mouth  "daily.   ? CALCIUM-VITAMIN D3 ORAL Take 1 tablet by mouth daily.   ? cholecalciferol, vitamin D3, (VITAMIN D3) 2,000 unit Tab Take 2,000 Units by mouth daily.          ? ciprofloxacin HCl (CIPRO) 500 MG tablet Take 1 tablet (500 mg total) by mouth 2 times a day at 6:00 am and 4:00 pm for 10 days.   ? docosahexanoic acid/epa (FISH OIL ORAL) Take 1 capsule by mouth daily.   ? fluvoxaMINE (LUVOX) 25 MG tablet 2 tabs in the morning and 3 tabs in the evening   ? LACTOBACILLUS ACIDOPHILUS (PROBIOTIC ORAL) Take 1 capsule by mouth daily.   ? loperamide (IMODIUM A-D) 2 mg tablet Take 2 mg by mouth daily.   ? losartan (COZAAR) 100 MG tablet TAKE 1 TABLET (100 MG TOTAL) BY MOUTH DAILY.   ? lysine (L-LYSINE) 500 mg cap Take 2 capsules by mouth daily.    ? metoprolol succinate (TOPROL-XL) 50 MG 24 hr tablet TAKE 1 TABLET (50 MG TOTAL) BY MOUTH DAILY.   ? MULTIVITAMIN ORAL Take 1 tablet by mouth daily.   ? PARoxetine (PAXIL) 10 MG tablet One tab in the morning and 2 tabs in the evening     ALLERGIES:   Allergies   Allergen Reactions   ? Sertraline Diarrhea     DIET: Regular, regular texture, thin liquids.    Vitals:    06/17/19 1333   BP: 118/65   Pulse: 72   Resp: 18   Temp: 97.6  F (36.4  C)   SpO2: 97%   Weight: 159 lb 12.8 oz (72.5 kg)   Height: 5' 8\" (1.727 m)     Body mass index is 24.3 kg/m .    EXAMINATION:   General: Pleasant, alert, and conversant -- though mildly anxious -- elderly female, sitting in a recliner, having breakfast.  Head: Normocephalic and atraumatic.   Eyes: PERRLA, sclerae clear.   ENT: Moist oral mucosa.  She has her own teeth.  No rhinorrhea or nasal discharge.  Hearing is unimpaired.  Cardiovascular: Regular rate and rhythm with a 2/6 BUFFY at the LSB.   Respiratory: Lungs clear to auscultation bilaterally.   Abdomen: Soft and nontender.   Musculoskeletal/Extremities: Age-related degenerative joint disease.  No peripheral edema.  Integument: No rashes, clinically significant lesions, or skin " breakdown.   Cognitive/Psychiatric: Alert and oriented with an apparent anxiety component.    DIAGNOSTICS:   Results for orders placed or performed in visit on 06/13/19   Basic Metabolic Panel   Result Value Ref Range    Sodium 136 136 - 145 mmol/L    Potassium 4.3 3.5 - 5.0 mmol/L    Chloride 100 98 - 107 mmol/L    CO2 26 22 - 31 mmol/L    Anion Gap, Calculation 10 5 - 18 mmol/L    Glucose 101 70 - 125 mg/dL    Calcium 9.9 8.5 - 10.5 mg/dL    BUN 18 8 - 28 mg/dL    Creatinine 0.90 0.60 - 1.10 mg/dL    GFR MDRD Af Amer >60 >60 mL/min/1.73m2    GFR MDRD Non Af Amer >60 >60 mL/min/1.73m2     Lab Results   Component Value Date    WBC 13.2 (H) 06/13/2019    HGB 12.0 06/13/2019    HCT 35.2 06/13/2019    MCV 94 06/13/2019     (H) 06/13/2019     Estimated Creatinine Clearance: 59.9 mL/min (by C-G formula based on SCr of 0.9 mg/dL).  Lab Results   Component Value Date    TSH 2.06 05/08/2019     ASSESSMENT/Plan:      ICD-10-CM    1. History of UTI gram-negative bacteremia with encephalopathy Z87.898     White count still elevated when last checked, although it has declined.   2. Hyponatremia E87.1     Possibly related to SSRI usage.   3. Generalized anxiety disorder F41.1     Treated with paroxetine.  Stable.   4. Obsessive-compulsive disorder, unspecified type F42.9     Treated with fluvoxamine.  Stable.   5. Major Depression, Single Episode In Remission F32.5    6. Irritable bowel syndrome with diarrhea K58.0    7. Hypertension I10     Well-managed on current regimen of losartan and metoprolol succinate.     The patient is, or has been, under my care and I have initiated the establishment of the plan of care. This patient will be followed by a physician who will periodically review the plan of care.  Initial follow-up should be within 7-10 days.    Approximate time spent with this patient was greater than 30 minutes with greater than 50% spent in discussions regarding labs and other issues prior to discharge.    The  above has been created using voice recognition software. Please be aware that this may unintentionally  produce inaccuracies and/or nonsensical sentences.      Electronically signed by: Darron Tang CNP

## 2021-06-19 NOTE — LETTER
Letter by Shellie Rosario MD at      Author: Shellie Rosario MD Service: -- Author Type: --    Filed:  Encounter Date: 6/14/2019 Status: (Other)         Patient: Ellen Felipe   MR Number: [id number]   YOB: 1940   Date of Visit: 6/14/2019     [facility]      Facility:    [facility] [[id number]]    Code Status: FULL CODE    6/6 through 6/10/2019      Chief Complaint/Reason for Visit:  Chief Complaint   Patient presents with   ? H & P       HPI:   Ellen is a 78 y.o. female with past history of  - Aortic stenosis  -Hypertension  -Collagenous colitis  -Irritable bowel syndrome  -Generalized anxiety disorder since her early 20s  -OCD  - Hypercholesterolemia.    Been having trouble with high blood pressures: In the clinic amlodipine was added.  By phone call hydrochlorothiazide was added on to her regimen about a week later.  She developed progressive weakness at home.  She was seen at the clinic but looked ill, and was sent to the emergency department.  Lab work-up found hyponatremia ( 123) hypokalemia (3.0) HCTZ was stopped and replacements/IVF given .     She also had a positive urinalysis, and urine culture ultimately grew E. coli.  Blood cultures were also positive for E. coli..  She was on ciprofloxacin for the infections.  In retrospect, she did have some urinary incontinence, and was feeling a low-grade temperature, but had not previously had a bladder infection did not recognize it as a significant finding.    Her labs improved, she was evaluated by therapies and felt to need transitional care for further strengthening prior to discharge to home    Past Medical History:  Past Medical History:   Diagnosis Date   ? Depression    ? Hyperlipidemia    ? Hypertension     Created by Conversion  Replacement Utility updated for latest IMO load   ? IBS (irritable bowel syndrome)    ? OCD (obsessive compulsive disorder) 4/10/2015            Surgical History:  Past Surgical History:   Procedure Laterality Date   ? TX REMOVE TONSILS/ADENOIDS,<13 Y/O      Description: Tonsillectomy With Adenoidectomy;  Recorded: 2010;       Family History:   Family History   Problem Relation Age of Onset   ? Heart failure Mother          age 96   ? Heart failure Father          age 85   ? Diabetes type II Sister    ? Diabetes type II Brother    ? Diabetes type II Brother        Social History:    Social History     Socioeconomic History   ? Marital status:      Spouse name: Not on file   ? Number of children: None   ? Years of education: Not on file   ? Highest education level:  Graduate   Occupational History   ?   [place]  for [employer]   Social Needs   ? Financial resource strain: Not on file   ? Food insecurity:     Worry: Not on file     Inability: Not on file   ? Transportation needs:     Medical: Not on file     Non-medical: Not on file   Tobacco Use   ? Smoking status: Never Smoker   ? Smokeless tobacco: Never Used   Substance and Sexual Activity   ? Alcohol use: Yes     Alcohol/week: 0.5 oz     Types: 1 Standard drinks or equivalent per week     Comment: 1 glass of red wine   ? Drug use: No   ? Sexual activity: Not on file   Lifestyle   ? Physical activity:     Days per week: Not on file     Minutes per session: Not on file   ? Stress: Not on file   Relationships   ? Social connections:     Talks on phone: Not on file     Gets together: Not on file     Attends Tenriism service: Not on file     Active member of club or organization: Not on file     Attends meetings of clubs or organizations: Not on file     Relationship status: Not on file   ? Intimate partner violence:     Fear of current or ex partner: Not on file     Emotionally abused: Not on file     Physically abused: Not on file     Forced sexual activity: Not on file   Other Topics Concern   ? Not on file   Social History Narrative    She is  .  They do not have children.  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          Review of Systems   She is feeling stronger, but not yet at her baseline.  Her whole adult life she has had anxiety with ruminations, sees a psychiatrist every 3 to 4 months.  Her psychiatric meds including 2 SSRIs are from her psychiatrist.  She is more tired than her baseline, needing a cane at this time  She stays up late at night, gets up late in the morning.  Her 's on the opposite schedule.   She has many questions about Meals on Wheels, doing pre-exercises for an upcoming total knee arthroplasty.  The remainder of the comprehensive review of systems is negative     Blood pressure 112/68, pulse 80, temperature 98.6  F (37  C), resp. rate 16, weight 159 lb 12.8 oz (72.5 kg), SpO2 98 %, not currently breastfeeding.      Physical Exam   Constitutional: She is oriented to person, place, and time. She appears well-nourished. No distress.   HENT:   Mouth/Throat: Oropharynx is clear and moist.   Eyes: Conjunctivae and EOM are normal.   Cardiovascular: Regular rhythm and normal heart sounds.   No murmur heard.  Pulmonary/Chest: Breath sounds normal. She has no rales.   Abdominal: Soft. Bowel sounds are normal. There is no tenderness.   Musculoskeletal: Normal range of motion. She exhibits edema. She exhibits no tenderness.   Lymphadenopathy:     She has no cervical adenopathy.   Neurological: She is alert and oriented to person, place, and time. Coordination normal.   Skin: Skin is warm and dry.   Psychiatric: She has a normal mood and affect. Her behavior is normal. Thought content normal.     Allergies   Allergen Reactions   ? Sertraline Diarrhea       Medication List:  Current Outpatient Medications   Medication Sig   ? acetaminophen (TYLENOL) 325 MG tablet Take 2 tablets (650 mg total) by mouth every 6 (six) hours as needed.   ? amLODIPine (NORVASC) 5 MG tablet  Take 1 tablet (5 mg total) by mouth daily.   ? amoxicillin (AMOXIL) 500 MG capsule Take 2,000 mg by mouth see administration instructions. Take one hour prior to dental appointments as directed.   ? ascorbic acid (ASCORBIC ACID WITH MINA HIPS) 500 MG tablet Take 500 mg by mouth daily.   ? b complex vitamins (B-COMPLEX) tablet Take 1 tablet by mouth daily.   ? CALCIUM-VITAMIN D3 ORAL Take 1 tablet by mouth daily.   ? cholecalciferol, vitamin D3, (VITAMIN D3) 2,000 unit Tab Take 2,000 Units by mouth daily.          ? ciprofloxacin HCl (CIPRO) 500 MG tablet Take 1 tablet (500 mg total) by mouth 2 times a day at 6:00 am and 4:00 pm for 10 days.   ? docosahexanoic acid/epa (FISH OIL ORAL) Take 1 capsule by mouth daily.   ? fluvoxaMINE (LUVOX) 25 MG tablet 2 tabs in the morning and 3 tabs in the evening   ? LACTOBACILLUS ACIDOPHILUS (PROBIOTIC ORAL) Take 1 capsule by mouth daily.   ? loperamide (IMODIUM A-D) 2 mg tablet Take 2 mg by mouth daily.   ? losartan (COZAAR) 100 MG tablet TAKE 1 TABLET (100 MG TOTAL) BY MOUTH DAILY.   ? lysine (L-LYSINE) 500 mg cap Take 2 capsules by mouth daily.    ? metoprolol succinate (TOPROL-XL) 50 MG 24 hr tablet TAKE 1 TABLET (50 MG TOTAL) BY MOUTH DAILY.   ? MULTIVITAMIN ORAL Take 1 tablet by mouth daily.   ? PARoxetine (PAXIL) 10 MG tablet One tab in the morning and 2 tabs in the evening       Labs:    Ref Range & Units 6/13/19 1114 6/12/19 0930 6/10/19 0723    WBC 4.0 - 11.0 thou/uL 13.2High   14.3High   15.4High      Hemoglobin 12.0 - 16.0 g/dL 12.0  11.7Low   11.5Low      Hematocrit 35.0 - 47.0 % 35.2  32.4Low   33.7Low      MCV 80 - 100 fL 94  94  87     MCH 27.0 - 34.0 pg 32.1  33.9  29.6     MCHC 32.0 - 36.0 g/dL 34.1  36.1High   34.1     RDW 11.0 - 14.5 % 14.2  14.5  13.9     Platelets 140 - 440 thou/uL 462High   426  356        Ref Range & Units 6/13/19 1114 6/10/19 0724 6/10/19 0724    Sodium 136 - 145 mmol/L 136  135Low       Potassium 3.5 - 5.0 mmol/L 4.3  3.6  3.6      "Chloride 98 - 107 mmol/L 100  103      CO2 22 - 31 mmol/L 26  22      Anion Gap, Calculation 5 - 18 mmol/L 10  10      Glucose 70 - 125 mg/dL 101  100      Calcium 8.5 - 10.5 mg/dL 9.9  9.1      BUN 8 - 28 mg/dL 18  8      Creatinine 0.60 - 1.10 mg/dL 0.90  0.76      GFR MDRD Non Af Amer >60 mL/min/1.73m2 >60  >60         Ref Range & Units 6/6/19 1208    Sodium 136 - 145 mmol/L 123Low      Potassium 3.5 - 5.0 mmol/L 3.0Low      Chloride 98 - 107 mmol/L 90Low      CO2 22 - 31 mmol/L 25     Anion Gap, Calculation 5 - 18 mmol/L 8     Glucose 70 - 125 mg/dL 118     BUN 8 - 28 mg/dL 22     Creatinine 0.60 - 1.10 mg/dL 0.99     GFR MDRD Non Af Amer >60 mL/min/1.73m2 54Low      Bilirubin, Total 0.0 - 1.0 mg/dL 0.7     Calcium 8.5 - 10.5 mg/dL 8.4Low      Protein, Total 6.0 - 8.0 g/dL 6.5     Albumin 3.5 - 5.0 g/dL 2.6Low      Alkaline Phosphatase 45 - 120 U/L 105     AST 0 - 40 U/L 70High      ALT 0 - 45 U/L 59High         Assessment / Plan:    ICD-10-CM    1. Hyponatremia E87.1  improved with IV hydration. HCTZ stopped   2. Bacteremia : E coli R78.81  completing a course of ciprofloxacin   3. Cystitis: E coli N30.90               \"   4. Generalized anxiety disorder F41.1  Paxil   5. Obsessive-compulsive disorder, unspecified type F42.9 Luvox   6. Hypertension I10  metoprolol, losartan, Norvasc   7. Hypokalemia E87.6 replaced   8. Irritable bowel syndrome with diarrhea K58.0  Paremyd lactobacillus           Electronically signed by: Shellie Rosario MD         "

## 2021-06-20 NOTE — PROGRESS NOTES
[facility] Follow Up Note    Ellen Felipe   78 y.o. female    Date of Visit: 9/18/2018    Chief Complaint   Patient presents with     Follow-up     memory     Subjective  Ellen is in today she is upset regarding her memory and also wanted to see me before I depart next month.  Apparently, her sister was telling her that her memory was not good as she did not recollect specific details regarding a birthday card.  We tested her memory with a mini cog earlier this year as part of her annual wellness visit and she passed without anything concerning.  She has not noted anything else concerning regarding her memory and she admits she gets very anxious regarding this and will perseverate on it.    ROS A comprehensive review of systems was performed and was otherwise negative.    Social History     Social History Narrative    She is .  They do not have children.  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       Medications were reconciled.  Allergies, social and family history, and the problem list were all reviewed and updated.      Exam  General Appearance: Pleasant and alert   Vitals:    09/18/18 1149   BP: 132/74   Patient Site: Left Arm   Patient Position: Sitting   Cuff Size: Adult Regular   Pulse: 60   SpO2: 99%   Weight: 156 lb 9 oz (71 kg)      Body mass index is 24.16 kg/(m^2).  Wt Readings from Last 3 Encounters:   09/18/18 156 lb 9 oz (71 kg)   02/14/18 153 lb 7 oz (69.6 kg)   02/07/18 153 lb 7 oz (69.6 kg)     HEENT: Sclera are clear.   Lungs: Normal respirations  Abdomen: Soft and nondistended  Extremities: No edema  Skin: No rashes  Neuro: Moves all extremities and has facial symmetry  Gait: Ambulates with a normal gait    She scores 30 out of 30 on a Mini-Mental status exam and has a perfect clock drawing.    Assessment/Plan  1. Hypertension  Pressure is stable.  She was greatly reassured about her memory.  I think her  anxiety gets the best of her often.  She will follow-up with one of my colleagues here at the clinic after my departure.    2. Flu vaccine need  - Influenza High Dose, Seasonal          April D MD Ingrid  Internal and Geriatric Medicine  [facility]    Much or all of the text in this note was generated through the use of Dragon Dictate voice-to-text software. Errors in spelling or words which seem out of context are unintentional. Sound alike errors, in particular, may have escaped editing.

## 2021-06-22 NOTE — PROGRESS NOTES
1. Hypertension          Plan: Her blood pressure is good here on a couple of checks.  She thinks that the blood pressure cuff she has may be old, so I suggested that she get a new one and check Her blood pressure at 2-4 times a week, and bring us in some readings in about a month to see where she is at.  If she consistently is above 140/80 she will let us know.  If she gets any other symptoms also she will let us know.    Subjective: 78-year-old female who is here today with blood pressure concerns.  She states that she is on losartan 100 mg a day as well as metoprolol 50 mg a day.  She has noticed a few blood pressure readings higher than what she would like to see them.  She cannot remember exactly how high they were.  She has a machine that she thinks is old and that she is not sure how accurate it is.  She has not had any other symptoms, no headache no blurry vision or double vision and no numbness or tingling or swelling in her extremities.    Objective: Well-appearing female no acute distress blood pressure is good on 2 checks today.  Eyes are normal oropharynx is clear chest clear to auscultation heart regular rate and rhythm extremities without edema.

## 2021-06-24 NOTE — PROGRESS NOTES
Assessment and Plan:       1. Routine general medical examination at a health care facility  Will check fasting blood work today. Mammogram ordered. DEXA shows osteopenia, but she is at high fracture risk. Discussed initiating Foxamax on follow up (defer for now due to acute jaw pain)  - Urinalysis-UC if Indicated    2. Hypertension  On Metoprolol and Losartan, suboptimally controlled. Will monitor at home and bring records in in 2 months. May add HCTZ.  - Comprehensive Metabolic Panel  - Lipid Cascade    3. Generalized anxiety disorder, OCD, Depression  On Paxil and Luvox, managed by psychiatrist, followed by psychologist as well.  - Thyroid Stimulating Hormone (TSH)    4. Jaw pain  Suspect salivary gland inflammation/impction, no TMJ on exam. Discussed Ibuprofen and Keflex. If not better, will get CT.  - HM1(CBC and Differential)  - Erythrocyte Sedimentation Rate  - HM1 (CBC with Diff)  - cephalexin (KEFLEX) 500 MG capsule; Take 1 capsule (500 mg total) by mouth 4 (four) times a day for 7 days.  Dispense: 28 capsule; Refill: 0  - Ambulatory referral to ENT    5. Hypercholesterolemia    - Comprehensive Metabolic Panel  - Lipid Cascade    6. Osteopenia  See #1. Will consider starting on Fosamax once jaw pain resolved due to high risk of Fx, falls and Charcot foot deformity.    7. Vitamin D deficiency    - Vitamin D, Total (25-Hydroxy)    8. Encounter for screening mammogram for breast cancer  - Mammo Screening Bilateral; Future        The patient's current medical problems were reviewed.  The following health maintenance schedule was reviewed with the patient and provided in printed form in the after visit summary:   Health Maintenance   Topic Date Due     ZOSTER VACCINES (2 of 3) 03/03/2009     DEPRESSION FOLLOW UP  03/18/2019     FALL RISK ASSESSMENT  09/18/2019     DXA SCAN  02/22/2020     ADVANCE DIRECTIVES DISCUSSED WITH PATIENT  02/07/2023     TD 18+ HE  06/28/2023     PNEUMOCOCCAL POLYSACCHARIDE VACCINE AGE  65 AND OVER  Completed     INFLUENZA VACCINE RULE BASED  Completed     PNEUMOCOCCAL CONJUGATE VACCINE FOR ADULTS (PCV13 OR PREVNAR)  Completed        Subjective:   Chief Complaint: Ellen Felipe is an 78 y.o. female here for an Annual Wellness visit.     HPI: Ellen is a prior patient of Dr. Quintero since currently here to meet me for the first time and have wellness exam done.  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  She has mild memory problems, anxiety, depression and OCD, high blood pressure, hyperlipidemia, aortic stenosis, history of collagenous colitis and IBS, osteopenia.    Patient recently returned from a trip from [place] with her  a week ago and currently is noticing pain in the right side of her jaw.  It is painful to palpation.  She did see her dentist who took x-rays and did not think it was dental related.  Patient denies any ear or sinus pain.  She has taken Advil once a day which has helped.  Currently pain is worse when she opens her mouth.  On exam there is slight fullness under her right jaw and tenderness to palpation but no cellulitis.  Discussed that it could be plugged salivary gland and since there is high risk for infection will treat empirically with Keflex.    Patient does have high blood pressure and is on metoprolol and losartan.  Blood pressure in the office today is above goal.  She does go to [employer] once or twice a week and does some swimming exercises.  She denies any chest pains or palpitations.    She is followed by psychiatrist due to history of depression, anxiety and OCD.  Currently she is on Paxil and Luvox.  Recently Luvox was increased and it did help with OCD.  Patient is very soft spoken a little bit hypochondriac.    She did had bone density test done last year.  It showed osteopenia with T score of -2.0 in her spine but overall her wrist fracture was high.  She does have toe deformities and does have recurrent falls.   Discussed that she would benefit from prophylactic osteoporosis medication but we will delay diet starting them until follow-up due to current jaw pain.  Patient denies any acid reflux problems.    Review of Systems: pt gets Synvisc knee injections from ortho. She wears glasses and sees ophthalmologist yearly. No hearing problems.   Please see above.  The rest of the review of systems are negative for all systems.    Patient Care Team:  Lupe Quintero MD as PCP - General     Patient Active Problem List   Diagnosis     Benign Polyps Of The Large Intestine     Allergic Rhinitis     Diverticulosis     Osteopenia     Herpes Simplex     Hypercholesterolemia     Irritable Bowel Syndrome     Major Depression, Single Episode In Remission     Hypertension     OCD (obsessive compulsive disorder)     Aortic stenosis, mild     Collagenous colitis     Generalized anxiety disorder     Past Medical History:   Diagnosis Date     Hypertension     Created by Conversion  Replacement Utility updated for latest IMO load     OCD (obsessive compulsive disorder) 4/10/2015      Past Surgical History:   Procedure Laterality Date     [place] REMOVE TONSILS/ADENOIDS,<12 Y/O      Description: Tonsillectomy With Adenoidectomy;  Recorded: 2010;      Family History   Problem Relation Age of Onset     Heart failure Mother          age 96     Heart failure Father          age 85     Diabetes type II Sister      Diabetes type II Brother      Diabetes type II Brother       Social History     Socioeconomic History     Marital status:      Spouse name: Not on file     Number of children: Not on file     Years of education: Not on file     Highest education level: Not on file   Occupational History     Not on file   Social Needs     Financial resource strain: Not on file     Food insecurity:     Worry: Not on file     Inability: Not on file     Transportation needs:     Medical: Not on file     Non-medical: Not on file   Tobacco Use      Smoking status: Never Smoker     Smokeless tobacco: Never Used   Substance and Sexual Activity     Alcohol use: Yes     Alcohol/week: 0.5 oz     Types: 1 Standard drinks or equivalent per week     Comment: 1 glass of red wine     Drug use: No     Sexual activity: Not on file   Lifestyle     Physical activity:     Days per week: Not on file     Minutes per session: Not on file     Stress: Not on file   Relationships     Social connections:     Talks on phone: Not on file     Gets together: Not on file     Attends Bahai service: Not on file     Active member of club or organization: Not on file     Attends meetings of clubs or organizations: Not on file     Relationship status: Not on file     Intimate partner violence:     Fear of current or ex partner: Not on file     Emotionally abused: Not on file     Physically abused: Not on file     Forced sexual activity: Not on file   Other Topics Concern     Not on file   Social History Narrative    She is .  They do not have children.  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      Current Outpatient Medications   Medication Sig Dispense Refill     acetaminophen 325 mg cap Take by mouth.       ascorbic acid (ASCORBIC ACID WITH MINA HIPS) 500 MG tablet Take 500 mg by mouth daily.       b complex vitamins (B-COMPLEX) tablet Take 1 tablet by mouth daily.       calcium carbonate-vitamin D3 600 mg calcium- 200 unit cap Take 1 tablet by mouth daily.       cholecalciferol, vitamin D3, (VITAMIN D3) 2,000 unit Tab Take 1 tablet by mouth daily.       erythromycin with ethanol (EMGEL) 2 % gel Apply topically daily.       FLUOCINOLONE ACETONIDE (FLUOCINOLONE TOP) Apply topically.       fluocinonide (LIDEX) 0.05 % cream        fluvoxaMINE (LUVOX) 50 MG tablet Take 100 mg by mouth at bedtime.              LACTOBACILLUS ACIDOPHILUS (PROBIOTIC ORAL) Take 1 capsule by mouth daily.       loperamide (IMODIUM) 2 mg capsule  "Take 2 mg by mouth daily.       losartan (COZAAR) 100 MG tablet TAKE 1 TABLET (100 MG TOTAL) BY MOUTH DAILY. 90 tablet 2     lysine (L-LYSINE) 500 mg cap Take 2 capsules by mouth daily.        metoprolol succinate (TOPROL-XL) 50 MG 24 hr tablet TAKE 1 TABLET (50 MG TOTAL) BY MOUTH DAILY. 90 tablet 2     MULTIVITAMIN ORAL Take 1 tablet by mouth daily.       omega-3 fatty acids 1,000 mg cap Take 1 capsule by mouth daily.       PARoxetine (PAXIL) 10 MG tablet Take 1 tablet every AM and 1 tab every PM= total 30mg       No current facility-administered medications for this visit.       Objective:   Vital Signs:   Visit Vitals  BP (!) 160/96 (Patient Site: Left Arm, Patient Position: Sitting, Cuff Size: Adult Regular)   Pulse 64   Ht 5' 7\" (1.702 m)   Wt 161 lb 3.2 oz (73.1 kg)   SpO2 97%   BMI 25.25 kg/m         VisionScreening:  Wears glasses, followed by ophthalmologist    PHYSICAL EXAM  General: well appearing female, alert and oriented x3  EYES: Eyelids, conjunctiva, and sclera were normal. Pupils were normal.   HEAD, EARS, NOSE, MOUTH, AND THROAT: no cervical LAD, no thyromegaly or nodules appreciated. TMs are visualized and normal, oropharynx is clear. Moderate tenderness under the right jaw angle, no cellulitis in the area, mild swelling.  RESPIRATORY: respirations non labored, CTA bl, no wheezes, rales, no forced expiratory wheezing.  CARDIOVASCULAR: Heart rate and rhythm were normal.slight systolic murmur noted, no rubs,gallops. There was no peripheral edema.   GASTROINTESTINAL: Positive bowel sounds, abdomen is soft, non tender, non distended.     MUSCULOSKELETAL: Muscle mass was normal for age. No joint synovitis or deformity. Chakote foot deformity noted.  LYMPHATIC: There were no enlarged nodes palpable.  SKIN/HAIR/NAILS: Skin color was normal.  No rashes.  NEUROLOGIC: The patient was alert and oriented.  Speech was normal.  There is no facial asymmetry.   PSYCHIATRIC:  Mood and affect were normal. She is " very soft spoken.  Breast exam: no axillary LAD, masses appreciated.        Assessment Results 3/1/2019   Activities of Daily Living No help needed   Instrumental Activities of Daily Living No help needed   Get Up and Go Score Less than 12 seconds   Mini Cog Total Score 5   Some recent data might be hidden     A Mini-Cog score of 0-2 suggests the possibility of dementia, score of 3-5 suggests no dementia    Identified Health Risks:     She is at risk for lack of exercise and has been provided with information to increase physical activity for the benefit of her well-being.  Information on urinary incontinence and treatment options given to patient.  The patient was provided with suggestions to help her develop a healthy emotional lifestyle.   She is at risk for falling and has been provided with information to reduce the risk of falling at home.

## 2021-06-25 NOTE — TELEPHONE ENCOUNTER
Patient needs letter from  , stating, she has been a patient at the, [facility], since 2006  Most recently see by dr noel    Pt needs a  letter for Real ID license    Please all pt back with any questions AND when letter is ready  MARTHA  Pt will pick letter up _

## 2021-06-27 NOTE — PROGRESS NOTES
Progress Notes by Anabel Pitt CMA at 5/21/2019  1:30 PM     Author: Anabel Pitt CMA Service: -- Author Type: Certified Medical Assistant    Filed: 5/21/2019  1:48 PM Encounter Date: 5/21/2019 Status: Attested    : Anabel Pitt CMA (Certified Medical Assistant) Cosigner: Brenda Godoy MD at 5/21/2019  3:42 PM    Attestation signed by Brenda Godoy MD at 5/21/2019  3:42 PM    agree                I met with Ellen Felipe at the request of Pt to recheck her blood pressure.  Blood pressure medications on the MAR were reviewed with patient.    Patient has taken all medications as per usual regimen: Yes  Patient reports tolerating them without any issues or concerns: Yes    Vitals:    05/21/19 1344   BP: 136/72   Patient Site: Left Arm   Patient Position: Sitting   Cuff Size: Adult Regular       Blood pressure was taken, previous encounter was reviewed, recorded blood pressure below 140/90.  Patient was discharged and the note will be sent to the provider for final review.

## 2021-06-29 NOTE — PROGRESS NOTES
Progress Notes by Dolly Arteaga PT at 11/3/2020 11:30 AM     Author: Dolly Arteaga PT Service: -- Author Type: Physical Therapist    Filed: 11/3/2020  4:38 PM Encounter Date: 11/3/2020 Status: Attested    : Dolly Arteaga PT (Physical Therapist) Cosigner: Brenda Godoy MD at 11/3/2020  5:11 PM    Attestation signed by Brenda Godoy MD at 11/3/2020  5:11 PM    Agree with plan of care.  Dr MARQUIS                    [facility] Certification Request    November 3, 2020      Patient: Ellen Felipe  MR Number: [id number]  YOB: 1940  Date of Visit: 11/3/2020      Dear Dr. Brenda Godoy:    Thank you for this referral.   We are seeing Ellen Felipe for Physical Therapy of Chronic Bilateral Knee Pain from OA and Balance Problems.    Medicare and/or Medicaid requires physician review and approval of the treatment plan. Please review the plan of care and verify that you agree with the therapy plan of care by co-signing this note.      Plan of Care  Authorization / Certification Start Date: 11/03/20  Authorization / Certification End Date: 12/29/20  Authorization / Certification Number of Visits: 8  Communication with: Referral Source  Patient Related Instruction: Nature of Condition;Treatment plan and rationale;Self Care instruction;Basis of treatment;Body mechanics;Posture;Precautions;Next steps;Expected outcome  Times per Week: 1  Number of Weeks: 8  Number of Visits: 8  Discharge Planning: Independent HEP/self-management of condition, adequate progress toward goals or plateau in progress  Precautions / Restrictions : Mild memory, HTN, aortic stenosis, osteopenia  Therapeutic Exercise: ROM;Stretching;Strengthening  Neuromuscular Reeducation: balance/proprioception  Gait Training: stair climbing  Equipment: theraband      Goals:  Pt. will demonstrate/verbalize independence in self-management of condition in : 6 weeks  Pt. will be independent with home exercise  program in : 6 weeks  Patient will ascend / descend: stairs;with railing;with recipricol gait;with less difficulty;in 6 weeks  Patient will transfer: sit/stand;for in/out of chair;with less difficulty;in 6 weeks    Patient will increase : LEFS score;by _ points;for improved quality of function;for improved quality of life;in 6 weeks;Comment  by ___ points: 9  Comment: Initial Score 57        If you have any questions or concerns, please don't hesitate to call.    Sincerely,      Dolly Arteaga, PT        Physician recommendation:     ___ Follow therapist's recommendation        ___ Modify therapy      *Physician co-signature indicates they certify the need for these services furnished within this plan and while under their care.        [facility]   Knee Initial Evaluation    Patient Name: Ellen Felipe  Date of evaluation: 11/3/2020  Referral Diagnosis: Arthritis of knee  Referring provider: Brenda Godoy MD  Visit Diagnosis:     ICD-10-CM    1. Bilateral chronic knee pain  M25.561     M25.562     G89.29    2. Weakness of both lower extremities  R29.898    3. Balance problems  R26.89    4. Decreased range of motion (ROM) of right knee  M25.661         Precautions / Restrictions : Mild memory, HTN, aortic stenosis, osteopenia       Assessment:      Impairments in  pain, posture, ROM, strength, gait/locomotion and balance  Patient's signs and symptoms are consistent with Knee OA and secondary balance problems and associated weakness.  Patient responded well to instructed exercises.  Prognosis to achieve goals is  good   Pt. is appropriate for skilled PT intervention as outlined in the Plan of Care (POC).    Goals:  Pt. will demonstrate/verbalize independence in self-management of condition in : 6 weeks  Pt. will be independent with home exercise program in : 6 weeks  Patient will ascend / descend: stairs;with railing;with recipricol gait;with less difficulty;in 6 weeks  Patient will  transfer: sit/stand;for in/out of chair;with less difficulty;in 6 weeks    Patient will increase : LEFS score;by _ points;for improved quality of function;for improved quality of life;in 6 weeks;Comment  by ___ points: 9  Comment: Initial Score 57      Patient's expectations/goals are realistic.    Barriers to Learning or Achieving Goals:  Chronicity of knee condition and  Mild memory issues.       Plan / Patient Instructions:      Plan of Care:   Authorization / Certification Start Date: 11/03/20  Authorization / Certification End Date: 12/29/20  Authorization / Certification Number of Visits: 8  Communication with: Referral Source  Patient Related Instruction: Nature of Condition;Treatment plan and rationale;Self Care instruction;Basis of treatment;Body mechanics;Posture;Precautions;Next steps;Expected outcome  Times per Week: 1  Number of Weeks: 8  Number of Visits: 8  Discharge Planning: Independent HEP/self-management of condition, adequate progress toward goals or plateau in progress  Precautions / Restrictions : Mild memory, HTN, aortic stenosis, osteopenia  Therapeutic Exercise: ROM;Stretching;Strengthening  Neuromuscular Reeducation: balance/proprioception  Gait Training: stair climbing  Equipment: theraband    Goals and plan of care were established in collaboration with patient.    Plan for next visit: see 1x/week for 5 more visits then reassess progress/needs.  May continue up to 3 more to complete exercise instruction for home program.  Next:  Instruct in proper stair sequence, progressively add bilateral toe raises, mini squats,seated clamshell with theraband, tandem walking     Subjective:          History of Present Illness:    Ellen is a 80 y.o. female who presents to therapy today with complaints of left>right knee pain and stiffness currently but that can vacillate and sense of imbalance likely due to knee pain and weakness in LE chain and toe deformities. Date of onset/duration of symptoms is  exacerbation 10/8/2020 with patient noting pain over the past 5-8 years. She reports that she fell on a rock while hiking 8-9 years ago.  Onset was gradual. Symptoms are getting worse. She reports  Chronic/episodic  history of similar symptoms. She describes their previous level of function as limited with difficulty in going down stairs and sit to stand transitions.  She was seen my [facility] approximately one year ago, diagnosed with bilat torn meniscus, eventually needing surgery.    Pain Ratin  Pain rating at best: 0  Pain rating at worst: 6  Pain description:dull    Functional limitations are described as occurring with:   goes down stairs singularly, sit to stand transitions         Objective:      Note: Items left blank indicates the item was not performed or not indicated at the time of the evaluation.    Patient Outcome Measures :    Lower Extremity Functional Scale (_/80): 57     Scores range from 0-80, where a score of 80 represents maximum function. The minimal clinically important difference is a positive change of 9 points.  Tinetti:    TU seconds  Knee Examination  1. Bilateral chronic knee pain     2. Weakness of both lower extremities     3. Balance problems     4. Decreased range of motion (ROM) of right knee       Precautions / Restrictions : Mild memory, HTN, aortic stenosis, osteopenia     Involved Side: Bilateral and currently left>right  Posture Observation:  Standing:  Cerv protraction, high cerv ext, shoulders elevated, left shoulder/left iliac crest high     General sitting posture is  slouched.  Assistive Device: None  Gait Observation: slow  Lumbar Clearing: Does not provoke symptoms  Hip Clearing: Does not provoke symptoms    Knee ROM       Date:  20     AROM in degrees  Right   Left  Right   Left  Right   Left       Knee Flexion  (130 )   130   130                   Knee Extension  (0 )   15   0                 PROM in degrees  Right   Left  Right   Left   "Right   Left       Knee Flexion  (130 )                         Knee Extension  (0 )                       LE Strength                             Date:  11/03/20    Strength (MMT/5)  Right   Left  Right   Left  Right   Left       Hip Flexion   5   5                   Hip Abduction   5   5                   Hip Adduction   5 5                   Hip Extension                         Hip Internal Rotation                         Hip External Rotation                         Knee Extension   5   5                   Knee Flexion 5   5                   Ankle Dorsiflexion   5   5                   Ankle Plantarflexion                       Flexibility:  Gastroc mild tightness; HS mild tightness   & Palpation:  None    Knee Special Tests (+/-):      Knee OA Cluster   Right   Left   Ligament Tests   Right   Left    1. > 49 y/o           Lachman          2. Stiffness > 30 min.           Anterior Drawer          3. Crepitus           Posterior Drawer          4. Bony tenderness           Posterior Sag          5. Bone enlargement           Valgus Stress          6. No warmth to the touch           Varus Stress           Meniscal Tests   Right   Left    Other   Right    Left       Kiana's           Ely's             Joint line tenderness           Demarcus             Thessaly Thomas Apley's                        Treatment Today     Therapeutic Exercises:  Exercise #1: Sit to stand 4x without UE assist but stressful to knees and fatiguing  Comment #1: Standing hip abduction, 10x 2\" each each-H  Exercise #2: Standing forward SLR, 10x 2\" each-H  Comment #2: Glut set with bridge, 10x-H  Exercise #3: Issued Home Personal exercise log to record walking and exercise sessions.     Tolerated exercises well with good understanding and execution.  She had difficulty remembering some of our discussion and supplied with HEP pictures and personal exercise log for tracking.        TREATMENT MINUTES COMMENTS "   Evaluation 30 Knees and balance   Self-care/ Home management     Manual therapy     Neuromuscular Re-education     Therapeutic Activity     Therapeutic Exercises 25 See flow sheet   Gait training     Modality__________________                Total 55    Blank areas are intentional and mean the treatment did not include these items.     PT Evaluation Code: (Please list factors)  Patient History/Comorbidities: see [email or web address]  Examination: see above  Clinical Presentation: stable  Clinical Decision Making: low    Patient History/  Comorbidities Examination  (body structures and functions, activity limitations, and/or participation restrictions) Clinical Presentation Clinical Decision Making (Complexity)   No documented Comorbidities or personal factors 1-2 Elements Stable and/or uncomplicated Low   1-2 documented comorbidities or personal factor 3 Elements Evolving clinical presentation with changing characteristics Moderate   3-4 documented comorbidities or personal factors 4 or more Unstable and unpredictable High              Dolly Arteaga, PT  11/3/2020  11:50 AM

## 2021-07-03 NOTE — ADDENDUM NOTE
Addendum Note by Aleshia Beaulieu CMA at 6/28/2019 10:18 AM     Author: Aleshia Beaulieu CMA Service: -- Author Type: Certified Medical Assistant    Filed: 6/28/2019 10:18 AM Encounter Date: 6/25/2019 Status: Signed    : Aleshia Beaulieu CMA (Certified Medical Assistant)    Addended by: ALESHIA BEAULIEU on: 6/28/2019 10:18 AM        Modules accepted: Orders

## 2021-07-03 NOTE — ADDENDUM NOTE
Addendum Note by Dank Godoy MD at 6/28/2019 10:29 AM     Author: Dank Gdooy MD Service: -- Author Type: Physician    Filed: 6/28/2019 10:29 AM Encounter Date: 6/25/2019 Status: Signed    : Dank Godoy MD (Physician)    Addended by: DANK GODOY on: 6/28/2019 10:29 AM        Modules accepted: Orders

## 2021-07-03 NOTE — ADDENDUM NOTE
Addendum Note by Dank Godoy MD at 10/14/2020 10:37 AM     Author: Dank Godoy MD Service: -- Author Type: Physician    Filed: 10/14/2020 10:37 AM Encounter Date: 10/11/2020 Status: Signed    : Dank Godoy MD (Physician)    Addended by: DANK GODOY on: 10/14/2020 10:37 AM        Modules accepted: Orders

## 2021-07-03 NOTE — ADDENDUM NOTE
Addendum Note by Desmond Ponce MA at 9/28/2017  8:23 AM     Author: Desmond Ponce MA Service: -- Author Type: Medical Assistant    Filed: 9/28/2017  8:23 AM Encounter Date: 9/28/2017 Status: Signed    : Desmond Ponce MA (Medical Assistant)    Addended by: DESMOND PONCE on: 9/28/2017 08:23 AM        Modules accepted: Orders

## 2021-07-03 NOTE — ADDENDUM NOTE
Addendum Note by Jose Francisco Barrios MD at 5/14/2019  1:03 PM     Author: Jose Francisco Barrios MD Service: -- Author Type: Physician    Filed: 5/14/2019  1:03 PM Encounter Date: 5/14/2019 Status: Signed    : Jose Francisco Barrios MD (Physician)    Addended by: JOSE FRANCISCO BARRIOS on: 5/14/2019 01:03 PM        Modules accepted: Orders

## 2021-07-03 NOTE — ADDENDUM NOTE
Addendum Note by Spencer Kenney LPN at 10/14/2020 10:03 AM     Author: Spencer Kenney LPN Service: -- Author Type: Licensed Nurse    Filed: 10/14/2020 10:03 AM Encounter Date: 10/11/2020 Status: Signed    : Spencer Kenney LPN (Licensed Nurse)    Addended by: SPENCER KENNEY on: 10/14/2020 10:03 AM        Modules accepted: Orders

## 2021-07-04 NOTE — LETTER
Letter by Rodrigo Francois MD at      Author: Rodrigo Francois MD Service: -- Author Type: --    Filed:  Encounter Date: 6/8/2021 Status: (Other)         June 8, 2021     Patient: Ellen Felipe   YOB: 1940   Date of Visit: 11/27/2020       To Whom It May Concern:    Ellen Felipe has been a patient with [facility] form [facility] since 06/01/2006.     If you have any questions or concerns, please don't hesitate to call.    Sincerely,        Electronically signed by Jose Francisco Reynoso M.D.

## 2021-08-06 NOTE — PATIENT INSTRUCTIONS - HE
Patient Instructions by Brenda Godoy MD at 3/1/2019 10:15 AM     Author: Brenda Godoy MD Service: -- Author Type: Physician    Filed: 3/1/2019 11:41 AM Encounter Date: 3/1/2019 Status: Addendum    : Brenda Godoy MD (Physician)    Related Notes: Original Note by Brenda Godoy MD (Physician) filed at 3/1/2019 11:23 AM       1. gET SHINGLES VACCINE AT THE PHARMACY.    2.  Bone density  Last year showed osteopenia, but your risk of fracture is higher then average. We will discuss this on follow up.    3. Have mammogram done in May    4. Jaw pain: suspect can be due to salivary gland inflammation, possible stone. Stay hydrated, use Ibuprofen as needed for pain (1-2 tab 2-3 times a day). Take antibiotic due to high risk of infection of plaged salivary gland. If not better by Monday, see ENT.    5. Check b/p at home and follow up in 2 months.          Patient Education     Exercise for a Healthier Heart  You may wonder how you can improve the health of your heart. If youre thinking about exercise, youre on the right track. You dont need to become an athlete, but you do need a certain amount of brisk exercise to help strengthen your heart. If you have been diagnosed with a heart condition, your doctor may recommend exercise to help stabilize your condition. To help make exercise a habit, choose safe, fun activities.       Be sure to check with your health care provider before starting an exercise program.    Why exercise?  Exercising regularly offers many healthy rewards. It can help you do all of the following:    Improve your blood cholesterol levels to help prevent further heart trouble    Lower your blood pressure to help prevent a stroke or heart attack    Control diabetes, or reduce your risk of getting this disease    Improve your heart and lung function    Reach and maintain a healthy weight    Make your muscles stronger and more limber so you can stay active    Prevent falls and  fractures by slowing the loss of bone mass (osteoporosis)    Manage stress better  Exercise tips  Ease into your routine. Set small goals. Then build on them.  Exercise on most days. Aim for a total of 150 or more minutes of moderate to  vigorous intensity activity each week. Consider 40 minutes, 3 to 4 times a week. For best results, activity should last for 40 minutes on average. It is OK to work up to the 40 minute period over time. Examples of moderate-intensity activity is walking one mile in 15 minutes or 30 to 45 minutes of yard work.  Step up your daily activity level. Along with your exercise program, try being more active throughout the day. Walk instead of drive. Do more household tasks or yard work.  Choose one or more activities you enjoy. Walking is one of the easiest things you can do. You can also try swimming, riding a bike, or taking an exercise class.  Stop exercising and call your doctor if you:    Have chest pain or feel dizzy or lightheaded    Feel burning, tightness, pressure, or heaviness in your chest, neck, shoulders, back, or arms    Have unusual shortness of breath    Have increased joint or muscle pain    Have palpitations or an irregular heartbeat      8294-1071 [employer]. [address] [place] [address]. All rights reserved. This information is not intended as a substitute for professional medical care. Always follow your healthcare professional's instructions.         Patient Education   Urinary Incontinence, Female (Adult)  Urinary incontinence means loss of control of the bladder. This problem affects many women, especially as they get older. If you have incontinence, you may be embarrassed to ask for help. But know that this problem can be treated.  Types of Incontinence  There are different types of incontinence. Two of the main types are described here. You can have more than one type.    Stress incontinence. With this type, urine leaks when pressure  (stress) is put on the bladder. This may happen when you cough, sneeze, or laugh. Stress incontinence most often occurs because the pelvic floor muscles that support the bladder and urethra are weak. This can happen after pregnancy and vaginal childbirth or a hysterectomy. It can also be due to excess body weight or hormone changes.    Urge incontinence (also called overactive bladder). With this type, a sudden urge to urinate is felt often. This may happen even though there may not be much urine in the bladder. The need to urinate often during the night is common. Urge incontinence most often occurs because of bladder spasms. This may be due to bladder irritation or infection. Damage to bladder nerves or pelvic muscles, constipation, and certain medicines can also lead to urge incontinence.  Treatment of urinary incontinence depends on the cause. Further evaluation is needed to find the type you have. This will likely include an exam and certain tests. Based on the results, you and your healthcare provider can then plan treatment. Until a diagnosis is made, the home care tips below can help relieve symptoms.  Home care    Do pelvic floor muscle exercises, if they are prescribed. The pelvic floor muscles help support the bladder and urethra. Many women find that their symptoms improve when doing special exercises that strengthen these muscles. To do the exercises contract the muscles you would use to stop your stream of urine, but do this when youre not urinating. Hold for 10 seconds, then relax. Repeat 10 to 20 times in a row, at least 3 times a day. Your provider may give you other instructions for how to do the exercises and how often.    Keep a bladder diary. This helps track how often and how much you urinate over a set period of time. Bring this diary with you to your next visit with the provider. The information can help your provider learn more about your bladder problem.    Lose weight, if advised to by  your provider. Excess weight puts pressure on the bladder. Your provider can help you create a weight-loss plan thats right for you. This may include exercising more and making certain diet changes.    Don't consume foods and drinks that may irritate the bladder. These can include alcohol and caffeinated drinks.    Quit smoking. Smoking and other tobacco use can lead to chronic cough that strains the pelvic floor muscles. Smoking may also damage the bladder and urethra. Talk with your provider about treatments or methods you can use to quit smoking.    If drinking large amounts of fluid causes you to have symptoms, you may be advised to limit your fluid intake. You may also be advised to drink most of your fluids during the day and to limit fluids at night.    If youre worried about urine leakage or accidents, you may wear absorbent pads to catch urine. Change the pads often. This helps reduce discomfort. It may also reduce the risk of skin or bladder infections.  Follow-up care  Follow up with your healthcare provider, or as directed. It may take some to find the right treatment for your problem. Your treatment plan may include special therapies or medicines. Certain procedures or surgery may also be options. Be sure to discuss any questions you have with your provider.  When to seek medical advice  Call the healthcare provider right away if any of these occur:    Fever of 100.4 F (38 C) or higher, or as directed by your provider    Bladder pain or fullness    Abdominal swelling    Nausea or vomiting    Back pain    Weakness, dizziness or fainting  Date Last Reviewed: 10/1/2017    4915-6956 [employer]. [address] [place] [address]. All rights reserved. This information is not intended as a substitute for professional medical care. Always follow your healthcare professional's instructions.     Patient Education   Your Health Risk Assessment indicates you feel you are not in good emotional  health.    Recreation   Recreation is not limited to sports and team events. It includes any activity that provides relaxation, interest, enjoyment, and exercise. Recreation provides an outlet for physical, mental, and social energy. It can give a sense of worth and achievement. It can help you stay healthy.    Mental Exercise and Social Involvement  Mental and emotional health is as important as physical health. Keep in touch with friends and family. Stay as active as possible. Continue to learn and challenge yourself.   Things you can do to stay mentally active are:    Learn something new, like a foreign language or musical instrument.     Play SCRABBLE or do crossword puzzles. If you cannot find people to play these games with you at home, you can play them with others on your computer through the Internet.     Join a games club--anything from card games to chess or checkers or lawn bowling.     Start a new hobby.     Go back to school.     Volunteer.     Read.     Keep up with world events.       Patient Education   Preventing Falls in the Home  As you get older, falls are more likely. Thats because your reaction time slows. Your muscles and joints may also get stiffer, making them less flexible. Illness, medications, and vision changes can also affect your balance. A fall could leave you unable to live on your own. To make your home safer, follow these tips:    Floors    Put nonskid pads under area rugs.    Remove throw rugs.    Replace worn floor coverings.    Tack carpets firmly to each step on carpeted stairs. Put nonskid strips on the edges of uncarpeted stairs.    Keep floors and stairs free of clutter and cords.    Arrange furniture so there are clear pathways.    Clean up any spills right away.    Bathrooms    Install grab bars in the tub or shower.    Apply nonskid strips or put a nonskid rubber mat in the tub or shower.    Sit on a bath chair to bathe.    Use bathmats with nonskid  backing.    Lighting    Keep a flashlight in each room.    Put a nightlight along the pathway between the bedroom and the bathroom.    6571-9594 [employer]. [address] [place] [address], [place] [address]. All rights reserved. This information is not intended as a substitute for professional medical care. Always follow your healthcare professional's instructions.           Advance Directive  Patients advance directive was discussed and I am comfortable with the patients wishes.  Patient Education   Personalized Prevention Plan  You are due for the preventive services outlined below.  Your care team is available to assist you in scheduling these services.  If you have already completed any of these items, please share that information with your care team to update in your medical record.  Health Maintenance   Topic Date Due   ? ZOSTER VACCINES (2 of 3) 03/03/2009   ? DEPRESSION FOLLOW UP  03/18/2019   ? FALL RISK ASSESSMENT  09/18/2019   ? DXA SCAN  02/22/2020   ? ADVANCE DIRECTIVES DISCUSSED WITH PATIENT  02/07/2023   ? TD 18+ HE  06/28/2023   ? PNEUMOCOCCAL POLYSACCHARIDE VACCINE AGE 65 AND OVER  Completed   ? INFLUENZA VACCINE RULE BASED  Completed   ? PNEUMOCOCCAL CONJUGATE VACCINE FOR ADULTS (PCV13 OR PREVNAR)  Completed

## 2021-08-06 NOTE — PATIENT INSTRUCTIONS - HE
Patient Instructions by Brenda Godoy MD at 10/8/2020 10:40 AM     Author: Brenda Godoy MD Service: -- Author Type: Physician    Filed: 10/8/2020 11:34 AM Encounter Date: 10/8/2020 Status: Addendum    : Brenda Godoy MD (Physician)    Related Notes: Original Note by Brenda Godoy MD (Physician) filed at 10/8/2020 11:22 AM       1. FoR DIARRHEA: Have stool cultures done, stop vit C supplement.    2. Schedule bone DEXA scan     3. Sign up for PT for balance and strength training    4. Increase exercise daily: try to go on 2 walks a day 40 min each.     Patient Education     Exercise for a Healthier Heart  You may wonder how you can improve the health of your heart. If youre thinking about exercise, youre on the right track. You dont need to become an athlete, but you do need a certain amount of brisk exercise to help strengthen your heart. If you have been diagnosed with a heart condition, your doctor may recommend exercise to help stabilize your condition. To help make exercise a habit, choose safe, fun activities.       Be sure to check with your health care provider before starting an exercise program.    Why exercise?  Exercising regularly offers many healthy rewards. It can help you do all of the following:    Improve your blood cholesterol levels to help prevent further heart trouble    Lower your blood pressure to help prevent a stroke or heart attack    Control diabetes, or reduce your risk of getting this disease    Improve your heart and lung function    Reach and maintain a healthy weight    Make your muscles stronger and more limber so you can stay active    Prevent falls and fractures by slowing the loss of bone mass (osteoporosis)    Manage stress better  Exercise tips  Ease into your routine. Set small goals. Then build on them.  Exercise on most days. Aim for a total of 150 or more minutes of moderate to  vigorous intensity activity each week. Consider 40 minutes, 3 to 4  times a week. For best results, activity should last for 40 minutes on average. It is OK to work up to the 40 minute period over time. Examples of moderate-intensity activity is walking one mile in 15 minutes or 30 to 45 minutes of yard work.  Step up your daily activity level. Along with your exercise program, try being more active throughout the day. Walk instead of drive. Do more household tasks or yard work.  Choose one or more activities you enjoy. Walking is one of the easiest things you can do. You can also try swimming, riding a bike, or taking an exercise class.  Stop exercising and call your doctor if you:    Have chest pain or feel dizzy or lightheaded    Feel burning, tightness, pressure, or heaviness in your chest, neck, shoulders, back, or arms    Have unusual shortness of breath    Have increased joint or muscle pain    Have palpitations or an irregular heartbeat      5483-7962 [employer]. [address] [place] [address]. All rights reserved. This information is not intended as a substitute for professional medical care. Always follow your healthcare professional's instructions.         Patient Education   Understanding USDA MyPlate  The USDA (US Department of Agriculture) has guidelines to help you make healthy food choices. These are called MyPlate. MyPlate shows the food groups that make up healthy meals using the image of a place setting. Before you eat, think about the healthiest choices for what to put onto your plate or into your cup or bowl. To learn more about building a healthy plate, visit www.choosemyplate.gov.       The Food Groups    Fruits: Any fruit or 100% fruit juice counts as part of the Fruit Group. Fruits may be fresh, canned, frozen, or dried, and may be whole, cut-up, or pureed. Make half your plate fruits and vegetables.    Vegetables: Any vegetable or 100% vegetable juice counts as a member of the Vegetable Group. Vegetables may be fresh, frozen,  canned, or dried. They can be served raw or cooked and may be whole, cut-up, or mashed. Make half your plate fruits and vegetables.     Grains: All foods made from grains are part of the Grains Group. These include wheat, rice, oats, cornmeal, and barley such as bread, pasta, oatmeal, cereal, tortillas, and grits. Grains should be no more than a quarter of your plate. At least half of your grains should be whole grains.    Protein: This group includes meat, poultry, seafood, beans and peas, eggs, processed soy products (like tofu), nuts (including nut butters), and seeds. Make protein choices no more than a quarter of your plate. Meat and poultry choices should be lean or low fat.    Dairy: All fluid milk products and foods made from milk that contain calcium, like yogurt and cheese are part of the Dairy Group. (Foods that have little calcium, such as cream, butter, and cream cheese, are not part of the group.) Most dairy choices should be low-fat or fat-free.    Oils: These are fats that are liquid at room temperature. They include canola, corn, olive, soybean, and sunflower oil. Foods that are mainly oil include mayonnaise, certain salad dressings, and soft margarines. You should have only 5 to 7 teaspoons of oils a day. You probably already get this much from the food you eat.  Use [employer] to Help Build Your Meals  The SuperTracker can help you plan and track your meals and activity. You can look up individual foods to see or compare their nutritional value. You can get guidelines for what and how much you should eat. You can compare your food choices. And you can assess personal physical activities and see ways you can improve. Go to www.[employer] Systems[employer].gov/supertracker/.    4027-0542 [employer] [address] [place] [address], [place] [address]. All rights reserved. This information is not intended as a substitute for professional medical care. Always follow your healthcare professional's  instructions.           Patient Education   Instrumental Activities of Daily Living  Your Health Risk Assessment indicates you have difficulties with instrumental activities of daily living which include laundry, housekeeping, banking, shopping, using the telephone, food preparation, transportation, or taking your own medications. Please make a follow up appointment for us to address this issue in more detail.    [employer] has resources available on the following website: https://www.NGRAIN.org/caregivers.html     Also, here is a local agency that provides help with meals and other assistance:   [facility] Line: 932.593.1045     Patient Education   Urinary Incontinence, Female (Adult)  Urinary incontinence means loss of control of the bladder. This problem affects many women, especially as they get older. If you have incontinence, you may be embarrassed to ask for help. But know that this problem can be treated.  Types of Incontinence  There are different types of incontinence. Two of the main types are described here. You can have more than one type.    Stress incontinence. With this type, urine leaks when pressure (stress) is put on the bladder. This may happen when you cough, sneeze, or laugh. Stress incontinence most often occurs because the pelvic floor muscles that support the bladder and urethra are weak. This can happen after pregnancy and vaginal childbirth or a hysterectomy. It can also be due to excess body weight or hormone changes.    Urge incontinence (also called overactive bladder). With this type, a sudden urge to urinate is felt often. This may happen even though there may not be much urine in the bladder. The need to urinate often during the night is common. Urge incontinence most often occurs because of bladder spasms. This may be due to bladder irritation or infection. Damage to bladder nerves or pelvic muscles, constipation, and certain medicines can also lead to urge  incontinence.  Treatment of urinary incontinence depends on the cause. Further evaluation is needed to find the type you have. This will likely include an exam and certain tests. Based on the results, you and your healthcare provider can then plan treatment. Until a diagnosis is made, the home care tips below can help relieve symptoms.  Home care    Do pelvic floor muscle exercises, if they are prescribed. The pelvic floor muscles help support the bladder and urethra. Many women find that their symptoms improve when doing special exercises that strengthen these muscles. To do the exercises contract the muscles you would use to stop your stream of urine, but do this when youre not urinating. Hold for 10 seconds, then relax. Repeat 10 to 20 times in a row, at least 3 times a day. Your provider may give you other instructions for how to do the exercises and how often.    Keep a bladder diary. This helps track how often and how much you urinate over a set period of time. Bring this diary with you to your next visit with the provider. The information can help your provider learn more about your bladder problem.    Lose weight, if advised to by your provider. Excess weight puts pressure on the bladder. Your provider can help you create a weight-loss plan thats right for you. This may include exercising more and making certain diet changes.    Don't consume foods and drinks that may irritate the bladder. These can include alcohol and caffeinated drinks.    Quit smoking. Smoking and other tobacco use can lead to chronic cough that strains the pelvic floor muscles. Smoking may also damage the bladder and urethra. Talk with your provider about treatments or methods you can use to quit smoking.    If drinking large amounts of fluid causes you to have symptoms, you may be advised to limit your fluid intake. You may also be advised to drink most of your fluids during the day and to limit fluids at night.    If youre worried  about urine leakage or accidents, you may wear absorbent pads to catch urine. Change the pads often. This helps reduce discomfort. It may also reduce the risk of skin or bladder infections.  Follow-up care  Follow up with your healthcare provider, or as directed. It may take some to find the right treatment for your problem. Your treatment plan may include special therapies or medicines. Certain procedures or surgery may also be options. Be sure to discuss any questions you have with your provider.  When to seek medical advice  Call the healthcare provider right away if any of these occur:    Fever of 100.4 F (38 C) or higher, or as directed by your provider    Bladder pain or fullness    Abdominal swelling    Nausea or vomiting    Back pain    Weakness, dizziness or fainting  Date Last Reviewed: 10/1/2017    9083-7176 [employer]. [address] [place] [address]. All rights reserved. This information is not intended as a substitute for professional medical care. Always follow your healthcare professional's instructions.     Patient Education   Your Health Risk Assessment indicates you feel you are not in good emotional health.    Recreation   Recreation is not limited to sports and team events. It includes any activity that provides relaxation, interest, enjoyment, and exercise. Recreation provides an outlet for physical, mental, and social energy. It can give a sense of worth and achievement. It can help you stay healthy.    Mental Exercise and Social Involvement  Mental and emotional health is as important as physical health. Keep in touch with friends and family. Stay as active as possible. Continue to learn and challenge yourself.   Things you can do to stay mentally active are:    Learn something new, like a foreign language or musical instrument.     Play SCRABBLE or do crossword puzzles. If you cannot find people to play these games with you at home, you can play them with others on  your computer through the Internet.     Join a games club--anything from card games to chess or checkers or lawn bowling.     Start a new hobby.     Go back to school.     Volunteer.     Read.     Keep up with world events.         Advance Directive  Patients advance directive was discussed and I am comfortable with the patients wishes.  Patient Education   Personalized Prevention Plan  You are due for the preventive services outlined below.  Your care team is available to assist you in scheduling these services.  If you have already completed any of these items, please share that information with your care team to update in your medical record.  Health Maintenance   Topic Date Due   ? DXA SCAN  02/22/2020   ? MEDICARE ANNUAL WELLNESS VISIT  10/08/2021   ? FALL RISK ASSESSMENT  10/08/2021   ? TD 18+ HE  06/28/2023   ? LIPID  03/01/2024   ? ADVANCE CARE PLANNING  10/08/2025   ? DEPRESSION ACTION PLAN  Completed   ? Pneumococcal Vaccine: 65+ Years  Completed   ? INFLUENZA VACCINE RULE BASED  Completed   ? ZOSTER VACCINES  Completed   ? Pneumococcal Vaccine: Pediatrics (0 to 5 Years) and At-Risk Patients (6 to 64 Years)  Aged Out   ? HEPATITIS B VACCINES  Aged Out

## 2021-09-26 ENCOUNTER — HEALTH MAINTENANCE LETTER (OUTPATIENT)
Age: 81
End: 2021-09-26

## 2021-10-11 ENCOUNTER — TELEPHONE (OUTPATIENT)
Dept: INTERNAL MEDICINE | Facility: CLINIC | Age: 81
End: 2021-10-11

## 2021-10-11 ENCOUNTER — OFFICE VISIT (OUTPATIENT)
Dept: INTERNAL MEDICINE | Facility: CLINIC | Age: 81
End: 2021-10-11
Payer: MEDICARE

## 2021-10-11 VITALS
HEART RATE: 71 BPM | DIASTOLIC BLOOD PRESSURE: 52 MMHG | WEIGHT: 166 LBS | OXYGEN SATURATION: 97 % | SYSTOLIC BLOOD PRESSURE: 101 MMHG | BODY MASS INDEX: 26.39 KG/M2

## 2021-10-11 DIAGNOSIS — D04.30 SQUAMOUS CELL CARCINOMA IN SITU OF SKIN OF FACE: ICD-10-CM

## 2021-10-11 DIAGNOSIS — F42.9 OBSESSIVE-COMPULSIVE DISORDER, UNSPECIFIED TYPE: Chronic | ICD-10-CM

## 2021-10-11 DIAGNOSIS — F32.5 MAJOR DEPRESSIVE DISORDER, SINGLE EPISODE IN FULL REMISSION (H): ICD-10-CM

## 2021-10-11 DIAGNOSIS — F41.1 GENERALIZED ANXIETY DISORDER: Chronic | ICD-10-CM

## 2021-10-11 DIAGNOSIS — R53.83 FATIGUE, UNSPECIFIED TYPE: ICD-10-CM

## 2021-10-11 DIAGNOSIS — I35.1 AORTIC VALVE INSUFFICIENCY, ETIOLOGY OF CARDIAC VALVE DISEASE UNSPECIFIED: ICD-10-CM

## 2021-10-11 DIAGNOSIS — F32.5 MAJOR DEPRESSIVE DISORDER, SINGLE EPISODE IN FULL REMISSION (H): Primary | ICD-10-CM

## 2021-10-11 DIAGNOSIS — E55.9 VITAMIN D DEFICIENCY: ICD-10-CM

## 2021-10-11 DIAGNOSIS — I10 PRIMARY HYPERTENSION: ICD-10-CM

## 2021-10-11 LAB
ALBUMIN SERPL-MCNC: 4 G/DL (ref 3.5–5)
ALP SERPL-CCNC: 60 U/L (ref 45–120)
ALT SERPL W P-5'-P-CCNC: 11 U/L (ref 0–45)
ANION GAP SERPL CALCULATED.3IONS-SCNC: 7 MMOL/L (ref 5–18)
AST SERPL W P-5'-P-CCNC: 18 U/L (ref 0–40)
BASOPHILS # BLD AUTO: 0.1 10E3/UL (ref 0–0.2)
BASOPHILS NFR BLD AUTO: 1 %
BILIRUB SERPL-MCNC: 0.5 MG/DL (ref 0–1)
BUN SERPL-MCNC: 28 MG/DL (ref 8–28)
CALCIUM SERPL-MCNC: 9.6 MG/DL (ref 8.5–10.5)
CHLORIDE BLD-SCNC: 104 MMOL/L (ref 98–107)
CO2 SERPL-SCNC: 26 MMOL/L (ref 22–31)
CREAT SERPL-MCNC: 1.6 MG/DL (ref 0.6–1.1)
EOSINOPHIL # BLD AUTO: 0.2 10E3/UL (ref 0–0.7)
EOSINOPHIL NFR BLD AUTO: 2 %
ERYTHROCYTE [DISTWIDTH] IN BLOOD BY AUTOMATED COUNT: 13.2 % (ref 10–15)
GFR SERPL CREATININE-BSD FRML MDRD: 30 ML/MIN/1.73M2
GLUCOSE BLD-MCNC: 87 MG/DL (ref 70–125)
HCT VFR BLD AUTO: 38.8 % (ref 35–47)
HGB BLD-MCNC: 12.3 G/DL (ref 11.7–15.7)
IMM GRANULOCYTES # BLD: 0 10E3/UL
IMM GRANULOCYTES NFR BLD: 0 %
LYMPHOCYTES # BLD AUTO: 1.3 10E3/UL (ref 0.8–5.3)
LYMPHOCYTES NFR BLD AUTO: 19 %
MCH RBC QN AUTO: 29.7 PG (ref 26.5–33)
MCHC RBC AUTO-ENTMCNC: 31.7 G/DL (ref 31.5–36.5)
MCV RBC AUTO: 94 FL (ref 78–100)
MONOCYTES # BLD AUTO: 0.7 10E3/UL (ref 0–1.3)
MONOCYTES NFR BLD AUTO: 9 %
NEUTROPHILS # BLD AUTO: 4.8 10E3/UL (ref 1.6–8.3)
NEUTROPHILS NFR BLD AUTO: 69 %
PLATELET # BLD AUTO: 204 10E3/UL (ref 150–450)
POTASSIUM BLD-SCNC: 5.4 MMOL/L (ref 3.5–5)
PROT SERPL-MCNC: 7.3 G/DL (ref 6–8)
RBC # BLD AUTO: 4.14 10E6/UL (ref 3.8–5.2)
SODIUM SERPL-SCNC: 137 MMOL/L (ref 136–145)
TSH SERPL DL<=0.005 MIU/L-ACNC: 1.94 UIU/ML (ref 0.3–5)
WBC # BLD AUTO: 7 10E3/UL (ref 4–11)

## 2021-10-11 PROCEDURE — 99214 OFFICE O/P EST MOD 30 MIN: CPT | Mod: 25 | Performed by: INTERNAL MEDICINE

## 2021-10-11 PROCEDURE — 85025 COMPLETE CBC W/AUTO DIFF WBC: CPT | Performed by: INTERNAL MEDICINE

## 2021-10-11 PROCEDURE — 84443 ASSAY THYROID STIM HORMONE: CPT | Performed by: INTERNAL MEDICINE

## 2021-10-11 PROCEDURE — 80053 COMPREHEN METABOLIC PANEL: CPT | Performed by: INTERNAL MEDICINE

## 2021-10-11 PROCEDURE — 82306 VITAMIN D 25 HYDROXY: CPT | Performed by: INTERNAL MEDICINE

## 2021-10-11 PROCEDURE — G0008 ADMIN INFLUENZA VIRUS VAC: HCPCS | Performed by: INTERNAL MEDICINE

## 2021-10-11 PROCEDURE — 36415 COLL VENOUS BLD VENIPUNCTURE: CPT | Performed by: INTERNAL MEDICINE

## 2021-10-11 PROCEDURE — 96127 BRIEF EMOTIONAL/BEHAV ASSMT: CPT | Performed by: INTERNAL MEDICINE

## 2021-10-11 PROCEDURE — 90662 IIV NO PRSV INCREASED AG IM: CPT | Performed by: INTERNAL MEDICINE

## 2021-10-11 RX ORDER — FLUVOXAMINE MALEATE 50 MG
50 TABLET ORAL 2 TIMES DAILY
Qty: 180 TABLET | Refills: 2 | Status: SHIPPED | OUTPATIENT
Start: 2021-10-11 | End: 2022-04-14

## 2021-10-11 RX ORDER — PAROXETINE 10 MG/1
20 TABLET, FILM COATED ORAL 2 TIMES DAILY
Qty: 180 TABLET | Refills: 2 | Status: SHIPPED | OUTPATIENT
Start: 2021-10-11 | End: 2021-10-12

## 2021-10-11 ASSESSMENT — PATIENT HEALTH QUESTIONNAIRE - PHQ9: SUM OF ALL RESPONSES TO PHQ QUESTIONS 1-9: 7

## 2021-10-11 NOTE — PATIENT INSTRUCTIONS
1. [facility] in [place]:   [facility], [address]  [place] [address]   [phone]     2. Get Pfizer booster vaccine at the pharmacy.    3. Drink more fluids, check b/p at home and let me know if b/p at home <110 frequently.    4. I will put consult for Moh's surgery too, hopefully one of the offices will get in touch with you, if not let me know.    5. We will call you to double check psychiatric meds doses before we refill them.

## 2021-10-11 NOTE — TELEPHONE ENCOUNTER
Reason for Call:  Other call back    Detailed comments: paroxetine dose is  10 mg and on the rx it states 1 tab q evening and 1 in the a.m.  And the other fluvoxamine 2 tabs 50mg qday  But on hte documents from today it says to take 2 of  25 in the am and 3 25 mg In the evening/  Please call to verify doseages  Phone Number Patient can be reached at: Home number on file [phone] (home)    Best Time:     Can we leave a detailed message on this number? YES    Call taken on 10/11/2021 at 3:20 PM by Mariah Newman

## 2021-10-11 NOTE — PROGRESS NOTES
[facility] Follow Up Note    Assessment/Plan:    1. Major Depression, Single Episode In Remission, anxiety, OCD  Her psychiatrist has retired.  Currently her PHQ-9 and HARDY-7 screens are good.  We will continue on current regimen of Luvox 50 mg twice a day and Paxil 10 mg twice a day.  Of note, in the past she has been on Luvox 50 mg in the morning and 100 in the evening in the future we can always try this dose if her symptoms are worsening.  Long-term I recommended that she establishes care with psychiatry.  She currently does have a therapist.  - MENTAL HEALTH REFERRAL  - Adult; Outpatient Treatment; Individual/Couples/Family/Group Therapy/Health Psychology; [facility] [phone]; We will contact you to schedule the appointment or please call with any questions; Future  - TSH    2. Squamous cell carcinoma in situ of skin of face  Recent diagnosis by dermatology based on shave biopsy.  Margins were positive and she needs excision by Mohs surgeon.  She has had difficult time getting in touch with the referral that they provided.  We will put an order in for [facility] dermatological surgery.  - CBC with platelets and differential    3. Primary hypertension  Blood pressure is slightly low today.  She denies any dizziness or lightheadedness.  She is on metoprolol and spironolactone.  Will check electrolytes.  - Comprehensive metabolic panel  - TSH    4. Vitamin D deficiency  - Vitamin D Deficiency    5. Fatigue, unspecified type  - CBC with platelets and differential  - Comprehensive metabolic panel  - TSH    6.  Cardiac murmur.  Previous echocardiogram in 2018showed ejection fraction of 62%, left atrial volume was moderately increased she had mild aortic stenosis with moderate aortic regurgitation.  Currently she is euvolemic.  We can repeat echocardiogram next year.    She will get flu vaccine today and will get her Covid booster in a few weeks.    Patient Instructions   1.  [facility] in [place]:   [facility] [address], [address]  [place] [address]   [phone]     2. Get Pfizer booster vaccine at the pharmacy.    3. Drink more fluids, check b/p at home and let me know if b/p at home <110 frequently.    4. I will put consult for Moh's surgery too, hopefully one of the offices will get in touch with you, if not let me know.    5. We will call you to double check psychiatric meds doses before we refill them.      Brenda Godoy MD, MD    Chief Complaint:    Chief Complaint   Patient presents with     Clinic Care Coordination - Follow-up     office visit       History of Present Illness:  Ellen is a 81 year old female with mild memory problems, anxiety, depression and OCD, high blood pressure, hyperlipidemia, aortic stenosis, history of collagenous colitis and IBS, osteopenia.  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  She is currently here for follow-up and have her medications refilled.    Since I last saw her she her psychiatrist has retired and she needs refills on her psychiatric medications.  PHQ-9 today is good.  Her last visit with psychiatrist was reviewed.  It appears that at that time she was on   Fluoxamine 50 mg in the morning and 100 mg in the evening and Paxil 10 mg twice a day.  Currently she reports that she takes Fluoxamine 50 mg twice a day and Paxil 10 mg twice a day.  We will continue on this refill.  Long-term I recommended that she does take establishes care with a psychiatry.    She recently was seen by dermatology and a mole on the right lateral jaw was removed.  Pathology showed squamous cell carcinoma with positive margins.  The recommended Mohs surgery but patient has had very difficult time getting in touch with that office.  We will put a referral through [facility].    Blood pressure today is good, 108/60.  She is on metoprolol 50 mg and spironolactone 25 mg.  She does have chronic renal insufficiency and will check  her potassium and creatinine today.    Review of Systems:  A comprehensive review of systems was performed and was otherwise negative    PFSH:  Social History: Reviewed  History   Smoking Status     Never Smoker   Smokeless Tobacco     Never Used     Social History     Social History Narrative    She is .  They do not have children.  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       Past History: Reviewed  Current Outpatient Medications   Medication Sig Dispense Refill     acetaminophen (TYLENOL) 325 MG tablet [ACETAMINOPHEN (TYLENOL) 325 MG TABLET] Take 2 tablets (650 mg total) by mouth every 6 (six) hours as needed.  0     amoxicillin (AMOXIL) 500 MG capsule [AMOXICILLIN (AMOXIL) 500 MG CAPSULE] Take 2,000 mg by mouth see administration instructions. Take one hour prior to dental appointments as directed.       ascorbic acid (ASCORBIC ACID WITH MINA HIPS) 500 MG tablet [ASCORBIC ACID (ASCORBIC ACID WITH MINA HIPS) 500 MG TABLET] Take 500 mg by mouth daily.       b complex vitamins (B-COMPLEX) tablet [B COMPLEX VITAMINS (B-COMPLEX) TABLET] Take 1 tablet by mouth daily.       CALCIUM-VITAMIN D3 ORAL [CALCIUM-VITAMIN D3 ORAL] Take 1 tablet by mouth daily.       cholecalciferol, vitamin D3, (VITAMIN D3) 2,000 unit Tab [CHOLECALCIFEROL, VITAMIN D3, (VITAMIN D3) 2,000 UNIT TAB] Take 2,000 Units by mouth daily.              docosahexanoic acid/epa (FISH OIL ORAL) [DOCOSAHEXANOIC ACID/EPA (FISH OIL ORAL)] Take 1 capsule by mouth daily.       fluvoxaMINE (LUVOX) 25 MG tablet [FLUVOXAMINE (LUVOX) 25 MG TABLET] 2 tabs in the morning and 3 tabs in the evening  0     LACTOBACILLUS ACIDOPHILUS (PROBIOTIC ORAL) [LACTOBACILLUS ACIDOPHILUS (PROBIOTIC ORAL)] Take 1 capsule by mouth daily.       loperamide (IMODIUM A-D) 2 mg tablet [LOPERAMIDE (IMODIUM A-D) 2 MG TABLET] Take 2 mg by mouth daily.       MEDICATION CANNOT BE REORDERED - PLEASE MANUALLY REORDER AND DISCONTINUE  THE OLD ORDER [LYSINE (L-LYSINE) 500 MG CAP] Take 2 capsules by mouth daily.        metoprolol succinate (TOPROL-XL) 50 MG 24 hr tablet [METOPROLOL SUCCINATE (TOPROL-XL) 50 MG 24 HR TABLET] TAKE 1 TABLET (50 MG TOTAL) BY MOUTH DAILY. 90 tablet 2     MULTIVITAMIN ORAL [MULTIVITAMIN ORAL] Take 1 tablet by mouth daily.       PARoxetine (PAXIL) 10 MG tablet [PAROXETINE (PAXIL) 10 MG TABLET] One tab in the morning and 2 tabs in the evening  0     spironolactone (ALDACTONE) 25 MG tablet [SPIRONOLACTONE (ALDACTONE) 25 MG TABLET] Take 1 tablet (25 mg total) by mouth daily. 90 tablet 3     bismuth subsalicylate 525 mg/15 mL Susp [BISMUTH SUBSALICYLATE 525 MG/15 ML SUSP] 15 ml by mouth 3 times a day 354 mL 11     butenafine (LOTRIMIN ULTRA) 1 % cream [BUTENAFINE (LOTRIMIN ULTRA) 1 % CREAM] Apply 2ce a day for 10 days to corners of the mouth 30 g 0     conjugated estrogens (PREMARIN) vaginal cream [CONJUGATED ESTROGENS (PREMARIN) VAGINAL CREAM] Insert 0.5 g into the vagina 2 (two) times a week. 42.5 g 4       Family History: Reviewed    Physical Exam:    Vitals:    10/11/21 1146   BP: 101/52   BP Location: Left arm   Patient Position: Sitting   Cuff Size: Adult Regular   Pulse: 71   SpO2: 97%   Weight: 75.3 kg (166 lb)     Wt Readings from Last 3 Encounters:   10/11/21 75.3 kg (166 lb)   10/08/20 73.9 kg (163 lb)   07/03/20 74.4 kg (164 lb)     Body mass index is 26.39 kg/m .    Constitutional:  Reveals a pleasant female.  Vitals:  Per nursing notes.  HEENT:No cervical LAD, no thyromegaly,  conjunctiva is pink, no scleral icterus, TMs are visualized and normal bl, oropharynx is clear, no exudates,   Cardiac:  Regular rate and rhythm, grade 2 out of 4 systolic ejection murmur in the right sternal border, no, rubs, or gallops.  Legs without edema. Palpation of the radial pulse regular.  Lungs: Clear to auscultation bl.  Respiratory effort normal.  Abdomen:positive BS, soft, nontender, nondistended.  No hepato-splenomagaly  Skin:    Without rash, bruise, or palpable lesions.  Rheumatologic: Normal joints and nails of the hands.  Neurologic:  Cranial nerves II-XII intact.     Psychiatric: affect appropriate, memory intact.     Data Review:    Analysis and Summary of Old Records (2): yes      Records Requested (1): no      Other History Summarized (from other people in the room) (2): no    Radiology Tests Summarized (XRAY/CT/MRI/DXA) (1): no    Labs Reviewed (1): yes    Medicine Tests Reviewed (EKG/ECHO/COLONOSCOPY/EGD) (1): no    Independent Review of EKG or X-RAY (2): no

## 2021-10-12 ENCOUNTER — TELEPHONE (OUTPATIENT)
Dept: INTERNAL MEDICINE | Facility: CLINIC | Age: 81
End: 2021-10-12

## 2021-10-12 DIAGNOSIS — N28.9 RENAL INSUFFICIENCY: Primary | ICD-10-CM

## 2021-10-12 LAB — DEPRECATED CALCIDIOL+CALCIFEROL SERPL-MC: 54 UG/L (ref 30–80)

## 2021-10-12 RX ORDER — PAROXETINE 10 MG/1
10 TABLET, FILM COATED ORAL 2 TIMES DAILY
Qty: 180 TABLET | Refills: 2 | Status: SHIPPED | OUTPATIENT
Start: 2021-10-12 | End: 2022-05-03

## 2021-10-12 NOTE — TELEPHONE ENCOUNTER
I have called pt at home yeaterday to verify her medication dosages that she is currently on:  Fluoxamine 50 mg tab she takes one tab BID and  Paxil 10 mg tab , she takes one tab BID. That is what I sent to the pharmacy yesterday. But I think Paxil order had a mistake ( 2 tabs twice a day). New order placed today for Paxil 10 mg BID.    Was the call from pharmacy or patient? Please clarify with them.

## 2021-10-12 NOTE — TELEPHONE ENCOUNTER
Please let pt know lab results:    Meghan,  Thyroid function is normal.  Potassium is slightly elevated, kidney function is sluggish. Please stop spironolactone medication and repeat blood work in 2 weeks, I put order in. Drink plenty of fluids.  Red cell count is normal.  Vitamin D level is good, continue current supplement.

## 2021-10-12 NOTE — TELEPHONE ENCOUNTER
Contacted patient and relayed message below.  Scheduled future lab appt. For 10/26/2021.  No further concerns or questions at this time.

## 2021-10-12 NOTE — TELEPHONE ENCOUNTER
FYI - this call was from patient to confirm medication dose.    She was advise to take Fluoxamine 50 mg - take one tab BID and  Paxil 10 mg - take one tab BID. She verbalized understanding and offers no further concerns.

## 2021-10-13 ENCOUNTER — TELEPHONE (OUTPATIENT)
Dept: DERMATOLOGY | Facility: CLINIC | Age: 81
End: 2021-10-13

## 2021-10-13 NOTE — TELEPHONE ENCOUNTER
Called and left message for patient to return call to schedule Consult prior to Mohs procedure.    Fly Kelly, Procedure

## 2021-10-14 ENCOUNTER — MYC MEDICAL ADVICE (OUTPATIENT)
Dept: INTERNAL MEDICINE | Facility: CLINIC | Age: 81
End: 2021-10-14

## 2021-10-16 NOTE — TELEPHONE ENCOUNTER
Contacted [facility] who states they had an older prescription that patient received but they also have the current dose of one 10 MG tablet BID. Contacted patient via [facility] and clarified.

## 2021-10-26 ENCOUNTER — LAB (OUTPATIENT)
Dept: LAB | Facility: CLINIC | Age: 81
End: 2021-10-26
Payer: MEDICARE

## 2021-10-26 DIAGNOSIS — N28.9 RENAL INSUFFICIENCY: ICD-10-CM

## 2021-10-26 LAB
ANION GAP SERPL CALCULATED.3IONS-SCNC: 10 MMOL/L (ref 5–18)
BUN SERPL-MCNC: 21 MG/DL (ref 8–28)
CALCIUM SERPL-MCNC: 9.5 MG/DL (ref 8.5–10.5)
CHLORIDE BLD-SCNC: 107 MMOL/L (ref 98–107)
CO2 SERPL-SCNC: 24 MMOL/L (ref 22–31)
CREAT SERPL-MCNC: 1.4 MG/DL (ref 0.6–1.1)
GFR SERPL CREATININE-BSD FRML MDRD: 36 ML/MIN/1.73M2
GLUCOSE BLD-MCNC: 90 MG/DL (ref 70–125)
POTASSIUM BLD-SCNC: 4.6 MMOL/L (ref 3.5–5)
SODIUM SERPL-SCNC: 141 MMOL/L (ref 136–145)

## 2021-10-26 PROCEDURE — 36415 COLL VENOUS BLD VENIPUNCTURE: CPT

## 2021-10-26 PROCEDURE — 80048 BASIC METABOLIC PNL TOTAL CA: CPT

## 2021-11-21 ENCOUNTER — HEALTH MAINTENANCE LETTER (OUTPATIENT)
Age: 81
End: 2021-11-21

## 2021-11-21 ENCOUNTER — MYC MEDICAL ADVICE (OUTPATIENT)
Dept: INTERNAL MEDICINE | Facility: CLINIC | Age: 81
End: 2021-11-21
Payer: MEDICARE

## 2021-11-21 DIAGNOSIS — I10 PRIMARY HYPERTENSION: Primary | ICD-10-CM

## 2021-11-22 RX ORDER — AMLODIPINE BESYLATE 2.5 MG/1
2.5 TABLET ORAL DAILY
Qty: 90 TABLET | Refills: 1 | Status: SHIPPED | OUTPATIENT
Start: 2021-11-22 | End: 2022-04-15

## 2021-12-09 ENCOUNTER — TRANSFERRED RECORDS (OUTPATIENT)
Dept: HEALTH INFORMATION MANAGEMENT | Facility: CLINIC | Age: 81
End: 2021-12-09
Payer: MEDICARE

## 2022-01-06 DIAGNOSIS — I10 ESSENTIAL HYPERTENSION: ICD-10-CM

## 2022-01-09 RX ORDER — METOPROLOL SUCCINATE 50 MG/1
TABLET, EXTENDED RELEASE ORAL
Qty: 90 TABLET | Refills: 3 | Status: SHIPPED | OUTPATIENT
Start: 2022-01-09 | End: 2022-07-08

## 2022-01-09 NOTE — TELEPHONE ENCOUNTER
"Last Written Prescription Date:  3/31/21  Last Fill Quantity: 90,  # refills: 2   Last office visit provider:  10/11/21     Requested Prescriptions   Pending Prescriptions Disp Refills     metoprolol succinate ER (TOPROL-XL) 50 MG 24 hr tablet [Pharmacy Med Name: METOPROLOL SUCC ER 50MG ** 50 Tablet] 90 tablet 2     Sig: TAKE 1 TABLET (50 MG TOTAL) BY MOUTH DAILY.       Beta-Blockers Protocol Passed - 1/6/2022 11:11 AM        Passed - Blood pressure under 140/90 in past 12 months     BP Readings from Last 3 Encounters:   10/11/21 101/52   10/08/20 104/62   07/03/20 118/70                 Passed - Patient is age 6 or older        Passed - Recent (12 mo) or future (30 days) visit within the authorizing provider's specialty     Patient has had an office visit with the authorizing provider or a provider within the authorizing providers department within the previous 12 mos or has a future within next 30 days. See \"Patient Info\" tab in inbasket, or \"Choose Columns\" in Meds & Orders section of the refill encounter.              Passed - Medication is active on med list             Guille Eaton RN 01/09/22 11:19 AM  "

## 2022-01-21 ENCOUNTER — TELEPHONE (OUTPATIENT)
Dept: INTERNAL MEDICINE | Facility: CLINIC | Age: 82
End: 2022-01-21
Payer: MEDICARE

## 2022-01-21 DIAGNOSIS — F32.5 MAJOR DEPRESSIVE DISORDER, SINGLE EPISODE IN FULL REMISSION (H): Primary | ICD-10-CM

## 2022-01-21 NOTE — TELEPHONE ENCOUNTER
Please see if pt have been able to schedule appointment with psychiatry.    If not, she can try calling [facility] [phone]   Or [facility] [phone]    I put referral in.

## 2022-02-17 DIAGNOSIS — I10 PRIMARY HYPERTENSION: ICD-10-CM

## 2022-02-18 RX ORDER — AMLODIPINE BESYLATE 2.5 MG/1
2.5 TABLET ORAL DAILY
Qty: 90 TABLET | Refills: 1 | OUTPATIENT
Start: 2022-02-18

## 2022-04-11 DIAGNOSIS — F32.5 MAJOR DEPRESSIVE DISORDER, SINGLE EPISODE IN FULL REMISSION (H): ICD-10-CM

## 2022-04-11 DIAGNOSIS — F41.1 GENERALIZED ANXIETY DISORDER: Chronic | ICD-10-CM

## 2022-04-12 DIAGNOSIS — I10 PRIMARY HYPERTENSION: ICD-10-CM

## 2022-04-14 RX ORDER — FLUVOXAMINE MALEATE 50 MG
50 TABLET ORAL 2 TIMES DAILY
Qty: 180 TABLET | Refills: 2 | Status: SHIPPED | OUTPATIENT
Start: 2022-04-14 | End: 2022-05-03

## 2022-04-14 NOTE — TELEPHONE ENCOUNTER
"Routing refill request to provider for review/approval because:  PHQ9 overdue  Refill request too soon. Should have enough with refill to last until July.        Last Written Prescription:      Last office visit provider:  10/11/21    Requested Prescriptions   Pending Prescriptions Disp Refills     fluvoxaMINE (LUVOX) 50 MG tablet [Pharmacy Med Name: FLUVOXAMINE MALEATE 50 MG T 50 Tablet] 180 tablet 2     Sig: TAKE 1 TABLET (50 MG) BY MOUTH 2 TIMES DAILY       SSRIs Protocol Failed - 4/11/2022  6:01 PM        Failed - PHQ-9 score less than 5 in past 6 months     Please review last PHQ-9 score.           Failed - Recent (6 mo) or future (30 days) visit within the authorizing provider's specialty     Patient had office visit in the last 6 months or has a visit in the next 30 days with authorizing provider or within the authorizing provider's specialty.  See \"Patient Info\" tab in inbasket, or \"Choose Columns\" in Meds & Orders section of the refill encounter.            Passed - Medication is active on med list        Passed - Patient is age 18 or older        Passed - No active pregnancy on record        Passed - No positive pregnancy test in last 12 months             Britney Sandhu RN 04/14/22 3:23 PM  "

## 2022-04-15 RX ORDER — AMLODIPINE BESYLATE 2.5 MG/1
2.5 TABLET ORAL DAILY
Qty: 90 TABLET | Refills: 11 | Status: SHIPPED | OUTPATIENT
Start: 2022-04-15 | End: 2023-06-04

## 2022-04-15 NOTE — TELEPHONE ENCOUNTER
"Routing refill request to provider for review/approval because:  Labs out of range:  Creatinine 1.40    Last Written Prescription Date:  11/22/2021  Last Fill Quantity: 90,  # refills: 1   Last office visit provider:  10/11/2021     Requested Prescriptions   Pending Prescriptions Disp Refills     amLODIPine (NORVASC) 2.5 MG tablet [Pharmacy Med Name: AMLODIPINE BESY 2.5MG** 2.5 Tablet] 90 tablet 11     Sig: TAKE 1 TABLET (2.5 MG) BY MOUTH DAILY       Calcium Channel Blockers Protocol  Failed - 4/15/2022  1:48 PM        Failed - Normal serum creatinine on file in past 12 months     Recent Labs   Lab Test 10/26/21  1334   CR 1.40*       Ok to refill medication if creatinine is low          Passed - Blood pressure under 140/90 in past 12 months     BP Readings from Last 3 Encounters:   10/11/21 101/52   10/08/20 104/62   07/03/20 118/70                 Passed - Recent (12 mo) or future (30 days) visit within the authorizing provider's specialty     Patient has had an office visit with the authorizing provider or a provider within the authorizing providers department within the previous 12 mos or has a future within next 30 days. See \"Patient Info\" tab in inbasket, or \"Choose Columns\" in Meds & Orders section of the refill encounter.              Passed - Medication is active on med list        Passed - Patient is age 18 or older        Passed - No active pregnancy on record        Passed - No positive pregnancy test in past 12 months             Odilia Bettencourt RN 04/15/22 1:48 PM  "

## 2022-05-03 ENCOUNTER — TELEPHONE (OUTPATIENT)
Dept: INTERNAL MEDICINE | Facility: CLINIC | Age: 82
End: 2022-05-03
Payer: MEDICARE

## 2022-05-03 DIAGNOSIS — U07.1 INFECTION DUE TO 2019 NOVEL CORONAVIRUS: Primary | ICD-10-CM

## 2022-05-03 DIAGNOSIS — F32.5 MAJOR DEPRESSIVE DISORDER, SINGLE EPISODE IN FULL REMISSION (H): ICD-10-CM

## 2022-05-03 DIAGNOSIS — F41.1 GENERALIZED ANXIETY DISORDER: Chronic | ICD-10-CM

## 2022-05-03 RX ORDER — PAROXETINE 10 MG/1
20 TABLET, FILM COATED ORAL 2 TIMES DAILY
Qty: 180 TABLET | Refills: 2
Start: 2022-05-03 | End: 2022-07-08

## 2022-05-03 NOTE — TELEPHONE ENCOUNTER
Spoke with pt.  Her  has covid.  Her Sx started on May1st: fatigue, sore throat, mild cough.  Risk factors: CRI, HTN, age.  She is interested in Paxlovid. She is within 5 days. Rx sent to pharmacy, renally dosed. Discussed to hold Amlodipine while on it.    Dr MARQUIS

## 2022-05-03 NOTE — TELEPHONE ENCOUNTER
Pt is calling to let Dr Godoy know that she tested positive for COVID.  She has test done yesterday at [facility] and found out today..

## 2022-05-04 ENCOUNTER — TELEPHONE (OUTPATIENT)
Dept: NURSING | Facility: CLINIC | Age: 82
End: 2022-05-04
Payer: MEDICARE

## 2022-05-04 NOTE — TELEPHONE ENCOUNTER
Nurse Triage SBAR    Is this a 2nd Level Triage? YES, LICENSED PRACTITIONER REVIEW IS REQUIRED    Situation: patient asking if she should hold metoprolol while taking Paxlovid.    Background: patient was directed to hold amlodipine while on Paxlovid but now asking if she should also be holding her metoprolol. Patient has not started medication yet today.     Assessment: provider recommendation needed.    Protocol Recommended Disposition:   No disposition on file.    Recommendation: provider input on medication     Routed to provider    Does the patient meet one of the following criteria for ADS visit consideration? 16+ years old, with an [facility] PCP     TIP  Providers, please consider if this condition is appropriate for management at one of our Acute and Diagnostic Services sites.     If patient is a good candidate, please use dotphrase <dot>triageresponse and select Refer to ADS to document.      Patient calling, she is covid positive and PCP advised to take Paxlovid and to hold her amlodipine while taking this medication.   Patient calling now to ask if she should also be holding her metoprolol medication.   Call back requested to cell phone #[phone] ok to leave detailed message on voicemail if no answer.   Odilia Bettencourt RN   05/04/22 11:28 AM  [facility] Nurse Advisor

## 2022-05-04 NOTE — TELEPHONE ENCOUNTER
Spoke with patient and relayed message below from Dr Godoy. Patient verbalized understanding of directions and has no further questions at this time.    Gunner Rhodes RN  [facility]

## 2022-05-11 ENCOUNTER — NURSE TRIAGE (OUTPATIENT)
Dept: NURSING | Facility: CLINIC | Age: 82
End: 2022-05-11
Payer: MEDICARE

## 2022-05-12 ENCOUNTER — VIRTUAL VISIT (OUTPATIENT)
Dept: INTERNAL MEDICINE | Facility: CLINIC | Age: 82
End: 2022-05-12
Payer: MEDICARE

## 2022-05-12 DIAGNOSIS — U07.1 COVID-19 VIRUS INFECTION: Primary | ICD-10-CM

## 2022-05-12 PROCEDURE — 96127 BRIEF EMOTIONAL/BEHAV ASSMT: CPT | Mod: 95 | Performed by: INTERNAL MEDICINE

## 2022-05-12 PROCEDURE — 99441 PR PHYSICIAN TELEPHONE EVALUATION 5-10 MIN: CPT | Mod: 95 | Performed by: INTERNAL MEDICINE

## 2022-05-12 ASSESSMENT — PATIENT HEALTH QUESTIONNAIRE - PHQ9: SUM OF ALL RESPONSES TO PHQ QUESTIONS 1-9: 8

## 2022-05-12 NOTE — PROGRESS NOTES
Ellen is a 81 year old female being evaluated via a billable phone visit, and would like to be contacted via the following  Home number on file [phone] (home)    ASSESSMENT and PLAN:    1. COVID-19 virus infection  Lose developed symptoms on May 1, due to history of age, hypertension and renal insufficiency she was treated with Paxil of it, tolerated it very well, currently is recovering nicely, cough has resolved, energy is coming back.  Discussed she does not need to have repeat COVID testing.  She could stop isolation after 10 days but generally recommended to still use masks in public areas to prevent infection with other viruses.  Since she had 2 shots and 1 COVID booster and recent natural COVID infection, discussed that she could wait until September to get her second booster.       CHIEF COMPLAINT:  Chief Complaint   Patient presents with     Covid Positive     5/2/22; finished Paxlovid and Fluvoxamine; fatigued     [facility] F/U       HISTORY OF PRESENT ILLNESS:  Ellen is a 81 year old female contacting the clinic today via phone for follow up post Covid infection.    Ellen is a 81 year old female with mild memory problems, anxiety, depression and OCD, high blood pressure, hyperlipidemia, aortic stenosis, history of collagenous colitis and IBS, osteopenia, CRI.  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    She is currently here for telephone visit for follow-up.    Her  contracted COVID and lives became symptomatic on May 1.  She had fatigue, sore throat and mild cough.  Due to her age, renal insufficiency and hypertension we did order Paxil with treatment for her.  She finished 5 days of it without any problems.  Currently she is feeling much better, her cough has resolved and fatigue has been improving.  She is wondering if she needs to have repeat COVID screening done to document resolution.    REVIEW OF SYSTEMS:  As above    PFSH:  Social History     Social History Narrative     "She is .  They do not have children.  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     TOBACCO USE:  History   Smoking Status     Never Smoker   Smokeless Tobacco     Never Used       VITALS:  There were no vitals filed for this visit.  There were no vitals taken for this visit. Estimated body mass index is 26.39 kg/m  as calculated from the following:    Height as of 10/8/20: 1.689 m (5' 6.5\").    Weight as of 10/11/21: 75.3 kg (166 lb).    PHYSICAL EXAM:  (observations via Phone)  Pleasant female, awake alert and oriented, concentration is good.  Spirits are up.  No shortness of breath or cough while talking to me.    MEDICATIONS  Current Outpatient Medications   Medication Sig Dispense Refill     acetaminophen (TYLENOL) 325 MG tablet [ACETAMINOPHEN (TYLENOL) 325 MG TABLET] Take 2 tablets (650 mg total) by mouth every 6 (six) hours as needed.  0     amLODIPine (NORVASC) 2.5 MG tablet TAKE 1 TABLET (2.5 MG) BY MOUTH DAILY 90 tablet 11     amoxicillin (AMOXIL) 500 MG capsule [AMOXICILLIN (AMOXIL) 500 MG CAPSULE] Take 2,000 mg by mouth see administration instructions. Take one hour prior to dental appointments as directed.       metoprolol succinate ER (TOPROL-XL) 50 MG 24 hr tablet TAKE 1 TABLET (50 MG TOTAL) BY MOUTH DAILY. 90 tablet 3     PARoxetine (PAXIL) 10 MG tablet Take 2 tablets (20 mg) by mouth 2 times daily 180 tablet 2     ascorbic acid (ASCORBIC ACID WITH MINA HIPS) 500 MG tablet [ASCORBIC ACID (ASCORBIC ACID WITH MINA HIPS) 500 MG TABLET] Take 500 mg by mouth daily. (Patient not taking: Reported on 5/12/2022)       b complex vitamins (B-COMPLEX) tablet [B COMPLEX VITAMINS (B-COMPLEX) TABLET] Take 1 tablet by mouth daily. (Patient not taking: Reported on 5/12/2022)       bismuth subsalicylate 525 mg/15 mL Susp [BISMUTH SUBSALICYLATE 525 MG/15 ML SUSP] 15 ml by mouth 3 times a day (Patient not taking: Reported on 5/12/2022) 354 mL 11     " butenafine (LOTRIMIN ULTRA) 1 % cream [BUTENAFINE (LOTRIMIN ULTRA) 1 % CREAM] Apply 2ce a day for 10 days to corners of the mouth 30 g 0     CALCIUM-VITAMIN D3 ORAL [CALCIUM-VITAMIN D3 ORAL] Take 1 tablet by mouth daily. (Patient not taking: Reported on 5/12/2022)       cholecalciferol, vitamin D3, (VITAMIN D3) 2,000 unit Tab [CHOLECALCIFEROL, VITAMIN D3, (VITAMIN D3) 2,000 UNIT TAB] Take 2,000 Units by mouth daily.        (Patient not taking: Reported on 5/12/2022)       conjugated estrogens (PREMARIN) vaginal cream [CONJUGATED ESTROGENS (PREMARIN) VAGINAL CREAM] Insert 0.5 g into the vagina 2 (two) times a week. 42.5 g 4     docosahexanoic acid/epa (FISH OIL ORAL) [DOCOSAHEXANOIC ACID/EPA (FISH OIL ORAL)] Take 1 capsule by mouth daily. (Patient not taking: Reported on 5/12/2022)       fluvoxaMINE (LUVOX) 25 MG tablet [FLUVOXAMINE (LUVOX) 25 MG TABLET] 2 tabs in the morning and 3 tabs in the evening  0     LACTOBACILLUS ACIDOPHILUS (PROBIOTIC ORAL) [LACTOBACILLUS ACIDOPHILUS (PROBIOTIC ORAL)] Take 1 capsule by mouth daily. (Patient not taking: Reported on 5/12/2022)       loperamide (IMODIUM A-D) 2 mg tablet [LOPERAMIDE (IMODIUM A-D) 2 MG TABLET] Take 2 mg by mouth daily. (Patient not taking: Reported on 5/12/2022)       MEDICATION CANNOT BE REORDERED - PLEASE MANUALLY REORDER AND DISCONTINUE THE OLD ORDER [LYSINE (L-LYSINE) 500 MG CAP] Take 2 capsules by mouth daily.        MULTIVITAMIN ORAL [MULTIVITAMIN ORAL] Take 1 tablet by mouth daily. (Patient not taking: Reported on 5/12/2022)       nirmatrelvir and ritonavir (PAXLOVID) therapy pack Take 1 tab of Nirmatrevil (150 mg) and 1 tab Ritonavir (10mg) by mouth twice a day for 5 days 30 each 0     spironolactone (ALDACTONE) 25 MG tablet [SPIRONOLACTONE (ALDACTONE) 25 MG TABLET] Take 1 tablet (25 mg total) by mouth daily. (Patient not taking: Reported on 5/12/2022) 90 tablet 3       Notes summarized:   Labs, x-rays, cardiology, GI tests reviewed:   Recent Labs   Lab  Test 10/26/21  1334 10/11/21  1312 10/11/21  1311 10/08/20  1141 10/08/20  1141   HGB  --  12.3  --  12.1  --     137  --   --  138   POTASSIUM 4.6 5.4*  --   --  4.3   CR 1.40* 1.60*  --   --  1.59*   TSH  --  1.94  --   --  2.54   VITDT  --   --  54  --   --      No results found for: [id number]  Lab Results   Component Value Date    CHOL 178 03/01/2019     New orders: No orders of the defined types were placed in this encounter.      Independent review of:  Supplemental history by:      Patient would like to receive their AVS by Mary KENNEY KRYSTAL  [facility]    Phone Start Time: 4:56 PM  Phone End time: 5:03 PM  Conversation plus orders: 7 minutes  Dictation time:  3 minutes    The visit lasted a total of 7 minutes

## 2022-05-12 NOTE — TELEPHONE ENCOUNTER
TRIAGE CALL     Patient calling   COVID positive Monday May 2nd   She already finished her course of    Paxlovid. Take 1 tab of Nirmatrevil (150 mg) and 1 tab Ritonavir (10mg) by mouth twice a day for 5 days  Tired and sleeps a lot   Wants to talk to her PCP  Denies difficulty with breathing, chest pain, dizziness, fever.  Patient is able to speak in full long sentences without getting short of breath.  Pt s PCP/Clinic: Brenda Godoy MD  [facility]  Per protocol[facility] - [facility]  Care advise given and caller s questions were answered  Reminded we will be here 24/7 and can call back and ask to speak with a nurse.   Ivania Dey RN [place] Nurse Advisor,  9:40 PM 5/11/2022  Reason for Disposition    [1] Caller has NON-URGENT question AND [2] triager unable to answer    Additional Information    Negative: SEVERE difficulty breathing (e.g., struggling for each breath, speaks in single words)    Negative: [1] SEVERE weakness (e.g., can't stand or can barely walk) AND [2] new-onset or WORSE    Negative: Difficult to awaken or acting confused (e.g., disoriented, slurred speech)    Negative: Bluish (or gray) lips or face now    Negative: Sounds like a life-threatening emergency to the triager    Negative: [1] Typical COVID-19 symptoms AND [2] lasting less than 3 weeks    Negative: [1] Chest pain, pressure, or tightness AND [2] new-onset or worsening    Negative: [1] Fever AND [2] new-onset or worsening    Negative: [1] MODERATE difficulty breathing (e.g., speaks in phrases, SOB even at rest, pulse 100-120) AND [2] new-onset or WORSE    Negative: [1] MODERATE difficulty breathing AND [2] oxygen level (e.g., pulse oximetry) 91 to 94 percent    Negative: Oxygen level (e.g., pulse oximetry) 90 percent or lower    Negative: MODERATE difficulty breathing (e.g., speaks in phrases, SOB even at rest, pulse 100-120)    Negative: [1] Drinking very little AND [2] dehydration  suspected (e.g., no urine > 12 hours, very dry mouth, very lightheaded)    Negative: Patient sounds very sick or weak to the triager    Negative: [1] MILD difficulty breathing (e.g., minimal/no SOB at rest, SOB with walking, pulse <100) AND [2] new-onset    Negative: Oxygen level (e.g., pulse oximetry) 91 to 94 percent    Negative: [1] PERSISTING SYMPTOMS OF COVID-19 AND [2] NEW symptom AND [3] could be serious    Negative: [1] Caller has URGENT question AND [2] triager unable to answer question    Protocols used: CORONAVIRUS (COVID-19) PERSISTING SYMPTOMS FOLLOW-UP CALL-A- 1.18.2022

## 2022-05-18 ENCOUNTER — TELEPHONE (OUTPATIENT)
Dept: INTERNAL MEDICINE | Facility: CLINIC | Age: 82
End: 2022-05-18
Payer: MEDICARE

## 2022-05-18 NOTE — TELEPHONE ENCOUNTER
"Patient stopped into the clinic with her  who states \"I think she is having a mild stroke.\"  states patient has had sudden onset of confusion and difficulty finding words for the past 2 days. RN summoned to assess patient. Patient able to walk normally with a steady gait, but does show mild confusion. Patient exhibits mild stumbling speech patterns. No facial drooping noted. Patient's  states this speech and confusion are not normal. Patient and  asking to be seen today by Dr Godoy. RN informed patient and  that the ED would be a better place to be evaluated for a stroke. Patient and  agree to this plan.    Gunner Rhodes RN  [facility]    "

## 2022-06-01 ENCOUNTER — OFFICE VISIT (OUTPATIENT)
Dept: INTERNAL MEDICINE | Facility: CLINIC | Age: 82
End: 2022-06-01
Payer: MEDICARE

## 2022-06-01 VITALS
BODY MASS INDEX: 25.96 KG/M2 | HEART RATE: 72 BPM | DIASTOLIC BLOOD PRESSURE: 80 MMHG | SYSTOLIC BLOOD PRESSURE: 128 MMHG | HEIGHT: 67 IN | RESPIRATION RATE: 20 BRPM | WEIGHT: 165.4 LBS | OXYGEN SATURATION: 96 %

## 2022-06-01 DIAGNOSIS — D64.9 ANEMIA, UNSPECIFIED TYPE: ICD-10-CM

## 2022-06-01 DIAGNOSIS — R60.9 EDEMA, UNSPECIFIED TYPE: ICD-10-CM

## 2022-06-01 DIAGNOSIS — I63.9 CEREBROVASCULAR ACCIDENT (CVA), UNSPECIFIED MECHANISM (H): Primary | ICD-10-CM

## 2022-06-01 DIAGNOSIS — I10 PRIMARY HYPERTENSION: ICD-10-CM

## 2022-06-01 DIAGNOSIS — I35.0 AORTIC VALVE STENOSIS, ETIOLOGY OF CARDIAC VALVE DISEASE UNSPECIFIED: ICD-10-CM

## 2022-06-01 LAB
BASOPHILS # BLD AUTO: 0.1 10E3/UL (ref 0–0.2)
BASOPHILS NFR BLD AUTO: 1 %
EOSINOPHIL # BLD AUTO: 0.2 10E3/UL (ref 0–0.7)
EOSINOPHIL NFR BLD AUTO: 2 %
ERYTHROCYTE [DISTWIDTH] IN BLOOD BY AUTOMATED COUNT: 13.2 % (ref 10–15)
HCT VFR BLD AUTO: 33.3 % (ref 35–47)
HGB BLD-MCNC: 10.7 G/DL (ref 11.7–15.7)
IMM GRANULOCYTES # BLD: 0 10E3/UL
IMM GRANULOCYTES NFR BLD: 0 %
IRON SATN MFR SERPL: 10 % (ref 15–46)
IRON SERPL-MCNC: 42 UG/DL (ref 35–180)
LYMPHOCYTES # BLD AUTO: 1.6 10E3/UL (ref 0.8–5.3)
LYMPHOCYTES NFR BLD AUTO: 18 %
MCH RBC QN AUTO: 28.6 PG (ref 26.5–33)
MCHC RBC AUTO-ENTMCNC: 32.1 G/DL (ref 31.5–36.5)
MCV RBC AUTO: 89 FL (ref 78–100)
MONOCYTES # BLD AUTO: 0.8 10E3/UL (ref 0–1.3)
MONOCYTES NFR BLD AUTO: 9 %
NEUTROPHILS # BLD AUTO: 6.4 10E3/UL (ref 1.6–8.3)
NEUTROPHILS NFR BLD AUTO: 71 %
PLATELET # BLD AUTO: 227 10E3/UL (ref 150–450)
RBC # BLD AUTO: 3.74 10E6/UL (ref 3.8–5.2)
TIBC SERPL-MCNC: 411 UG/DL (ref 240–430)
VIT B12 SERPL-MCNC: 302 PG/ML (ref 213–816)
WBC # BLD AUTO: 9 10E3/UL (ref 4–11)

## 2022-06-01 PROCEDURE — 36415 COLL VENOUS BLD VENIPUNCTURE: CPT | Performed by: INTERNAL MEDICINE

## 2022-06-01 PROCEDURE — 83550 IRON BINDING TEST: CPT | Performed by: INTERNAL MEDICINE

## 2022-06-01 PROCEDURE — 85025 COMPLETE CBC W/AUTO DIFF WBC: CPT | Mod: QW | Performed by: INTERNAL MEDICINE

## 2022-06-01 PROCEDURE — 99214 OFFICE O/P EST MOD 30 MIN: CPT | Performed by: INTERNAL MEDICINE

## 2022-06-01 PROCEDURE — 82607 VITAMIN B-12: CPT | Performed by: INTERNAL MEDICINE

## 2022-06-01 RX ORDER — ROSUVASTATIN CALCIUM 10 MG/1
10 TABLET, COATED ORAL DAILY
Qty: 90 TABLET | Refills: 3 | Status: SHIPPED | OUTPATIENT
Start: 2022-06-01 | End: 2023-03-22

## 2022-06-01 RX ORDER — ROSUVASTATIN CALCIUM 10 MG/1
10 TABLET, COATED ORAL DAILY
COMMUNITY
Start: 2022-05-20 | End: 2022-06-01

## 2022-06-01 NOTE — LETTER
June 2, 2022      Meghan Felipe  [address] [place] [address]        Dear ,    We are writing to inform you of your test results.    Meghan,    Hemoglobin level is slightly below normal indicating mild anemia. Iron level is sligtly low as well.  Please eat iron reach diet (cooked spinach, red beans) and we will repeat lab work again on follow up. Let me know if you notice any blood in stool or urine.    B12 level is on the lower normal range. Please start B complex supplement and take it daily, It is good for brain health.    Dr BENITEZ Yee   Iron and iron binding capacity   Result Value Ref Range    Iron 42 35 - 180 ug/dL    Iron Binding Capacity 411 240 - 430 ug/dL    Iron Sat Index 10 (L) 15 - 46 %   Vitamin B12   Result Value Ref Range    Vitamin B12 302 213 - 816 pg/mL   CBC with platelets and differential   Result Value Ref Range    WBC Count 9.0 4.0 - 11.0 10e3/uL    RBC Count 3.74 (L) 3.80 - 5.20 10e6/uL    Hemoglobin 10.7 (L) 11.7 - 15.7 g/dL    Hematocrit 33.3 (L) 35.0 - 47.0 %    MCV 89 78 - 100 fL    MCH 28.6 26.5 - 33.0 pg    MCHC 32.1 31.5 - 36.5 g/dL    RDW 13.2 10.0 - 15.0 %    Platelet Count 227 150 - 450 10e3/uL    % Neutrophils 71 %    % Lymphocytes 18 %    % Monocytes 9 %    % Eosinophils 2 %    % Basophils 1 %    % Immature Granulocytes 0 %    Absolute Neutrophils 6.4 1.6 - 8.3 10e3/uL    Absolute Lymphocytes 1.6 0.8 - 5.3 10e3/uL    Absolute Monocytes 0.8 0.0 - 1.3 10e3/uL    Absolute Eosinophils 0.2 0.0 - 0.7 10e3/uL    Absolute Basophils 0.1 0.0 - 0.2 10e3/uL    Absolute Immature Granulocytes 0.0 <=0.4 10e3/uL       If you have any questions or concerns, please call the clinic at the number listed above.       Sincerely,      Brenda Godoy MD

## 2022-06-01 NOTE — PATIENT INSTRUCTIONS
Will check red cell count again today    2. OT referral placed.    3. complete heart Monitor    4. B/p is good today.    5. Use ace wraps for leg swelling.

## 2022-06-01 NOTE — PROGRESS NOTES
[facility] Follow Up Note    Assessment/Plan:    1. Cerebrovascular accident (CVA), unspecified mechanism (H)  Thought to be secondary to severe arctic stenosis, echocardiogram was reviewed.  Currently she also has 14-day heart monitor to screen for atrial fibrillation.  She will continue aspirin and Crestor.  We will put a referral to Occupational Therapy, she is already getting physical therapy through [facility].  - rosuvastatin (CRESTOR) 10 MG tablet; Take 1 tablet (10 mg) by mouth daily  Dispense: 90 tablet; Refill: 3  - aspirin (ASA) 81 MG EC tablet; Take 1 tablet (81 mg) by mouth daily  - Occupational Therapy Referral; Future  - Compression Sleeve/Stocking Order for DME - ONLY FOR DME    2. Anemia, unspecified type  Mild anemia noted in the hospital, will repeat levels.  Could be delusional.  - CBC with platelets and differential  - Iron and iron binding capacity  - Vitamin B12    3. Edema, unspecified type  This is mild and chronic, she can use Ace wraps and or compression stockings.  Would avoid diuretics due to severe arctic stenosis  - Compression Sleeve/Stocking Order for DME - ONLY FOR DME    4. Primary hypertension  She is on a small dose of amlodipine and metoprolol.    5.  Severe aortic stenosis.  She will be seeing cardiology in follow-up at [facility] in preparation for TAVR .  Denies any chest pains, syncope or shortness of breath.    Patient Instructions   1. Will check red cell count again today    2. OT referral placed.    3. complete heart Monitor    4. B/p is good today.    5. Use ace wraps for leg swelling.       Brenda Godoy MD, MD    Chief Complaint:    Chief Complaint   Patient presents with     Hospital F/U     [facility] 5/18-5/20 - Acute CVA - +Covid 5/18       History of Present Illness:  Ellen is a 81 year old female with mild memory problems, anxiety, depression and OCD, high blood pressure, hyperlipidemia, aortic stenosis, history of collagenous colitis and  IBS, osteopenia, CRI.  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  She is currently here to follow-up from recent hospitalization at [facility] for acute stroke.    lElen recently had COVID infection on May 18.  Subsequently she developed speech difficulty and her  brought her to the emergency room at the [facility].  Imaging there showed left frontal stroke and right coded had stroke.  Her troponin was also slightly elevated and urinalysis was positive for UTI.  She was treated with aspirin and Rocephin.  Neurology recommended adding biotin.  She also had echocardiogram which showed progression of her aortic stenosis to severe and arctic valve replacement was recommended.  Currently the stroke etiology was attributable to aortic valve narrowing.  Patient does have 14-day heart monitor on her as well right now to screen for atrial fibrillation.    Currently she is doing okay at home.  She denies any palpitations, no difficulty swallowing, no falls.  She has mild world difficulty finding but no other persistent deficits.  She has depression and is followed by psychiatry and mood so far has been stable.  She sleeps well.    Currently she denies any syncope, presyncope or shortness of breath but does have slightly decreased exercise tolerance.    She completed antibiotic for UTI.  She does have mild chronic diarrhea but usually not more than 2 bowel movements a day, she has distant history of collagenous colitis.  No abdominal pain no dysuria.    Review of Systems:  A comprehensive review of systems was performed and was otherwise negative    PFSH:  Social History: Reviewed  History   Smoking Status     Never Smoker   Smokeless Tobacco     Never Used     Social History     Social History Narrative    She is .  They do not have children.  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     Occasions reviewed and reconciled  Past History:  "Reviewed  Current Outpatient Medications   Medication Sig Dispense Refill     amoxicillin (AMOXIL) 500 MG capsule [AMOXICILLIN (AMOXIL) 500 MG CAPSULE] Take 2,000 mg by mouth see administration instructions. Take one hour prior to dental appointments as directed.       aspirin (ASA) 81 MG EC tablet Take 1 tablet (81 mg) by mouth daily       rosuvastatin (CRESTOR) 10 MG tablet Take 1 tablet (10 mg) by mouth daily 90 tablet 3     acetaminophen (TYLENOL) 325 MG tablet [ACETAMINOPHEN (TYLENOL) 325 MG TABLET] Take 2 tablets (650 mg total) by mouth every 6 (six) hours as needed.  0     amLODIPine (NORVASC) 2.5 MG tablet TAKE 1 TABLET (2.5 MG) BY MOUTH DAILY 90 tablet 11     butenafine (LOTRIMIN ULTRA) 1 % cream [BUTENAFINE (LOTRIMIN ULTRA) 1 % CREAM] Apply 2ce a day for 10 days to corners of the mouth 30 g 0     conjugated estrogens (PREMARIN) vaginal cream [CONJUGATED ESTROGENS (PREMARIN) VAGINAL CREAM] Insert 0.5 g into the vagina 2 (two) times a week. 42.5 g 4     metoprolol succinate ER (TOPROL-XL) 50 MG 24 hr tablet TAKE 1 TABLET (50 MG TOTAL) BY MOUTH DAILY. 90 tablet 3     PARoxetine (PAXIL) 10 MG tablet Take 2 tablets (20 mg) by mouth 2 times daily 180 tablet 2       Family History: Reviewed    Physical Exam:    Vitals:    06/01/22 1249   BP: 128/80   BP Location: Left arm   Patient Position: Sitting   Cuff Size: Adult Large   Pulse: 72   Resp: 20   SpO2: 96%   Weight: 75 kg (165 lb 6.4 oz)   Height: 1.695 m (5' 6.75\")     Wt Readings from Last 3 Encounters:   06/01/22 75 kg (165 lb 6.4 oz)   10/11/21 75.3 kg (166 lb)   10/08/20 73.9 kg (163 lb)     Body mass index is 26.1 kg/m .    Constitutional:  Reveals a pleasant female.  Vitals:  Per nursing notes.  HEENT:No cervical LAD, no thyromegaly,  conjunctiva is pink, no scleral icterus, TMs are visualized and normal bl, oropharynx is clear, no exudates,   Cardiac:  Regular rate and rhythm, harsh systolic ejection murmur right and left sternal border noted.  Legs " with 1+ edema bilaterally. Palpation of the radial pulse regular.  Lungs: Clear to auscultation bl.  Respiratory effort normal.  Abdomen:positive BS, soft, nontender, nondistended.  No hepato-splenomagaly  Skin:   Without rash, bruise, or palpable lesions.  Rheumatologic: Normal joints and nails of the hands.  Neurologic:  Cranial nerves II-XII intact.     Psychiatric: affect appropriate, memory intact.     Data Review:    Analysis and Summary of Old Records (2): yes      Records Requested (1): no      Other History Summarized (from other people in the room) (2): no    Radiology Tests Summarized (XRAY/CT/MRI/DXA) (1): cta    Labs Reviewed (1): yes    Medicine Tests Reviewed (EKG/ECHO/COLONOSCOPY/EGD) (1): echo    Independent Review of EKG or X-RAY (2): no

## 2022-06-24 NOTE — PATIENT INSTRUCTIONS - HE
1. Continue estrogen vaginally 2ce a week    2. Add vitamin c 500 mg a day to also help prevent UTIs    3. Ca stop Zyrtec once itchiness is resolved.   
30

## 2022-06-30 ENCOUNTER — TRANSFERRED RECORDS (OUTPATIENT)
Dept: HEALTH INFORMATION MANAGEMENT | Facility: CLINIC | Age: 82
End: 2022-06-30

## 2022-07-08 ENCOUNTER — OFFICE VISIT (OUTPATIENT)
Dept: INTERNAL MEDICINE | Facility: CLINIC | Age: 82
End: 2022-07-08
Payer: MEDICARE

## 2022-07-08 VITALS
WEIGHT: 167.3 LBS | HEIGHT: 67 IN | OXYGEN SATURATION: 96 % | DIASTOLIC BLOOD PRESSURE: 54 MMHG | RESPIRATION RATE: 20 BRPM | BODY MASS INDEX: 26.26 KG/M2 | TEMPERATURE: 97.8 F | SYSTOLIC BLOOD PRESSURE: 122 MMHG | HEART RATE: 64 BPM

## 2022-07-08 DIAGNOSIS — F41.1 GENERALIZED ANXIETY DISORDER: Chronic | ICD-10-CM

## 2022-07-08 DIAGNOSIS — D64.9 ANEMIA, UNSPECIFIED TYPE: ICD-10-CM

## 2022-07-08 DIAGNOSIS — Z95.2 S/P TAVR (TRANSCATHETER AORTIC VALVE REPLACEMENT): Primary | ICD-10-CM

## 2022-07-08 DIAGNOSIS — I10 ESSENTIAL HYPERTENSION: ICD-10-CM

## 2022-07-08 DIAGNOSIS — I63.9 CEREBROVASCULAR ACCIDENT (CVA), UNSPECIFIED MECHANISM (H): ICD-10-CM

## 2022-07-08 DIAGNOSIS — I87.2 VENOUS (PERIPHERAL) INSUFFICIENCY: ICD-10-CM

## 2022-07-08 DIAGNOSIS — F32.5 MAJOR DEPRESSIVE DISORDER, SINGLE EPISODE IN FULL REMISSION (H): ICD-10-CM

## 2022-07-08 LAB
ANION GAP SERPL CALCULATED.3IONS-SCNC: 10 MMOL/L (ref 7–15)
BASOPHILS # BLD AUTO: 0.1 10E3/UL (ref 0–0.2)
BASOPHILS NFR BLD AUTO: 1 %
BUN SERPL-MCNC: 23.3 MG/DL (ref 8–23)
CALCIUM SERPL-MCNC: 9.5 MG/DL (ref 8.8–10.2)
CHLORIDE SERPL-SCNC: 103 MMOL/L (ref 98–107)
CREAT SERPL-MCNC: 1.23 MG/DL (ref 0.51–0.95)
DEPRECATED HCO3 PLAS-SCNC: 27 MMOL/L (ref 22–29)
EOSINOPHIL # BLD AUTO: 0.3 10E3/UL (ref 0–0.7)
EOSINOPHIL NFR BLD AUTO: 4 %
ERYTHROCYTE [DISTWIDTH] IN BLOOD BY AUTOMATED COUNT: 14.3 % (ref 10–15)
GFR SERPL CREATININE-BSD FRML MDRD: 44 ML/MIN/1.73M2
GLUCOSE SERPL-MCNC: 109 MG/DL (ref 70–99)
HCT VFR BLD AUTO: 31.2 % (ref 35–47)
HGB BLD-MCNC: 9.7 G/DL (ref 11.7–15.7)
IMM GRANULOCYTES # BLD: 0 10E3/UL
IMM GRANULOCYTES NFR BLD: 0 %
IRON BINDING CAPACITY (ROCHE): 447 UG/DL (ref 240–430)
IRON SATN MFR SERPL: 14 % (ref 15–46)
IRON SERPL-MCNC: 63 UG/DL (ref 37–145)
LYMPHOCYTES # BLD AUTO: 1.2 10E3/UL (ref 0.8–5.3)
LYMPHOCYTES NFR BLD AUTO: 15 %
MCH RBC QN AUTO: 28.8 PG (ref 26.5–33)
MCHC RBC AUTO-ENTMCNC: 31.1 G/DL (ref 31.5–36.5)
MCV RBC AUTO: 93 FL (ref 78–100)
MONOCYTES # BLD AUTO: 0.8 10E3/UL (ref 0–1.3)
MONOCYTES NFR BLD AUTO: 9 %
NEUTROPHILS # BLD AUTO: 6 10E3/UL (ref 1.6–8.3)
NEUTROPHILS NFR BLD AUTO: 72 %
PLATELET # BLD AUTO: 247 10E3/UL (ref 150–450)
POTASSIUM SERPL-SCNC: 4.4 MMOL/L (ref 3.4–5.3)
RBC # BLD AUTO: 3.37 10E6/UL (ref 3.8–5.2)
SODIUM SERPL-SCNC: 140 MMOL/L (ref 136–145)
VIT B12 SERPL-MCNC: 371 PG/ML (ref 232–1245)
WBC # BLD AUTO: 8.3 10E3/UL (ref 4–11)

## 2022-07-08 PROCEDURE — 99214 OFFICE O/P EST MOD 30 MIN: CPT | Performed by: INTERNAL MEDICINE

## 2022-07-08 PROCEDURE — 83540 ASSAY OF IRON: CPT | Performed by: INTERNAL MEDICINE

## 2022-07-08 PROCEDURE — 82607 VITAMIN B-12: CPT | Performed by: INTERNAL MEDICINE

## 2022-07-08 PROCEDURE — 85025 COMPLETE CBC W/AUTO DIFF WBC: CPT | Performed by: INTERNAL MEDICINE

## 2022-07-08 PROCEDURE — 36415 COLL VENOUS BLD VENIPUNCTURE: CPT | Performed by: INTERNAL MEDICINE

## 2022-07-08 PROCEDURE — 80048 BASIC METABOLIC PNL TOTAL CA: CPT | Performed by: INTERNAL MEDICINE

## 2022-07-08 PROCEDURE — 96127 BRIEF EMOTIONAL/BEHAV ASSMT: CPT | Performed by: INTERNAL MEDICINE

## 2022-07-08 PROCEDURE — 83550 IRON BINDING TEST: CPT | Performed by: INTERNAL MEDICINE

## 2022-07-08 RX ORDER — METOPROLOL SUCCINATE 100 MG/1
100 TABLET, EXTENDED RELEASE ORAL DAILY
Start: 2022-07-08 | End: 2024-05-06

## 2022-07-08 RX ORDER — PAROXETINE 10 MG/1
20 TABLET, FILM COATED ORAL 2 TIMES DAILY
Qty: 180 TABLET | Refills: 3 | Status: SHIPPED | OUTPATIENT
Start: 2022-07-08 | End: 2023-02-24

## 2022-07-08 ASSESSMENT — PAIN SCALES - GENERAL: PAINLEVEL: NO PAIN (0)

## 2022-07-08 ASSESSMENT — PATIENT HEALTH QUESTIONNAIRE - PHQ9
SUM OF ALL RESPONSES TO PHQ QUESTIONS 1-9: 3
10. IF YOU CHECKED OFF ANY PROBLEMS, HOW DIFFICULT HAVE THESE PROBLEMS MADE IT FOR YOU TO DO YOUR WORK, TAKE CARE OF THINGS AT HOME, OR GET ALONG WITH OTHER PEOPLE: SOMEWHAT DIFFICULT
SUM OF ALL RESPONSES TO PHQ QUESTIONS 1-9: 3

## 2022-07-08 NOTE — PATIENT INSTRUCTIONS
Will check labs today    2. Use compression stalkings for swelling.    3. Get new Covid booster in the fall.

## 2022-07-08 NOTE — LETTER
July 9, 2022      Meghan Felipe  [address] [place] [address]        Dear ,    We are writing to inform you of your test results.    Meghan,  You have moderate anemia due to recent surgery and mild iron deficiency. You could try taking Iron supplement daily for next 3 months to help boost iron. I like Feosol Bifera Iron supplement since it is least constipating.    Kidney function is good.  B12 level is normal but on the lower normal range. You could consider starting B complex supplement. It does help to keep nervous system health.    Dr BENITEZ Yee   Iron and iron binding capacity   Result Value Ref Range    Iron 63 37 - 145 ug/dL    Iron Sat Index 14 (L) 15 - 46 %    Iron Binding Capacity 447 (H) 240 - 430 ug/dL   Vitamin B12   Result Value Ref Range    Vitamin B12 371 232 - 1,245 pg/mL   Basic metabolic panel  (Ca, Cl, CO2, Creat, Gluc, K, Na, BUN)   Result Value Ref Range    Creatinine 1.23 (H) 0.51 - 0.95 mg/dL    Sodium 140 136 - 145 mmol/L    Potassium 4.4 3.4 - 5.3 mmol/L    Urea Nitrogen 23.3 (H) 8.0 - 23.0 mg/dL    Chloride 103 98 - 107 mmol/L    Carbon Dioxide (CO2) 27 22 - 29 mmol/L    Anion Gap 10 7 - 15 mmol/L    Glucose 109 (H) 70 - 99 mg/dL    GFR Estimate 44 (L) >60 mL/min/1.73m2      Comment:      Effective December 21, 2021 eGFRcr in adults is calculated using the 2021 CKD-EPI creatinine equation which includes age and gender (Dolly steven al., [facility], DOI: 10.1056/KOZFib3029252)    Calcium 9.5 8.8 - 10.2 mg/dL   CBC with platelets and differential   Result Value Ref Range    WBC Count 8.3 4.0 - 11.0 10e3/uL    RBC Count 3.37 (L) 3.80 - 5.20 10e6/uL    Hemoglobin 9.7 (L) 11.7 - 15.7 g/dL    Hematocrit 31.2 (L) 35.0 - 47.0 %    MCV 93 78 - 100 fL    MCH 28.8 26.5 - 33.0 pg    MCHC 31.1 (L) 31.5 - 36.5 g/dL    RDW 14.3 10.0 - 15.0 %    Platelet Count 247 150 - 450 10e3/uL    % Neutrophils 72 %    % Lymphocytes 15 %    % Monocytes 9 %    % Eosinophils 4 %    % Basophils 1 %    %  Immature Granulocytes 0 %    Absolute Neutrophils 6.0 1.6 - 8.3 10e3/uL    Absolute Lymphocytes 1.2 0.8 - 5.3 10e3/uL    Absolute Monocytes 0.8 0.0 - 1.3 10e3/uL    Absolute Eosinophils 0.3 0.0 - 0.7 10e3/uL    Absolute Basophils 0.1 0.0 - 0.2 10e3/uL    Absolute Immature Granulocytes 0.0 <=0.4 10e3/uL       If you have any questions or concerns, please call the clinic at the number listed above.       Sincerely,      Brenda Godoy MD

## 2022-07-08 NOTE — PROGRESS NOTES
"  Assessment & Plan     S/P TAVR (transcatheter aortic valve replacement)  She has had procedure on June 21.  Overall she is recovering well.  Surgery was challenging and mild periprosthetic regurgitation was noted on echocardiogram.  Currently patient is feeling well and denies any palpitations.  She was given 14 days of furosemide due to edema, unclear if she is still on it.  We will check iron levels, CBC and BMP.  She was recommended to stay on aspirin and Plavix for 6 months, no dental procedures for 6 months, antibiotics for dental prophylaxis indefinitely thereafter.    Cerebrovascular accident (CVA), unspecified mechanism (H)  7-day heart monitor was negative for bacteria.  Stroke was felt to be due to severe aortic stenosis.  Currently no residual deficit except for mild memory issues.  She denies worsening depression    Essential hypertension  Well-controlled, she is on higher dose of metoprolol at 100 mg of amlodipine.  - metoprolol succinate ER (TOPROL XL) 100 MG 24 hr tablet; Take 1 tablet (100 mg) by mouth daily  - Basic metabolic panel  (Ca, Cl, CO2, Creat, Gluc, K, Na, BUN)    Major Depression, Single Episode In Remission  Paxil was refilled.  Previously paroxetine was stopped by psychiatry  - PARoxetine (PAXIL) 10 MG tablet; Take 2 tablets (20 mg) by mouth 2 times daily    Generalized anxiety disorder  - PARoxetine (PAXIL) 10 MG tablet; Take 2 tablets (20 mg) by mouth 2 times daily    Anemia, unspecified type  Postoperative anemia, will monitor for iron deficiency  - CBC with platelets and differential  - Iron and iron binding capacity  - Vitamin B12    Venous (peripheral) insufficiency  She completed Lasix, currently recommended compression stockings to avoid need for diuretics in the future.  - Compression Sleeve/Stocking Order for DME - ONLY FOR DME  56}     BMI:   Estimated body mass index is 26.4 kg/m  as calculated from the following:    Height as of this encounter: 1.695 m (5' 6.75\").    " Weight as of this encounter: 75.9 kg (167 lb 4.8 oz).           Return in about 3 months (around 10/8/2022).    Brenda Godoy MD  [facility]    Subjective   Meghan is a 81 year old, presenting for the following health issues:  Hospital F/U (06/20-06/22 [facility] for transcatheter aortic valve replacement) and Refill Request (Paroxetine, pt states going on vacation to [place] next Thursday, bus trip )    Answers for HPI/ROS submitted by the patient on 7/8/2022  If you checked off any problems, how difficult have these problems made it for you to do your work, take care of things at home, or get along with other people?: Somewhat difficult  PHQ9 TOTAL SCORE: 3      HPI       Hospital Follow-up Visit:    Hospital/Nursing Home/IP Rehab Facility: [facility]  Date of Admission: 06/21/2022  Date of Discharge: 06/22/2022  Reason(s) for Admission: transcatheter aortic valve replacement    Was your hospitalization related to COVID-19? No   Problems taking medications regularly:  None  Medication changes since discharge: None  Problems adhering to non-medication therapy:  None    Summary of hospitalization:   hospital discharge summary reviewed  Diagnostic Tests/Treatments reviewed.  Follow up needed: none  Other Healthcare Providers Involved in Patient s Care:         Specialist appointment - cardiology  Update since discharge: improved.       Post Discharge Medication Reconciliation: discharge medications reconciled and changed, per note/orders.  Plan of care communicated with patient     Ellen is a 81 year old female with mild memory problems, anxiety, depression and OCD, high blood pressure, hyperlipidemia, aortic stenosis, history of collagenous colitis and IBS, osteopenia, CRI.  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  Most recently she has had  "transcatheter aortic valve replacement on June 21 and is currently for a follow-up.    She is currently enrolled in outpatient physical occupational and speech therapy through [facility] enedelia Estrada.  She has not been driving yet but will be evaluated to see if she could go back to driving.    She denies any worsening depression.    Overall she has not been as physically active at home.    She has been on aspirin and Plavix since her surgery and metoprolol was increased 200 mg a day.  Blood pressure currently is well controlled.  She was recommended to stay on dual antiplatelet regimen for 6 months and continue with dental antibiotic prophylaxis indefinitely.  No dental procedures at least for 6 months after surgery.    Most recently she saw cardiology due to lower extremity swelling was started on Lasix 20 mg for 14 days.  She does not remember if she is still on it.    She does have history of diarrhea on and off for which she uses Metamucil and was told    Made.  In the past she was diagnosed with collagenous colitis.    She denies palpitations and heart monitor did not show any atrial fibrillation.    She denies any recent falls.    Review of Systems   As above        Objective    /54 (BP Location: Left arm, Patient Position: Sitting, Cuff Size: Adult Regular)   Pulse 64   Temp 97.8  F (36.6  C) (Tympanic)   Resp 20   Ht 1.695 m (5' 6.75\")   Wt 75.9 kg (167 lb 4.8 oz)   SpO2 96%   BMI 26.40 kg/m    Body mass index is 26.4 kg/m .  Physical Exam       General: well appearing female, alert and oriented x3  EYES: Eyelids, conjunctiva, and sclera were normal. Pupils were normal.   HEAD, EARS, NOSE, MOUTH, AND THROAT: no cervical LAD, no thyromegaly or nodules appreciated. TMs are visualized and normal, oropharynx is clear.  RESPIRATORY: respirations non labored, CTA bl, no wheezes, rales, no forced expiratory wheezing.  CARDIOVASCULAR: Heart rate and rhythm were normal. No slight systolic " ejection murmur still auscultated on the right and left sternal border but much better than before.. There was trace peripheral edema.   GASTROINTESTINAL: Positive bowel sounds, abdomen is soft, non tender, non distended.     MUSCULOSKELETAL: Muscle mass was normal for age. No joint synovitis or deformity.  LYMPHATIC: There were no enlarged nodes palpable.  SKIN/HAIR/NAILS: Skin color was normal.  No rashes.  NEUROLOGIC: The patient was alert and oriented.  Speech was normal.  There is no facial asymmetry.   PSYCHIATRIC:  Mood and affect were normal.           .  ..

## 2022-08-05 ENCOUNTER — TELEPHONE (OUTPATIENT)
Dept: INTERNAL MEDICINE | Facility: CLINIC | Age: 82
End: 2022-08-05

## 2022-08-05 DIAGNOSIS — I63.9 CEREBROVASCULAR ACCIDENT (CVA), UNSPECIFIED MECHANISM (H): Primary | ICD-10-CM

## 2022-08-05 NOTE — TELEPHONE ENCOUNTER
Order/Referral Request    Who is requesting: [facility]  Phone:  [phone] option 2    Orders being requested:   Neuropych testing    Reason service is needed/diagnosis:   Pt had a stroke    When are orders needed by: ASAP    Has this been discussed with Provider: No    Does patient have a preference on a Group/Provider/Facility? n/a    Does patient have an appointment scheduled?: No    Where to send orders:       Could we send this information to you in [email or web address] or would you prefer to receive a phone call?:   No preference   Okay to leave a detailed message?: Yes at Other phone number:  [phone]

## 2022-08-25 ENCOUNTER — TRANSFERRED RECORDS (OUTPATIENT)
Dept: HEALTH INFORMATION MANAGEMENT | Facility: CLINIC | Age: 82
End: 2022-08-25

## 2022-08-26 ASSESSMENT — PATIENT HEALTH QUESTIONNAIRE - PHQ9: SUM OF ALL RESPONSES TO PHQ QUESTIONS 1-9: 5

## 2022-08-26 ASSESSMENT — ANXIETY QUESTIONNAIRES
5. BEING SO RESTLESS THAT IT IS HARD TO SIT STILL: NOT AT ALL
7. FEELING AFRAID AS IF SOMETHING AWFUL MIGHT HAPPEN: NOT AT ALL
6. BECOMING EASILY ANNOYED OR IRRITABLE: SEVERAL DAYS
1. FEELING NERVOUS, ANXIOUS, OR ON EDGE: SEVERAL DAYS
IF YOU CHECKED OFF ANY PROBLEMS ON THIS QUESTIONNAIRE, HOW DIFFICULT HAVE THESE PROBLEMS MADE IT FOR YOU TO DO YOUR WORK, TAKE CARE OF THINGS AT HOME, OR GET ALONG WITH OTHER PEOPLE: SOMEWHAT DIFFICULT
GAD7 TOTAL SCORE: 4
3. WORRYING TOO MUCH ABOUT DIFFERENT THINGS: SEVERAL DAYS
2. NOT BEING ABLE TO STOP OR CONTROL WORRYING: NOT AT ALL
4. TROUBLE RELAXING: SEVERAL DAYS

## 2022-08-29 ASSESSMENT — ANXIETY QUESTIONNAIRES
8. IF YOU CHECKED OFF ANY PROBLEMS, HOW DIFFICULT HAVE THESE MADE IT FOR YOU TO DO YOUR WORK, TAKE CARE OF THINGS AT HOME, OR GET ALONG WITH OTHER PEOPLE?: SOMEWHAT DIFFICULT
GAD7 TOTAL SCORE: 4
7. FEELING AFRAID AS IF SOMETHING AWFUL MIGHT HAPPEN: NOT AT ALL
GAD7 TOTAL SCORE: 4

## 2022-08-29 ASSESSMENT — PATIENT HEALTH QUESTIONNAIRE - PHQ9
SUM OF ALL RESPONSES TO PHQ QUESTIONS 1-9: 5
10. IF YOU CHECKED OFF ANY PROBLEMS, HOW DIFFICULT HAVE THESE PROBLEMS MADE IT FOR YOU TO DO YOUR WORK, TAKE CARE OF THINGS AT HOME, OR GET ALONG WITH OTHER PEOPLE: SOMEWHAT DIFFICULT

## 2022-09-02 ENCOUNTER — OFFICE VISIT (OUTPATIENT)
Dept: INTERNAL MEDICINE | Facility: CLINIC | Age: 82
End: 2022-09-02
Payer: MEDICARE

## 2022-09-02 VITALS
BODY MASS INDEX: 26.64 KG/M2 | OXYGEN SATURATION: 98 % | DIASTOLIC BLOOD PRESSURE: 68 MMHG | HEART RATE: 66 BPM | SYSTOLIC BLOOD PRESSURE: 132 MMHG | WEIGHT: 168.8 LBS

## 2022-09-02 DIAGNOSIS — I10 PRIMARY HYPERTENSION: ICD-10-CM

## 2022-09-02 DIAGNOSIS — Z12.31 ENCOUNTER FOR SCREENING MAMMOGRAM FOR BREAST CANCER: ICD-10-CM

## 2022-09-02 DIAGNOSIS — E55.9 VITAMIN D DEFICIENCY: ICD-10-CM

## 2022-09-02 DIAGNOSIS — Z78.0 POST-MENOPAUSAL: ICD-10-CM

## 2022-09-02 DIAGNOSIS — Z95.2 S/P TAVR (TRANSCATHETER AORTIC VALVE REPLACEMENT): Primary | ICD-10-CM

## 2022-09-02 DIAGNOSIS — I63.9 CEREBROVASCULAR ACCIDENT (CVA), UNSPECIFIED MECHANISM (H): ICD-10-CM

## 2022-09-02 DIAGNOSIS — R73.09 ABNORMAL GLUCOSE: ICD-10-CM

## 2022-09-02 LAB
ANION GAP SERPL CALCULATED.3IONS-SCNC: 7 MMOL/L (ref 7–15)
BASOPHILS # BLD AUTO: 0.1 10E3/UL (ref 0–0.2)
BASOPHILS NFR BLD AUTO: 1 %
BUN SERPL-MCNC: 16.7 MG/DL (ref 8–23)
CALCIUM SERPL-MCNC: 9.3 MG/DL (ref 8.8–10.2)
CHLORIDE SERPL-SCNC: 105 MMOL/L (ref 98–107)
CREAT SERPL-MCNC: 1.26 MG/DL (ref 0.51–0.95)
DEPRECATED CALCIDIOL+CALCIFEROL SERPL-MC: 44 UG/L (ref 20–75)
DEPRECATED HCO3 PLAS-SCNC: 25 MMOL/L (ref 22–29)
EOSINOPHIL # BLD AUTO: 0.3 10E3/UL (ref 0–0.7)
EOSINOPHIL NFR BLD AUTO: 3 %
ERYTHROCYTE [DISTWIDTH] IN BLOOD BY AUTOMATED COUNT: 13.4 % (ref 10–15)
GFR SERPL CREATININE-BSD FRML MDRD: 43 ML/MIN/1.73M2
GLUCOSE SERPL-MCNC: 99 MG/DL (ref 70–99)
HBA1C MFR BLD: 5.6 % (ref 0–5.6)
HCT VFR BLD AUTO: 32.4 % (ref 35–47)
HGB BLD-MCNC: 9.9 G/DL (ref 11.7–15.7)
IMM GRANULOCYTES # BLD: 0 10E3/UL
IMM GRANULOCYTES NFR BLD: 0 %
LDLC SERPL DIRECT ASSAY-MCNC: 51 MG/DL
LYMPHOCYTES # BLD AUTO: 1.5 10E3/UL (ref 0.8–5.3)
LYMPHOCYTES NFR BLD AUTO: 17 %
MCH RBC QN AUTO: 26.8 PG (ref 26.5–33)
MCHC RBC AUTO-ENTMCNC: 30.6 G/DL (ref 31.5–36.5)
MCV RBC AUTO: 88 FL (ref 78–100)
MONOCYTES # BLD AUTO: 0.6 10E3/UL (ref 0–1.3)
MONOCYTES NFR BLD AUTO: 7 %
NEUTROPHILS # BLD AUTO: 6.5 10E3/UL (ref 1.6–8.3)
NEUTROPHILS NFR BLD AUTO: 73 %
PLATELET # BLD AUTO: 222 10E3/UL (ref 150–450)
POTASSIUM SERPL-SCNC: 4.3 MMOL/L (ref 3.4–5.3)
RBC # BLD AUTO: 3.69 10E6/UL (ref 3.8–5.2)
SODIUM SERPL-SCNC: 137 MMOL/L (ref 136–145)
WBC # BLD AUTO: 8.9 10E3/UL (ref 4–11)

## 2022-09-02 PROCEDURE — 99214 OFFICE O/P EST MOD 30 MIN: CPT | Performed by: INTERNAL MEDICINE

## 2022-09-02 PROCEDURE — 83721 ASSAY OF BLOOD LIPOPROTEIN: CPT | Performed by: INTERNAL MEDICINE

## 2022-09-02 PROCEDURE — 80048 BASIC METABOLIC PNL TOTAL CA: CPT | Performed by: INTERNAL MEDICINE

## 2022-09-02 PROCEDURE — 83036 HEMOGLOBIN GLYCOSYLATED A1C: CPT | Performed by: INTERNAL MEDICINE

## 2022-09-02 PROCEDURE — 36415 COLL VENOUS BLD VENIPUNCTURE: CPT | Performed by: INTERNAL MEDICINE

## 2022-09-02 PROCEDURE — 85025 COMPLETE CBC W/AUTO DIFF WBC: CPT | Performed by: INTERNAL MEDICINE

## 2022-09-02 PROCEDURE — 82306 VITAMIN D 25 HYDROXY: CPT | Performed by: INTERNAL MEDICINE

## 2022-09-02 ASSESSMENT — ANXIETY QUESTIONNAIRES: GAD7 TOTAL SCORE: 4

## 2022-09-02 ASSESSMENT — PATIENT HEALTH QUESTIONNAIRE - PHQ9
10. IF YOU CHECKED OFF ANY PROBLEMS, HOW DIFFICULT HAVE THESE PROBLEMS MADE IT FOR YOU TO DO YOUR WORK, TAKE CARE OF THINGS AT HOME, OR GET ALONG WITH OTHER PEOPLE: SOMEWHAT DIFFICULT
SUM OF ALL RESPONSES TO PHQ QUESTIONS 1-9: 5

## 2022-09-02 NOTE — PATIENT INSTRUCTIONS
Will check LDL cholesterol to see if it is at goal with Crestor.    2. No dental work until January.    3. Schedule mammogram and DEXA scan for December.    4. Flu shot in October.    5. Will check labs and give you a plan for b/p medication adjustment.

## 2022-09-02 NOTE — PROGRESS NOTES
"  Assessment & Plan     S/P TAVR (transcatheter aortic valve replacement)  She had surgery done on June 21.  Current recommendations were to stay on aspirin and Plavix for 6 months after procedures, no dental procedures for 6 months after procedure and after that she will need indefinite dental antibiotic prophylaxis.    Primary hypertension  Goal blood pressures less than 130/80 especially in the light of her recent stroke.  At home blood pressure is a little borderline.  She is on maximum dose of metoprolol with good heart rate control in the morning and a small dose of amlodipine at nighttime but does have slight swelling in her legs.  We will check electrolytes today consider adding losartan tiny dose.  - CBC with platelets and differential  - Basic metabolic panel  (Ca, Cl, CO2, Creat, Gluc, K, Na, BUN)  - LDL cholesterol direct    Cerebrovascular accident (CVA), unspecified mechanism (H)  Goal LDL less than 100 and blood pressure less than 130/80.  We will check LDL today.  She is on aspirin and Plavix.  - Basic metabolic panel  (Ca, Cl, CO2, Creat, Gluc, K, Na, BUN)  - LDL cholesterol direct    Abnormal glucose  And family history of diabetes, previous A1c 6.1, will repeat today  - Hemoglobin A1c    Post-menopausal  History of osteopenia with T score of -2.2 in her spine of DEXA scan in 2020.  She will repeat it again this year  - DX Hip/Pelvis/Spine; Future    Encounter for screening mammogram for breast cancer  - MA Screen Bilateral w/Franko; Future    Vitamin D deficiency  She takes calcium-vitamin D supplement daily  - Vitamin D Deficiency     BMI:   Estimated body mass index is 26.64 kg/m  as calculated from the following:    Height as of 7/8/22: 1.695 m (5' 6.75\").    Weight as of this encounter: 76.6 kg (168 lb 12.8 oz).     Return in about 3 months (around 12/2/2022) for Follow up.    Brenda Godoy MD  [facility]    Dara Christianson is a 82 year old, presenting for the " following health issues:  Rash (1 week )    Ellen is a 82 year old female with mild memory problems, anxiety, depression and OCD, high blood pressure, hyperlipidemia, aortic stenosis, history of collagenous colitis and IBS, osteopenia, CRI.  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  Most recently she has had transcatheter aortic valve replacement on June 21 2022. She  is currently for a follow-up.    Currently rash that she schedule this appointment for has resolved.    She does monitor blood pressures at home and they fluctuate between 120 5-1 43, heart rate is 62.  She does state metoprolol in the morning and amlodipine 2.5 mg at night.  She denies dizziness or lightheadedness or balance problems.  She does have lower extremity swelling which is mild.    Due to recent TAVR she is currently on aspirin and Plavix until the end of December.  She is also on Crestor and previously LDL was 107.  We will check levels today to see if it needs to be increased.    In May A1c was 6.1.  She reports strong family history of diabetes.    She continues to have occupational and physical therapy and will be planning to have a 's evaluation in the future.    She does have knee arthritis and is thinking about having knee replacement surgery done.  Discussed  Plus she is in severe pain or her mobility is limited I would recommend waiting until next spring to do it.    Rash  Associated symptoms include a rash.   History of Present Illness       Mental Health Follow-up:  Patient presents to follow-up on Depression & Anxiety.Patient's depression since last visit has been:  Medium  The patient is not having other symptoms associated with depression.  Patient's anxiety since last visit has been:  Medium  The patient is not having other symptoms associated with anxiety.  Any significant life events:  health concerns  Patient is not feeling anxious or having panic attacks.  Patient has no concerns about alcohol or drug use.    Diabetes:   She presents for follow up of diabetes.  She is not checking blood glucose. She is concerned about other. She is not experiencing numbness or burning in feet, excessive thirst, blurry vision, weight changes or redness, sores or blisters on feet.         She eats 2-3 servings of fruits and vegetables daily.She consumes 1 sweetened beverage(s) daily.She exercises with enough effort to increase her heart rate 9 or less minutes per day.  She exercises with enough effort to increase her heart rate 3 or less days per week.   She is taking medications regularly.    Today's PHQ-9         PHQ-9 Total Score: 5    PHQ-9 Q9 Thoughts of better off dead/self-harm past 2 weeks :   Not at all    How difficult have these problems made it for you to do your work, take care of things at home, or get along with other people: Somewhat difficult  Today's HARDY-7 Score: 4       Review of Systems   Skin: Positive for rash.      Rash currently has resolved.  She denies any shortness of breath chest pains, no dizziness or falls.  Does have chronic diarrhea and previous history of collagenous colitis.  Usually she has 2-3 bowel movements a day which are frequently watery.  Bowel control has improved since she has been taking Metamucil.      Objective    /68 (BP Location: Left arm, Patient Position: Sitting, Cuff Size: Adult Regular)   Pulse 66   Wt 76.6 kg (168 lb 12.8 oz)   SpO2 98%   BMI 26.64 kg/m    There is no height or weight on file to calculate BMI.  Physical Exam       General: well appearing female, alert and oriented x3  EYES: Eyelids, conjunctiva, and sclera were normal. Pupils were normal.   HEAD, EARS, NOSE, MOUTH, AND THROAT: no cervical LAD, no thyromegaly or nodules appreciated. TMs are visualized and normal, oropharynx is clear, slightly crowded, she denies snoring at  night.  RESPIRATORY: respirations non labored, CTA bl, no wheezes, rales, no forced expiratory wheezing.  CARDIOVASCULAR: Heart rate and rhythm were normal. No murmurs, rubs,gallops. There was 1+ peripheral edema up to mid ankle. No carotid bruits.  GASTROINTESTINAL: Positive bowel sounds, abdomen is soft, non tender, non distended.     MUSCULOSKELETAL: Muscle mass was normal for age. No joint synovitis or deformity.  LYMPHATIC: There were no enlarged nodes palpable.  SKIN/HAIR/NAILS: Skin color was normal.  No rashes.  NEUROLOGIC: The patient was alert and oriented.  Speech was normal.  There is no facial asymmetry.   PSYCHIATRIC:  Mood and affect were normal.

## 2022-09-12 ENCOUNTER — HOSPITAL ENCOUNTER (INPATIENT)
Facility: CLINIC | Age: 82
Setting detail: SURGERY ADMIT
End: 2022-09-12
Attending: ORTHOPAEDIC SURGERY | Admitting: ORTHOPAEDIC SURGERY
Payer: MEDICARE

## 2022-10-20 VITALS — HEIGHT: 68 IN | WEIGHT: 164 LBS | BODY MASS INDEX: 24.86 KG/M2

## 2022-10-20 RX ORDER — CLOPIDOGREL BISULFATE 75 MG/1
75 TABLET ORAL DAILY
COMMUNITY
End: 2022-10-25 | Stop reason: HOSPADM

## 2022-10-20 RX ORDER — LOSARTAN POTASSIUM 50 MG/1
50 TABLET ORAL DAILY
COMMUNITY
End: 2022-10-25 | Stop reason: HOSPADM

## 2022-10-24 ENCOUNTER — TELEPHONE (OUTPATIENT)
Dept: INTERNAL MEDICINE | Facility: CLINIC | Age: 82
End: 2022-10-24

## 2022-10-24 ENCOUNTER — OFFICE VISIT (OUTPATIENT)
Dept: INTERNAL MEDICINE | Facility: CLINIC | Age: 82
End: 2022-10-24
Payer: MEDICARE

## 2022-10-24 VITALS
OXYGEN SATURATION: 98 % | BODY MASS INDEX: 25.48 KG/M2 | WEIGHT: 168.1 LBS | SYSTOLIC BLOOD PRESSURE: 120 MMHG | TEMPERATURE: 97.7 F | HEART RATE: 76 BPM | HEIGHT: 68 IN | DIASTOLIC BLOOD PRESSURE: 60 MMHG

## 2022-10-24 DIAGNOSIS — D50.9 IRON DEFICIENCY ANEMIA, UNSPECIFIED IRON DEFICIENCY ANEMIA TYPE: Primary | ICD-10-CM

## 2022-10-24 DIAGNOSIS — I10 PRIMARY HYPERTENSION: ICD-10-CM

## 2022-10-24 DIAGNOSIS — Z95.2 S/P TAVR (TRANSCATHETER AORTIC VALVE REPLACEMENT): ICD-10-CM

## 2022-10-24 DIAGNOSIS — Z01.818 PRE-OP EXAM: Primary | ICD-10-CM

## 2022-10-24 DIAGNOSIS — F32.5 MAJOR DEPRESSIVE DISORDER, SINGLE EPISODE IN FULL REMISSION (H): ICD-10-CM

## 2022-10-24 DIAGNOSIS — M17.11 ARTHRITIS OF RIGHT KNEE: ICD-10-CM

## 2022-10-24 DIAGNOSIS — D64.9 ANEMIA, UNSPECIFIED TYPE: ICD-10-CM

## 2022-10-24 DIAGNOSIS — I63.9 CEREBROVASCULAR ACCIDENT (CVA), UNSPECIFIED MECHANISM (H): ICD-10-CM

## 2022-10-24 LAB
ANION GAP SERPL CALCULATED.3IONS-SCNC: 11 MMOL/L (ref 7–15)
ATRIAL RATE - MUSE: 65 BPM
BASOPHILS # BLD AUTO: 0.1 10E3/UL (ref 0–0.2)
BASOPHILS NFR BLD AUTO: 1 %
BUN SERPL-MCNC: 26.1 MG/DL (ref 8–23)
CALCIUM SERPL-MCNC: 9.5 MG/DL (ref 8.8–10.2)
CHLORIDE SERPL-SCNC: 106 MMOL/L (ref 98–107)
CREAT SERPL-MCNC: 1.24 MG/DL (ref 0.51–0.95)
DEPRECATED HCO3 PLAS-SCNC: 23 MMOL/L (ref 22–29)
DIASTOLIC BLOOD PRESSURE - MUSE: NORMAL MMHG
EOSINOPHIL # BLD AUTO: 0.3 10E3/UL (ref 0–0.7)
EOSINOPHIL NFR BLD AUTO: 4 %
ERYTHROCYTE [DISTWIDTH] IN BLOOD BY AUTOMATED COUNT: 14 % (ref 10–15)
GFR SERPL CREATININE-BSD FRML MDRD: 44 ML/MIN/1.73M2
GLUCOSE SERPL-MCNC: 77 MG/DL (ref 70–99)
HCT VFR BLD AUTO: 27.6 % (ref 35–47)
HGB BLD-MCNC: 8.3 G/DL (ref 11.7–15.7)
IMM GRANULOCYTES # BLD: 0 10E3/UL
IMM GRANULOCYTES NFR BLD: 0 %
INTERPRETATION ECG - MUSE: NORMAL
IRON BINDING CAPACITY (ROCHE): 494 UG/DL (ref 240–430)
IRON SATN MFR SERPL: 4 % (ref 15–46)
IRON SERPL-MCNC: 20 UG/DL (ref 37–145)
LYMPHOCYTES # BLD AUTO: 1.4 10E3/UL (ref 0.8–5.3)
LYMPHOCYTES NFR BLD AUTO: 19 %
MCH RBC QN AUTO: 24.5 PG (ref 26.5–33)
MCHC RBC AUTO-ENTMCNC: 30.1 G/DL (ref 31.5–36.5)
MCV RBC AUTO: 81 FL (ref 78–100)
MONOCYTES # BLD AUTO: 0.6 10E3/UL (ref 0–1.3)
MONOCYTES NFR BLD AUTO: 9 %
NEUTROPHILS # BLD AUTO: 4.9 10E3/UL (ref 1.6–8.3)
NEUTROPHILS NFR BLD AUTO: 68 %
P AXIS - MUSE: 55 DEGREES
PLATELET # BLD AUTO: 233 10E3/UL (ref 150–450)
POTASSIUM SERPL-SCNC: 4.7 MMOL/L (ref 3.4–5.3)
PR INTERVAL - MUSE: 152 MS
QRS DURATION - MUSE: 84 MS
QT - MUSE: 410 MS
QTC - MUSE: 426 MS
R AXIS - MUSE: -29 DEGREES
RBC # BLD AUTO: 3.39 10E6/UL (ref 3.8–5.2)
SARS-COV-2 RNA RESP QL NAA+PROBE: NEGATIVE
SODIUM SERPL-SCNC: 140 MMOL/L (ref 136–145)
SYSTOLIC BLOOD PRESSURE - MUSE: NORMAL MMHG
T AXIS - MUSE: 59 DEGREES
VENTRICULAR RATE- MUSE: 65 BPM
VIT B12 SERPL-MCNC: 354 PG/ML (ref 232–1245)
WBC # BLD AUTO: 7.2 10E3/UL (ref 4–11)

## 2022-10-24 PROCEDURE — 82607 VITAMIN B-12: CPT | Performed by: INTERNAL MEDICINE

## 2022-10-24 PROCEDURE — 83540 ASSAY OF IRON: CPT | Performed by: INTERNAL MEDICINE

## 2022-10-24 PROCEDURE — 93010 ELECTROCARDIOGRAM REPORT: CPT | Mod: OFF | Performed by: INTERNAL MEDICINE

## 2022-10-24 PROCEDURE — 83550 IRON BINDING TEST: CPT | Performed by: INTERNAL MEDICINE

## 2022-10-24 PROCEDURE — U0005 INFEC AGEN DETEC AMPLI PROBE: HCPCS | Performed by: INTERNAL MEDICINE

## 2022-10-24 PROCEDURE — 85025 COMPLETE CBC W/AUTO DIFF WBC: CPT | Performed by: INTERNAL MEDICINE

## 2022-10-24 PROCEDURE — 99214 OFFICE O/P EST MOD 30 MIN: CPT | Performed by: INTERNAL MEDICINE

## 2022-10-24 PROCEDURE — 93005 ELECTROCARDIOGRAM TRACING: CPT | Performed by: INTERNAL MEDICINE

## 2022-10-24 PROCEDURE — 36415 COLL VENOUS BLD VENIPUNCTURE: CPT | Performed by: INTERNAL MEDICINE

## 2022-10-24 PROCEDURE — 80048 BASIC METABOLIC PNL TOTAL CA: CPT | Performed by: INTERNAL MEDICINE

## 2022-10-24 PROCEDURE — U0003 INFECTIOUS AGENT DETECTION BY NUCLEIC ACID (DNA OR RNA); SEVERE ACUTE RESPIRATORY SYNDROME CORONAVIRUS 2 (SARS-COV-2) (CORONAVIRUS DISEASE [COVID-19]), AMPLIFIED PROBE TECHNIQUE, MAKING USE OF HIGH THROUGHPUT TECHNOLOGIES AS DESCRIBED BY CMS-2020-01-R: HCPCS | Performed by: INTERNAL MEDICINE

## 2022-10-24 RX ORDER — PANTOPRAZOLE SODIUM 20 MG/1
20 TABLET, DELAYED RELEASE ORAL 2 TIMES DAILY
Qty: 60 TABLET | Refills: 1 | Status: SHIPPED | OUTPATIENT
Start: 2022-10-24 | End: 2023-02-03

## 2022-10-24 NOTE — PATIENT INSTRUCTIONS
Makes ure to notify cardiology about upcoming surgery. I would defer to them aspirin and Plavix recommendations pre-op.  Main clinic number: [phone]   Appointment scheduling or to be connected to your nurse: [phone]   Imaging/test [phone]     2. Morning of surgery O'K to take metoprolol and paroxetine, hold all other medications.    3. EKG and labs today ( will also get Covid screen).

## 2022-10-24 NOTE — TELEPHONE ENCOUNTER
Discussed results, recent lab tests.  Hemoglobin has been trending down.  In the last month it has fell by one-point and now is at 8.3. SUSpect stomach ulcer since worsening anemia started after TAVR surgery and when she was on aspirin and Plavix.      Currently discussed since her symptoms of knee pain are very mild, I would defer elective knee replacement surgery.    She will start on pantoprazole 20 mg twice a day 20 minutes before food.  Discussed scheduled endoscopy with [facility] (orders are in).  And have lab work done in 1 month.    1.Please call her surgeon's office and cancel knee replacement surgery for this Friday.    2. Please call [facility] and see how much longer post TAVR surgery she needs to be on both ASA and Plavix given worsening anemia. DR Izaguirre:  Main clinic number: [phone]   Appointment scheduling or to be connected to your nurse: [phone]

## 2022-10-24 NOTE — PROVIDER NOTIFICATION
Discharge plan according to [facility]:       10/20/22 0408   Discharge Planning   Patient/Family Anticipates Transition to home with family;home   Living Arrangements   People in Home spouse   Type of Residence Private Residence   Is your private residence a single family home or apartment? Single family home   Number of Stairs, Within Home, Primary ten   Stair Railings, Within Home, Primary railings safe and in good condition   Once home, are you able to live on one level? Yes   Which level? Main Level   Bathroom Shower/Tub Walk-in shower   Equipment Currently Used at Home raised toilet seat   Support System   Support Systems Spouse/Significant Other   Do you have someone available to stay with you one or two nights once you are home? Yes

## 2022-10-24 NOTE — PROGRESS NOTES
[facility]  [address] [place] [address]  Phone: [phone]  Fax: [phone]  Primary Provider: Dank Godoy  Pre-op Performing Provider: DANK GODOY      PREOPERATIVE EVALUATION:  Today's date: 10/24/2022    Ellen Felipe is a 82 year old female who presents for a preoperative evaluation.    Surgical Information:  Surgery/Procedure: Patellofemoral joint arthoplasty/possible total knee, right   Surgery Location: [facility]  Surgeon: Dr. Tisha Avery  Surgery Date: 10/28/22   Time of Surgery: 2:35 PM  Where patient plans to recover: At home with family  Fax number for surgical facility: Note does not need to be faxed, will be available electronically in Epic.    Type of Anesthesia Anticipated: Spinal    Assessment & Plan     The proposed surgical procedure is considered INTERMEDIATE risk.    Anemia.  Ellen has never been anemic in the past and developed mild anemia after her TAVR surgery.  After that hemoglobin was gradually getting lower but then stabilized and today appears to be a point to lower than a month ago.  Discussed with her recent health issues and dropping hemoglobin, I would like to delay her elective surgery until her hemoglobin is stable and improved.  She will cancel her surgery for now.  She will start on pantoprazole 20 mg twice a day.  I put an order for endoscopy in this preop exam can serve further clearance for that procedure.  I recommended that she follows up in 1 month to have repeat CBC done.  We will call her surgeons office to cancel her surgery and will contact her cardiologist to see how much longer she needs to be on both aspirin and Plavix.    Pre-op exam  After review off CBC results today with worsening anemia, recommend that patient postpones this elective surgery for mild arthritic pain in her left knee until we are able to stabilize her hemoglobin and figure out what is going on.    -  CBC with platelets and differential; Future  - Basic metabolic panel  (Ca, Cl, CO2, Creat, Gluc, K, Na, BUN); Future  - EKG 12-lead, tracing only  - Asymptomatic COVID-19 Virus (Coronavirus) by PCR; Future  - CBC with platelets and differential  - Basic metabolic panel  (Ca, Cl, CO2, Creat, Gluc, K, Na, BUN)  - Asymptomatic COVID-19 Virus (Coronavirus) by PCR Nose    Arthritis of right knee  Currently his pain is mild and not persistent.  -- EKG 12-lead, tracing only  - Asymptomatic COVID-19 Virus (Coronavirus) by PCR; Future  - CBC with platelets and differential  - Basic metabolic panel  (Ca, Cl, CO2, Creat, Gluc, K, Na, BUN)  - Asymptomatic COVID-19 Virus (Coronavirus) by PCR Nose    S/P TAVR (transcatheter aortic valve replacement)  This was done in June 2022.  She is on aspirin and Plavix, initially cardiology wanted her to stay on it until the end of December.  - EKG 12-lead, tracing only    Cerebrovascular accident (CVA), unspecified mechanism (H)  Due to severe aortic stenosis.  Currently she denies any neurological deficits but continues to work with speech, PT and OT.  Initially she did have mild aphasia.  - EKG 12-lead, tracing only    Primary hypertension  Well-controlled  - CBC with platelets and differential  - Basic metabolic panel  (Ca, Cl, CO2, Creat, Gluc, K, Na, BUN)    Major Depression, Single Episode In Remission  Stable    Anemia, unspecified type  As above  - CBC with platelets and differential; Future  - Iron and iron binding capacity; Future  - Vitamin B12; Future  - CBC with platelets and differential  - Iron and iron binding capacity  - Vitamin B12      RECOMMENDATION:  Patient is NOT cleared for elective knee replacement surgery at this time.  She IS cleared for endoscopy procedure.  6}    Subjective     HPI related to upcoming procedure:     Ellen is a 82 year old female with mild memory problems, anxiety, depression and OCD, high blood pressure, hyperlipidemia, aortic stenosis,  history of collagenous colitis and IBS, osteopenia, CRI.  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  Most recently she has had transcatheter aortic valve replacement on June 21 2022. She  is currently for a preop for elective knee replacement surgery.    Reports mild pain in her right knee on and off although recent x-ray done through orthopedist showed worsening arthritis.  Currently she denies any persistent or severe pain.    Previous surgical history significant for TAVR surgery in June, she denies any bleeding, clotting or anesthesia problems and no family history of such.    Since TAVR surgery she has been on aspirin and Plavix.  She denies any chest pain shortness of breath or swelling.  Blood pressure is well controlled on losartan and metoprolol in the morning and amlodipine in the evening.    She has history of depression and anxiety which is well controlled on current psychiatric medications.    No history of asthma, chronic shortness of breath or recent acute upper respiratory illnesses.    She did have stroke in May of this year and currently does speech, PT, OT and denies any persistent neurological deficits.    Preop Questions 10/24/2022   1. Have you ever had a heart attack or stroke? YES - Stroke in 05/2022   2. Have you ever had surgery on your heart or blood vessels, such as a stent placement, a coronary artery bypass, or surgery on an artery in your head, neck, heart, or legs? YES - TAVER in 06/2022   3. Do you have chest pain with activity? No   4. Do you have a history of  heart failure? No   5. Do you currently have a cold, bronchitis or symptoms of other infection? No   6. Do you have a cough, shortness of breath, or wheezing? No   7. Do you or anyone in your family have previous history of blood clots? UNKNOWN - Doesn't believe so   8. Do you or does anyone in your  family have a serious bleeding problem such as prolonged bleeding following surgeries or cuts? No   9. Have you ever had problems with anemia or been told to take iron pills? No   10. Have you had any abnormal blood loss such as black, tarry or bloody stools, or abnormal vaginal bleeding? No   11. Have you ever had a blood transfusion? No   12. Are you willing to have a blood transfusion if it is medically needed before, during, or after your surgery? Yes   13. Have you or any of your relatives ever had problems with anesthesia? UNKNOWN - Doesn't believe so   14. Do you have sleep apnea, excessive snoring or daytime drowsiness? No   15. Do you have any artifical heart valves or other implanted medical devices like a pacemaker, defibrillator, or continuous glucose monitor? YES - TAVR   15a. What type of device do you have? tavr   15b. Name of the clinic that manages your device:  [facility]   16. Do you have artificial joints? No   17. Are you allergic to latex? No       Health Care Directive:  Patient does not have a Health Care Directive or Living Will:Patient unsure if she has one.  Information given and she will discuss with .    Review of Systems  She denies any abdominal pain constipation diarrhea or blood in the stool.  No urinary symptoms.    Patient Active Problem List    Diagnosis Date Noted     Cerebrovascular accident (CVA), unspecified mechanism (H) 06/01/2022     Priority: Medium     History of UTI gram-negative bacteremia with encephalopathy 06/12/2019     Priority: Medium     Urinary tract infection without hematuria, site unspecified      Priority: Medium     Hyponatremia 06/06/2019     Priority: Medium     Hypokalemia 06/06/2019     Priority: Medium     Acute cystitis without hematuria 06/06/2019     Priority: Medium     Irritable bowel syndrome      Priority: Medium     Created by Conversion         Major Depression, Single Episode In Remission      Priority: Medium     Created by Conversion          Primary hypertension      Priority: Medium     Created by Conversion  Replacement Utility updated for latest IMO load         Hypercholesterolemia      Priority: Medium     Created by Conversion         Generalized anxiety disorder 09/28/2017     Priority: Medium     Collagenous colitis 05/02/2017     Priority: Medium     Biopsy proven 2017         Allergic Rhinitis      Priority: Medium     Created by Conversion  Replacement Utility updated for latest IMO load         Diverticulosis      Priority: Medium     Created by Conversion  Replacement Utility updated for latest IMO load         Osteopenia      Priority: Medium     Created by Conversion  Replacement Utility updated for latest IMO load         Herpes Simplex      Priority: Medium     Created by Conversion  Replacement Utility updated for latest IMO load         Aortic valve stenosis, etiology of cardiac valve disease unspecified 11/10/2015     Priority: Medium     With moderate regurgitation.          OCD (obsessive compulsive disorder) 04/10/2015     Priority: Medium     Benign Polyps Of The Large Intestine      Priority: Medium     Created by Conversion          Past Medical History:   Diagnosis Date     Aortic valve stenosis     TAVR 6/2022     Arthritis      Cerebral artery occlusion with cerebral infarction (H)      Depression      Essential hypertension     Created by Conversion  Replacement Utility updated for latest IMO load     Heart murmur      Hyperlipidemia      IBS (irritable bowel syndrome)      OCD (obsessive compulsive disorder) 04/10/2015     OCD (obsessive compulsive disorder)      Past Surgical History:   Procedure Laterality Date     AORTIC VALVE SURGERY  06/2022    TAVR     HC REMOVE TONSILS/ADENOIDS,<13 Y/O      Description: Tonsillectomy With Adenoidectomy;  Recorded: 05/27/2010;     Current Outpatient Medications   Medication Sig Dispense Refill     acetaminophen (TYLENOL) 325 MG tablet [ACETAMINOPHEN (TYLENOL) 325 MG TABLET] Take  "2 tablets (650 mg total) by mouth every 6 (six) hours as needed.  0     amLODIPine (NORVASC) 2.5 MG tablet TAKE 1 TABLET (2.5 MG) BY MOUTH DAILY 90 tablet 11     amoxicillin (AMOXIL) 500 MG capsule [AMOXICILLIN (AMOXIL) 500 MG CAPSULE] Take 2,000 mg by mouth see administration instructions. Take one hour prior to dental appointments as directed.       aspirin (ASA) 81 MG EC tablet Take 1 tablet (81 mg) by mouth daily       clopidogrel (PLAVIX) 75 MG tablet Take 75 mg by mouth daily       losartan (COZAAR) 50 MG tablet Take 50 mg by mouth daily       metoprolol succinate ER (TOPROL XL) 100 MG 24 hr tablet Take 1 tablet (100 mg) by mouth daily       PARoxetine (PAXIL) 10 MG tablet Take 2 tablets (20 mg) by mouth 2 times daily 180 tablet 3     rosuvastatin (CRESTOR) 10 MG tablet Take 1 tablet (10 mg) by mouth daily 90 tablet 3       Allergies   Allergen Reactions     Hydrochlorothiazide Other (See Comments)     Hyponatremia, hypokalemia     Sertraline Diarrhea     Bactrim [Sulfamethoxazole W/Trimethoprim] Hives and Rash        Social History     Tobacco Use     Smoking status: Never     Smokeless tobacco: Never   Substance Use Topics     Alcohol use: Yes     Alcohol/week: 0.8 standard drinks     Comment: Alcoholic Drinks/day: 1 glass of red wine       History   Drug Use No         Objective     /60 (BP Location: Left arm, Patient Position: Left side, Cuff Size: Adult Regular)   Pulse 76   Temp 97.7  F (36.5  C) (Tympanic)   Ht 1.715 m (5' 7.5\")   Wt 76.2 kg (168 lb 1.6 oz)   SpO2 98%   BMI 25.94 kg/m      Physical Exam  General: well appearing female, alert and oriented x3  EYES: Eyelids, conjunctiva, and sclera were normal. Pupils were normal.   HEAD, EARS, NOSE, MOUTH, AND THROAT: no cervical LAD, no thyromegaly or nodules appreciated. TMs are visualized and normal, oropharynx is clear.  RESPIRATORY: respirations non labored, CTA bl, no wheezes, rales, no forced expiratory wheezing.  CARDIOVASCULAR: Heart " rate and rhythm were normal. No murmurs, rubs,gallops. There was no peripheral edema. No carotid bruits.  GASTROINTESTINAL: Positive bowel sounds, abdomen is soft, non tender, non distended.     MUSCULOSKELETAL: Muscle mass was normal for age. No joint synovitis or deformity.  LYMPHATIC: There were no enlarged nodes palpable.  SKIN/HAIR/NAILS: Skin color was normal.  No rashes.  NEUROLOGIC: The patient was alert and oriented.  Speech was normal.  There is no facial asymmetry.   PSYCHIATRIC:  Mood and affect were normal.         Recent Labs   Lab Test 09/02/22  1041 07/08/22  1242   HGB 9.9* 9.7*    247    140   POTASSIUM 4.3 4.4   CR 1.26* 1.23*   A1C 5.6  --         Diagnostics:  Labs are ordered and some of them are still pending  EKG does not show any new changes.    Revised Cardiac Risk Index (RCRI):  The patient has the following serious cardiovascular risks for perioperative complications:   - Cerebrovascular Disease (TIA or CVA) = 1 point     RCRI Interpretation: 1 point: Class II (low risk - 0.9% complication rate)           Signed Electronically by: Brenda Godoy MD  Copy of this evaluation report is provided to requesting physician.

## 2022-10-24 NOTE — PROGRESS NOTES
I am evaluating this patient for upcoming Right Patellofemoral Joint Arthroplasty, possible Right Total Knee Arthroplasty with Dr. Avery at [facility] on 10/28/22:    - Patient called with some questions about upcoming surgery which I answered. She had preop H&P at [facility] today (10/24/22). She has history of recent CVA in the past few months and notes indicate ok from Neurology standpoint to proceed with knee surgery however patient also had transcatheter aortic valve replacement (TAVR) in June 2022 and is still on dual antiplatelet therapy (DAPT) with both Plavix and ASA 81 mg. We typically ask for 7 day hold of Plavix before this surgery if ok with Cardiology. Patient started last took Plavix on 10/21/22, then started holding it for surgery. She took ASA 81 through 10/23/22, then started holding it. I left message with patient's Cardiology clinic (Dr. Izaguirre at [facility]) today to clarify whether patient is ok from Cardiology standpoint to proceed with knee surgery and also if ok to hold Plavix for 7 days before surgery? Also wondering if ok to hold ASA 81 or should patient remain on it perioperatively? Waiting for response. Call me below if any questions.     UMANG Hernandez, CNP   Advanced Practice Nurse Navigator- Orthopedics  [facility]   Office Phone: [phone]  Direct Fax: [phone]

## 2022-10-26 ENCOUNTER — TELEPHONE (OUTPATIENT)
Dept: INTERNAL MEDICINE | Facility: CLINIC | Age: 82
End: 2022-10-26

## 2022-10-26 DIAGNOSIS — D50.0 IRON DEFICIENCY ANEMIA DUE TO CHRONIC BLOOD LOSS: Primary | ICD-10-CM

## 2022-10-26 RX ORDER — DIPHENHYDRAMINE HYDROCHLORIDE 50 MG/ML
50 INJECTION INTRAMUSCULAR; INTRAVENOUS
Status: CANCELLED
Start: 2022-10-27

## 2022-10-26 RX ORDER — EPINEPHRINE 1 MG/ML
0.3 INJECTION, SOLUTION, CONCENTRATE INTRAVENOUS EVERY 5 MIN PRN
Status: CANCELLED | OUTPATIENT
Start: 2022-10-27

## 2022-10-26 RX ORDER — ALBUTEROL SULFATE 90 UG/1
1-2 AEROSOL, METERED RESPIRATORY (INHALATION)
Status: CANCELLED
Start: 2022-10-27

## 2022-10-26 RX ORDER — HEPARIN SODIUM (PORCINE) LOCK FLUSH IV SOLN 100 UNIT/ML 100 UNIT/ML
5 SOLUTION INTRAVENOUS
Status: CANCELLED | OUTPATIENT
Start: 2022-10-27

## 2022-10-26 RX ORDER — MEPERIDINE HYDROCHLORIDE 25 MG/ML
25 INJECTION INTRAMUSCULAR; INTRAVENOUS; SUBCUTANEOUS EVERY 30 MIN PRN
Status: CANCELLED | OUTPATIENT
Start: 2022-10-27

## 2022-10-26 RX ORDER — HEPARIN SODIUM,PORCINE 10 UNIT/ML
5 VIAL (ML) INTRAVENOUS
Status: CANCELLED | OUTPATIENT
Start: 2022-10-27

## 2022-10-26 RX ORDER — METHYLPREDNISOLONE SODIUM SUCCINATE 125 MG/2ML
125 INJECTION, POWDER, LYOPHILIZED, FOR SOLUTION INTRAMUSCULAR; INTRAVENOUS
Status: CANCELLED
Start: 2022-10-27

## 2022-10-26 RX ORDER — ALBUTEROL SULFATE 0.83 MG/ML
2.5 SOLUTION RESPIRATORY (INHALATION)
Status: CANCELLED | OUTPATIENT
Start: 2022-10-27

## 2022-10-26 NOTE — TELEPHONE ENCOUNTER
Spoke with pt.  Anemia is worse, Iron level is low.  Due to h/o IBS and GI sx discussed IV iron infusion (Feraheme once weekly for 2 doses).    Katrin, I put Iron order in. Could you check that I did it correctly?  Does anything else needs to be done/faxed? Or does it go directly to infusion center?    Thank you!  NH

## 2022-10-27 NOTE — TELEPHONE ENCOUNTER
You set this up perfectly. Now that the therapy plan is in there, the team will check insurance and call to schedule the infusions. You should not have to do anything else.

## 2022-11-07 ENCOUNTER — TELEPHONE (OUTPATIENT)
Dept: INTERNAL MEDICINE | Facility: CLINIC | Age: 82
End: 2022-11-07

## 2022-11-07 NOTE — TELEPHONE ENCOUNTER
Reason for Call:  Other call back    Detailed comments: PT IS LOOKING FOR A CALL BACK T UNDERSTAND SOME OF THE PROCEDURES AND LAB TESTS SHE WILL HAVE SOON.    Phone Number Patient can be reached at: Cell number on file:    Telephone Information:   Mobile [phone]       Best Time: ANYTIME    Can we leave a detailed message on this number? YES    Call taken on 11/7/2022 at 1:32 PM by Natasha Giordano

## 2022-11-08 NOTE — TELEPHONE ENCOUNTER
Attempted to call     [facility] for information from Dr. Miki Izaguirre MD    [facility]  [address]  [facility], [address]  [place] [address]  Get directions  [phone]      Dr. Dmitriy Trivedi's nurse for the day. Left message for RN to call back clinic.

## 2022-11-11 ENCOUNTER — TELEPHONE (OUTPATIENT)
Dept: INTERNAL MEDICINE | Facility: CLINIC | Age: 82
End: 2022-11-11

## 2022-11-11 DIAGNOSIS — D50.0 IRON DEFICIENCY ANEMIA DUE TO CHRONIC BLOOD LOSS: Primary | ICD-10-CM

## 2022-11-11 RX ORDER — FERROUS SULFATE 325(65) MG
325 TABLET ORAL
Qty: 90 TABLET | Refills: 1 | Status: SHIPPED | OUTPATIENT
Start: 2022-11-11 | End: 2023-04-20

## 2022-11-11 NOTE — TELEPHONE ENCOUNTER
Spoke with patient and relayed information below from Dr Godoy. Patient verbalized understanding and states she does sometimes forget to take the pantoprazole at dinner but will try to set herself a reminder. She has no further questions.

## 2022-11-11 NOTE — TELEPHONE ENCOUNTER
Pt was recently seen for anemia.  Omeprazole prescribed 20 mg twice a day before meals. Please make sure pt is compliant.    I also would like her to take iron supplement with lunch: ferrous sulfate 325 mg a day.It's over the counter, but I did sent Rx to the pharmacy [facility] drug as well.    We'll see how her counts are on the follow up lab draw.    Dr MARQUIS

## 2022-11-14 ENCOUNTER — TELEPHONE (OUTPATIENT)
Dept: INTERNAL MEDICINE | Facility: CLINIC | Age: 82
End: 2022-11-14

## 2022-11-14 DIAGNOSIS — D50.0 IRON DEFICIENCY ANEMIA DUE TO CHRONIC BLOOD LOSS: Primary | ICD-10-CM

## 2022-11-14 RX ORDER — METHYLPREDNISOLONE SODIUM SUCCINATE 125 MG/2ML
125 INJECTION, POWDER, LYOPHILIZED, FOR SOLUTION INTRAMUSCULAR; INTRAVENOUS
Status: CANCELLED
Start: 2022-11-15

## 2022-11-14 RX ORDER — EPINEPHRINE 1 MG/ML
0.3 INJECTION, SOLUTION, CONCENTRATE INTRAVENOUS EVERY 5 MIN PRN
Status: CANCELLED | OUTPATIENT
Start: 2022-11-15

## 2022-11-14 RX ORDER — ALBUTEROL SULFATE 0.83 MG/ML
2.5 SOLUTION RESPIRATORY (INHALATION)
Status: CANCELLED | OUTPATIENT
Start: 2022-11-15

## 2022-11-14 RX ORDER — HEPARIN SODIUM,PORCINE 10 UNIT/ML
5 VIAL (ML) INTRAVENOUS
Status: CANCELLED | OUTPATIENT
Start: 2022-11-15

## 2022-11-14 RX ORDER — ALBUTEROL SULFATE 90 UG/1
1-2 AEROSOL, METERED RESPIRATORY (INHALATION)
Status: CANCELLED
Start: 2022-11-15

## 2022-11-14 RX ORDER — DIPHENHYDRAMINE HYDROCHLORIDE 50 MG/ML
50 INJECTION INTRAMUSCULAR; INTRAVENOUS
Status: CANCELLED
Start: 2022-11-15

## 2022-11-14 RX ORDER — HEPARIN SODIUM (PORCINE) LOCK FLUSH IV SOLN 100 UNIT/ML 100 UNIT/ML
5 SOLUTION INTRAVENOUS
Status: CANCELLED | OUTPATIENT
Start: 2022-11-15

## 2022-11-22 ENCOUNTER — TELEPHONE (OUTPATIENT)
Dept: INTERNAL MEDICINE | Facility: CLINIC | Age: 82
End: 2022-11-22

## 2022-11-22 ENCOUNTER — HOSPITAL ENCOUNTER (EMERGENCY)
Facility: HOSPITAL | Age: 82
Discharge: HOME | End: 2022-11-22
Attending: STUDENT IN AN ORGANIZED HEALTH CARE EDUCATION/TRAINING PROGRAM | Admitting: STUDENT IN AN ORGANIZED HEALTH CARE EDUCATION/TRAINING PROGRAM
Payer: MEDICARE

## 2022-11-22 ENCOUNTER — INFUSION THERAPY VISIT (OUTPATIENT)
Dept: INFUSION THERAPY | Facility: HOSPITAL | Age: 82
End: 2022-11-22
Attending: INTERNAL MEDICINE
Payer: MEDICARE

## 2022-11-22 VITALS
TEMPERATURE: 97.7 F | DIASTOLIC BLOOD PRESSURE: 45 MMHG | RESPIRATION RATE: 16 BRPM | HEART RATE: 61 BPM | SYSTOLIC BLOOD PRESSURE: 79 MMHG | OXYGEN SATURATION: 88 %

## 2022-11-22 VITALS
SYSTOLIC BLOOD PRESSURE: 94 MMHG | RESPIRATION RATE: 14 BRPM | OXYGEN SATURATION: 96 % | HEIGHT: 68 IN | HEART RATE: 61 BPM | TEMPERATURE: 97.8 F | BODY MASS INDEX: 25.76 KG/M2 | DIASTOLIC BLOOD PRESSURE: 52 MMHG | WEIGHT: 170 LBS

## 2022-11-22 DIAGNOSIS — D50.0 IRON DEFICIENCY ANEMIA DUE TO CHRONIC BLOOD LOSS: Primary | ICD-10-CM

## 2022-11-22 DIAGNOSIS — T78.40XA ALLERGIC REACTION, INITIAL ENCOUNTER: ICD-10-CM

## 2022-11-22 LAB
ALBUMIN SERPL BCG-MCNC: 4.1 G/DL (ref 3.5–5.2)
ALP SERPL-CCNC: 93 U/L (ref 35–104)
ALT SERPL W P-5'-P-CCNC: 19 U/L (ref 10–35)
ANION GAP SERPL CALCULATED.3IONS-SCNC: 11 MMOL/L (ref 7–15)
AST SERPL W P-5'-P-CCNC: 29 U/L (ref 10–35)
BASOPHILS # BLD MANUAL: 0 10E3/UL (ref 0–0.2)
BASOPHILS NFR BLD MANUAL: 1 %
BILIRUB SERPL-MCNC: 0.2 MG/DL
BUN SERPL-MCNC: 25.7 MG/DL (ref 8–23)
CALCIUM SERPL-MCNC: 9.2 MG/DL (ref 8.8–10.2)
CHLORIDE SERPL-SCNC: 107 MMOL/L (ref 98–107)
CREAT SERPL-MCNC: 1.3 MG/DL (ref 0.51–0.95)
DEPRECATED HCO3 PLAS-SCNC: 21 MMOL/L (ref 22–29)
EOSINOPHIL # BLD MANUAL: 0 10E3/UL (ref 0–0.7)
EOSINOPHIL NFR BLD MANUAL: 1 %
ERYTHROCYTE [DISTWIDTH] IN BLOOD BY AUTOMATED COUNT: 15.5 % (ref 10–15)
GFR SERPL CREATININE-BSD FRML MDRD: 41 ML/MIN/1.73M2
GLUCOSE SERPL-MCNC: 126 MG/DL (ref 70–99)
HCT VFR BLD AUTO: 32.9 % (ref 35–47)
HGB BLD-MCNC: 9.8 G/DL (ref 11.7–15.7)
HOLD SPECIMEN: NORMAL
HOLD SPECIMEN: NORMAL
LYMPHOCYTES # BLD MANUAL: 1.3 10E3/UL (ref 0.8–5.3)
LYMPHOCYTES NFR BLD MANUAL: 61 %
MCH RBC QN AUTO: 23.6 PG (ref 26.5–33)
MCHC RBC AUTO-ENTMCNC: 29.8 G/DL (ref 31.5–36.5)
MCV RBC AUTO: 79 FL (ref 78–100)
METAMYELOCYTES # BLD MANUAL: 0.1 10E3/UL
METAMYELOCYTES NFR BLD MANUAL: 6 %
MONOCYTES # BLD MANUAL: 0 10E3/UL (ref 0–1.3)
MONOCYTES NFR BLD MANUAL: 2 %
MYELOCYTES # BLD MANUAL: 0 10E3/UL
MYELOCYTES NFR BLD MANUAL: 2 %
NEUTROPHILS # BLD MANUAL: 0.6 10E3/UL (ref 1.6–8.3)
NEUTROPHILS NFR BLD MANUAL: 27 %
NRBC # BLD AUTO: 0 10E3/UL
NRBC BLD MANUAL-RTO: 1 %
PLAT MORPH BLD: ABNORMAL
PLATELET # BLD AUTO: 224 10E3/UL (ref 150–450)
POTASSIUM SERPL-SCNC: 4.8 MMOL/L (ref 3.4–5.3)
PROT SERPL-MCNC: 6.9 G/DL (ref 6.4–8.3)
RBC # BLD AUTO: 4.15 10E6/UL (ref 3.8–5.2)
RBC MORPH BLD: ABNORMAL
SODIUM SERPL-SCNC: 139 MMOL/L (ref 136–145)
TROPONIN T SERPL HS-MCNC: 37 NG/L
TROPONIN T SERPL HS-MCNC: 43 NG/L
WBC # BLD AUTO: 2.1 10E3/UL (ref 4–11)

## 2022-11-22 PROCEDURE — 96374 THER/PROPH/DIAG INJ IV PUSH: CPT

## 2022-11-22 PROCEDURE — 85027 COMPLETE CBC AUTOMATED: CPT | Performed by: STUDENT IN AN ORGANIZED HEALTH CARE EDUCATION/TRAINING PROGRAM

## 2022-11-22 PROCEDURE — 84484 ASSAY OF TROPONIN QUANT: CPT | Mod: 91 | Performed by: STUDENT IN AN ORGANIZED HEALTH CARE EDUCATION/TRAINING PROGRAM

## 2022-11-22 PROCEDURE — 96365 THER/PROPH/DIAG IV INF INIT: CPT

## 2022-11-22 PROCEDURE — 258N000003 HC RX IP 258 OP 636: Performed by: STUDENT IN AN ORGANIZED HEALTH CARE EDUCATION/TRAINING PROGRAM

## 2022-11-22 PROCEDURE — 80053 COMPREHEN METABOLIC PANEL: CPT | Performed by: STUDENT IN AN ORGANIZED HEALTH CARE EDUCATION/TRAINING PROGRAM

## 2022-11-22 PROCEDURE — 258N000003 HC RX IP 258 OP 636: Performed by: INTERNAL MEDICINE

## 2022-11-22 PROCEDURE — 250N000011 HC RX IP 250 OP 636: Performed by: INTERNAL MEDICINE

## 2022-11-22 PROCEDURE — 85007 BL SMEAR W/DIFF WBC COUNT: CPT | Performed by: STUDENT IN AN ORGANIZED HEALTH CARE EDUCATION/TRAINING PROGRAM

## 2022-11-22 PROCEDURE — 96375 TX/PRO/DX INJ NEW DRUG ADDON: CPT

## 2022-11-22 PROCEDURE — 250N000011 HC RX IP 250 OP 636: Performed by: STUDENT IN AN ORGANIZED HEALTH CARE EDUCATION/TRAINING PROGRAM

## 2022-11-22 PROCEDURE — 250N000009 HC RX 250: Performed by: STUDENT IN AN ORGANIZED HEALTH CARE EDUCATION/TRAINING PROGRAM

## 2022-11-22 PROCEDURE — 36415 COLL VENOUS BLD VENIPUNCTURE: CPT | Performed by: STUDENT IN AN ORGANIZED HEALTH CARE EDUCATION/TRAINING PROGRAM

## 2022-11-22 PROCEDURE — 84484 ASSAY OF TROPONIN QUANT: CPT | Performed by: STUDENT IN AN ORGANIZED HEALTH CARE EDUCATION/TRAINING PROGRAM

## 2022-11-22 PROCEDURE — 99291 CRITICAL CARE FIRST HOUR: CPT | Mod: 25

## 2022-11-22 PROCEDURE — 93005 ELECTROCARDIOGRAM TRACING: CPT | Performed by: STUDENT IN AN ORGANIZED HEALTH CARE EDUCATION/TRAINING PROGRAM

## 2022-11-22 PROCEDURE — 96361 HYDRATE IV INFUSION ADD-ON: CPT

## 2022-11-22 RX ORDER — ONDANSETRON 2 MG/ML
4 INJECTION INTRAMUSCULAR; INTRAVENOUS ONCE
Status: COMPLETED | OUTPATIENT
Start: 2022-11-22 | End: 2022-11-22

## 2022-11-22 RX ORDER — EPINEPHRINE 1 MG/ML
0.3 INJECTION, SOLUTION INTRAMUSCULAR; SUBCUTANEOUS EVERY 5 MIN PRN
Status: DISCONTINUED | OUTPATIENT
Start: 2022-11-22 | End: 2022-11-22

## 2022-11-22 RX ORDER — ALBUTEROL SULFATE 90 UG/1
1-2 AEROSOL, METERED RESPIRATORY (INHALATION)
Status: DISCONTINUED | OUTPATIENT
Start: 2022-11-22 | End: 2022-11-22

## 2022-11-22 RX ORDER — METHYLPREDNISOLONE SODIUM SUCCINATE 125 MG/2ML
125 INJECTION, POWDER, LYOPHILIZED, FOR SOLUTION INTRAMUSCULAR; INTRAVENOUS
Status: CANCELLED
Start: 2022-11-22

## 2022-11-22 RX ORDER — DIPHENHYDRAMINE HYDROCHLORIDE 50 MG/ML
50 INJECTION INTRAMUSCULAR; INTRAVENOUS
Status: CANCELLED
Start: 2022-11-22

## 2022-11-22 RX ORDER — DIPHENHYDRAMINE HYDROCHLORIDE 50 MG/ML
50 INJECTION INTRAMUSCULAR; INTRAVENOUS
Status: COMPLETED | OUTPATIENT
Start: 2022-11-22 | End: 2022-11-22

## 2022-11-22 RX ORDER — METHYLPREDNISOLONE SODIUM SUCCINATE 125 MG/2ML
125 INJECTION, POWDER, LYOPHILIZED, FOR SOLUTION INTRAMUSCULAR; INTRAVENOUS
Status: DISCONTINUED | OUTPATIENT
Start: 2022-11-22 | End: 2022-11-22

## 2022-11-22 RX ORDER — HEPARIN SODIUM (PORCINE) LOCK FLUSH IV SOLN 100 UNIT/ML 100 UNIT/ML
5 SOLUTION INTRAVENOUS
Status: CANCELLED | OUTPATIENT
Start: 2022-11-22

## 2022-11-22 RX ORDER — HEPARIN SODIUM,PORCINE 10 UNIT/ML
5 VIAL (ML) INTRAVENOUS
Status: CANCELLED | OUTPATIENT
Start: 2022-11-22

## 2022-11-22 RX ORDER — ALBUTEROL SULFATE 0.83 MG/ML
2.5 SOLUTION RESPIRATORY (INHALATION)
Status: DISCONTINUED | OUTPATIENT
Start: 2022-11-22 | End: 2022-11-22

## 2022-11-22 RX ORDER — ALBUTEROL SULFATE 0.83 MG/ML
2.5 SOLUTION RESPIRATORY (INHALATION)
Status: CANCELLED | OUTPATIENT
Start: 2022-11-22

## 2022-11-22 RX ORDER — PREDNISONE 20 MG/1
60 TABLET ORAL DAILY
Qty: 12 TABLET | Refills: 0 | Status: SHIPPED | OUTPATIENT
Start: 2022-11-22 | End: 2022-11-26

## 2022-11-22 RX ORDER — EPINEPHRINE 1 MG/ML
0.3 INJECTION, SOLUTION INTRAMUSCULAR; SUBCUTANEOUS EVERY 5 MIN PRN
Status: CANCELLED | OUTPATIENT
Start: 2022-11-22

## 2022-11-22 RX ORDER — ALBUTEROL SULFATE 90 UG/1
1-2 AEROSOL, METERED RESPIRATORY (INHALATION)
Status: CANCELLED
Start: 2022-11-22

## 2022-11-22 RX ORDER — METHYLPREDNISOLONE SODIUM SUCCINATE 125 MG/2ML
125 INJECTION, POWDER, LYOPHILIZED, FOR SOLUTION INTRAMUSCULAR; INTRAVENOUS
Status: COMPLETED | OUTPATIENT
Start: 2022-11-22 | End: 2022-11-22

## 2022-11-22 RX ADMIN — METHYLPREDNISOLONE SODIUM SUCCINATE 125 MG: 125 INJECTION, POWDER, FOR SOLUTION INTRAMUSCULAR; INTRAVENOUS at 09:35

## 2022-11-22 RX ADMIN — SODIUM CHLORIDE 250 ML: 9 INJECTION, SOLUTION INTRAVENOUS at 08:53

## 2022-11-22 RX ADMIN — SODIUM CHLORIDE 25 MG: 9 INJECTION, SOLUTION INTRAVENOUS at 08:54

## 2022-11-22 RX ADMIN — ONDANSETRON 4 MG: 2 INJECTION INTRAMUSCULAR; INTRAVENOUS at 10:02

## 2022-11-22 RX ADMIN — SODIUM CHLORIDE, POTASSIUM CHLORIDE, SODIUM LACTATE AND CALCIUM CHLORIDE 1000 ML: 600; 310; 30; 20 INJECTION, SOLUTION INTRAVENOUS at 09:30

## 2022-11-22 RX ADMIN — FAMOTIDINE 20 MG: 10 INJECTION, SOLUTION INTRAVENOUS at 09:37

## 2022-11-22 RX ADMIN — EPINEPHRINE 0.3 MG: 1 INJECTION INTRAMUSCULAR; INTRAVENOUS; SUBCUTANEOUS at 09:35

## 2022-11-22 RX ADMIN — DIPHENHYDRAMINE HYDROCHLORIDE 50 MG: 50 INJECTION, SOLUTION INTRAMUSCULAR; INTRAVENOUS at 09:15

## 2022-11-22 ASSESSMENT — ACTIVITIES OF DAILY LIVING (ADL)
ADLS_ACUITY_SCORE: 35
ADLS_ACUITY_SCORE: 35

## 2022-11-22 NOTE — PROGRESS NOTES
Infusion Nursing Note:  Ellen Felipe presents today for Iron Dextran Infusion.    Patient seen by provider today: Yes: In ER   present during visit today: Not Applicable.    Note: Meghan comes in today for Iron Dextran. Meghan has only taken oral Iron in the past. I went over the plan of care and information on the medication. We went over the side effects and when to notify any of us nurses if she was having a reaction or just feeling anything different. Meghan verbalized understanding. PIV placed on second attempt with great blood return throughout. Test dose of Iron dextran hung per protocol and when the patient was 1/2 way through her dose about 25cc/12mg Meghan started itching her head and then the palm of her hands. Iron Dextran stopped and normal saline bolus started. As I grabbed benadryl and another nurse started to get vitals the patient became obtunded and a Rapid response was called. Before the team came Benadryl was administered and  BP was 79/45 and sats were 88%. Patient was quickly put on a stretcher and brought down to the ER. I followed the patient to the ER and gave report to the ER doctor and Cain the RN. We called her  Lucio and updated him on what happen and that Meghan was currently in the ER. Lucio was heading to the ER.    Intravenous Access:  Peripheral IV placed.    Treatment Conditions:  Not Applicable.    Post Infusion Assessment:  Patient tolerated infusion poorly due to : Hypersensitivity: Did patient have a hypersensitivity reaction? : Yes  Drug or Product name: Iron Dextran  Were pre-meds administered?: No     First or Subsequent treatment: First time receiving  Rate of infusion when patient had hypersensitivity reaction: 194  Time the hypersensitivity reaction was first recognized: 0910  Symptoms observed or reported (select all that apply): Itching;Hypotension;Hypoxia;Other: (Comment) (lips swollen)  Interventions/treatment following reaction: Infusion  stopped;Hypersensitivity medications administered;Oxygen applied;Rapid Response called;Therapy discontinued;Other: (Comment) (transfered to ED)  What hypersensitivity medications were administered?: DiphendydrAMINE (benadryl);NaCl 0.9% Bolus  Name of provider notified: Dr. Godoy  Time provider notified: 1540  Type of notification (select all that apply): Paged/Phone (spoke to Alexandra Caicedo RN of Dr. Godoy)  Was the patient re-challenged today?: No - no re-challenge today (went to ER).     Discharge Plan:   Meghan had an anaphylactic reaction to the iron dextran. After hypersensitivity medications and a rapid response call Meghan was D/C'd to the ER for further evaluation and treatment.      RAY BARRIENTOS RN

## 2022-11-22 NOTE — TELEPHONE ENCOUNTER
Pt received 1/2 IV dextron test dose, about 12.5 mg, she had an allergic response, spO2 into 70s, lips/tongue swollen, itchy all over. A rapid was called and patient was sent to the ED.     Therapy plan has been discontinued.     EVY routed to provider.

## 2022-11-22 NOTE — ED TRIAGE NOTES
Patient to ER via cart from infusion center after rapid response called. Patient at infusion center for test run of iron dextrane given. Patient became hypotensive with swelling at tongue and lips. Benadryl given at  Infusion center and transported to  ER>    On arrival, patient sedate, will open eyes to name. MD in room Epi, Pepcid and solumedrol given. Is on NRB at 100% with sats at 98%. Blood pressure per report was 70/. [id number]/. pateitn gradually becoming alert, tongue less swollen. Nauseated, small emesis,  zofran given. Lungs anteriorly clear.      Triage Assessment     Row Name 11/22/22 1004       Triage Assessment (Adult)    Airway WDL X    Additional Documentation Edema (Group)       Respiratory WDL    Respiratory WDL X       Edema    Edema tongue    Tongue Edema 3+ (Moderate);2+ (Mild)       Cognitive/Neuro/Behavioral WDL    Cognitive/Neuro/Behavioral WDL WDL

## 2022-11-22 NOTE — ED NOTES
Pt is sleeping without respiratory distress. On 2 L with 100% sat. Blood Pressure remains 90's (systolic).

## 2022-11-22 NOTE — PATIENT INSTRUCTIONS
Call with any questions or concerns. [facility] 1st floor infusion [phone] option #2

## 2022-11-22 NOTE — ED PROVIDER NOTES
Emergency Department Encounter         FINAL IMPRESSION:  Allergic reaction        ED COURSE AND MEDICAL DECISION MAKING   9:39 AM I introduced myself to the patient, obtained patient history, performed a physical exam, and discussed plan for ED workup including potential diagnostic laboratory/imaging studies and interventions.  12:00 PM I rechecked the patient and updated them on laboratory and imaging results.    ED Course as of 11/22/22 1338   Tue Nov 22, 2022   1008 Patient's an 82-year-old female here from the infusion center after allergic reaction to a half dose of iron dextran.  Patient was getting a test dose today which according to the infusion  service is approximately 25 mg, patient began having a reaction approximately retirement through that so approximately received 12.5 mg.  Patient had sudden onset head itching and facial swelling.  Received 50 of IV Benadryl and brought down here emergently.  On arrival patient was tired however was answering questions appropriately including wiggling her toes and giving her thumbs up on both sides.  Opening her eyes.  Oral examination revealing a mildly swollen tongue with a normal uvula.  Her lips are mildly swollen externally.  Lungs are clear.  Abdomen benign.  No hives appreciated.  Plan for allergy cocktail, fluids basic labs and reevaluate.   1030 Repeat evaluation of patient showing resting comfortably.   1030 EKG sinus rhythm 61 left anterior fascicular block, no STEMI, no inversions otherwise, no depressions, no STEMI criteria  unchanged from previous       -Patient was observed for almost 5 hours here in the department with significant improvement of her symptoms.  She tolerated p.o.  Multiple reevaluations of patient showing her resting comfortably.  Symptom with 4 more days of steroids.  Gave return precautions.  Allergies updated on her chart.  -Repeat troponin under the threshold for admission/NSTEMI.              Medical Decision  Making    History:    Supplemental history from: Family Member/Significant Other    External Record(s) reviewed: Documented in HPI, if applicable.    Work Up:    Chart documentation includes differential considered and any EKGs or imaging interpreted by provider.    In additional to work up documented, I considered the following work up: See chart documentation, if applicable.    External consultation:    Discussion of management with another provider: See chart documentation, if applicable    Complicating factors:    Care impacted by chronic illness: None    Care affected by social determinants of health: N/A    Disposition considerations: Discharge. I prescribed additional prescription strength medication(s) as charted. I did not consider admission.                  EKG      At the conclusion of the encounter I discussed the results of all the tests and the disposition. The questions were answered. The patient or family acknowledged understanding and was agreeable with the care plan.                  MEDICATIONS GIVEN IN THE EMERGENCY DEPARTMENT:  Medications   EPINEPHrine (ADRENALIN) kit 0.3 mg (0.3 mg Intramuscular Given 11/22/22 0935)   famotidine (PEPCID) injection 20 mg (20 mg Intravenous Given 11/22/22 0937)   methylPREDNISolone sodium succinate (solu-MEDROL) injection 125 mg (125 mg Intravenous Given 11/22/22 0935)   lactated ringers BOLUS 1,000 mL (0 mLs Intravenous Stopped 11/22/22 1035)   ondansetron (ZOFRAN) injection 4 mg (4 mg Intravenous Given 11/22/22 1002)       NEW PRESCRIPTIONS STARTED AT TODAY'S ED VISIT:  New Prescriptions    No medications on file       [facility]     Patient information obtained from: Patient     Use of Interpretor: N/A     Ellen Felipe is a 82 year old female with no contributory medical history who presents to this ED via walk for evaluation of allergic reaction.     The patient presents to the ED from the infusion center after an allergic reaction to half a dose of iron  dextran. She was getting a test dose today which according to the infusion service is approximately 25 mg, but began having a reaction approximately half-way through that, so she approximately received 12.5 mg.  Patient had sudden onset head itching and facial swelling.  Received 50 of IV Benadryl and brought down here emergently. On arrival patient was tired, but was answering questions appropriately including wiggling her toes and giving her thumbs up on both sides.      REVIEW OF SYSTEMS:  Review of Systems   Constitutional: Negative for fever, malaise  HEENT: Negative runny nose, sore throat, ear pain, neck pain. Positive for head itching and facial swelling.   Respiratory: Negative for shortness of breath, cough, congestion  Cardiovascular: Negative for chest pain, leg edema  Gastrointestinal: Negative for abdominal distention, abdominal pain, constipation, vomiting, nausea, diarrhea  Genitourinary: Negative for dysuria and hematuria.   Integument: Negative for rash, skin breakdown.   Neurological: Negative for paresthesias, weakness, headache.  Musculoskeletal: Negative for joint pain, joint swelling      All other systems reviewed and are negative.          MEDICAL HISTORY     Past Medical History:   Diagnosis Date     Aortic valve stenosis      Arthritis      Cerebral artery occlusion with cerebral infarction (H)      Depression      Essential hypertension      Heart murmur      Hyperlipidemia      IBS (irritable bowel syndrome)      Iron deficiency anemia due to chronic blood loss 10/26/2022     OCD (obsessive compulsive disorder) 04/10/2015     OCD (obsessive compulsive disorder)        Past Surgical History:   Procedure Laterality Date     AORTIC VALVE SURGERY  06/2022    TAVR     HC REMOVE TONSILS/ADENOIDS,<13 Y/O      Description: Tonsillectomy With Adenoidectomy;  Recorded: 05/27/2010;       Social History     Tobacco Use     Smoking status: Never     Smokeless tobacco: Never   Substance Use Topics      "Alcohol use: Yes     Alcohol/week: 0.8 standard drinks     Comment: Alcoholic Drinks/day: 1 glass of red wine     Drug use: No       acetaminophen (TYLENOL) 325 MG tablet  amLODIPine (NORVASC) 2.5 MG tablet  amoxicillin (AMOXIL) 500 MG capsule  aspirin (ASA) 81 MG EC tablet  ferrous sulfate (FEROSUL) 325 (65 Fe) MG tablet  metoprolol succinate ER (TOPROL XL) 100 MG 24 hr tablet  pantoprazole (PROTONIX) 20 MG EC tablet  PARoxetine (PAXIL) 10 MG tablet  rosuvastatin (CRESTOR) 10 MG tablet            PHYSICAL EXAM     BP (!) 80/49   Pulse 63   Temp 97.8  F (36.6  C)   Resp 12   Ht 1.727 m (5' 8\")   Wt 77.1 kg (170 lb)   SpO2 97%   BMI 25.85 kg/m        PHYSICAL EXAM:     General: Patient appears well, nontoxic, comfortable  HEENT: Moist mucous membranes,  No head trauma.  No midline neck pain. Mildly swollen tongue with a normal uvula. Lips are mildly swollen externally.   Cardiovascular: Normal rate, normal rhythm, no extremity edema.  No appreciable murmur.  Respiratory: No signs of respiratory distress, lungs are clear to auscultation bilaterally with no wheezes rhonchi or rales.  Abdominal: Soft, nontender, nondistended, no palpable masses, no guarding, no rebound  Musculoskeletal: Full range of motion of joints, no deformities appreciated.  Neurological: Alert and oriented, grossly neurologically intact.  Psychological: Normal affect and mood.  Integument: No rashes appreciated. No hives appreciated.           RESULTS       Labs Ordered and Resulted from Time of ED Arrival to Time of ED Departure   COMPREHENSIVE METABOLIC PANEL - Abnormal       Result Value    Sodium 139      Potassium 4.8      Chloride 107      Carbon Dioxide (CO2) 21 (*)     Anion Gap 11      Urea Nitrogen 25.7 (*)     Creatinine 1.30 (*)     Calcium 9.2      Glucose 126 (*)     Alkaline Phosphatase 93      AST 29      ALT 19      Protein Total 6.9      Albumin 4.1      Bilirubin Total 0.2      GFR Estimate 41 (*)    TROPONIN T, HIGH " SENSITIVITY - Abnormal    Troponin T, High Sensitivity 37 (*)    CBC WITH PLATELETS AND DIFFERENTIAL - Abnormal    WBC Count 2.1 (*)     RBC Count 4.15      Hemoglobin 9.8 (*)     Hematocrit 32.9 (*)     MCV 79      MCH 23.6 (*)     MCHC 29.8 (*)     RDW 15.5 (*)     Platelet Count 224     TROPONIN T, HIGH SENSITIVITY - Abnormal    Troponin T, High Sensitivity 43 (*)    DIFFERENTIAL - Abnormal    % Neutrophils 27      % Lymphocytes 61      % Monocytes 2      % Eosinophils 1      % Basophils 1      % Metamyelocytes 6      % Myelocytes 2      NRBCs per 100 WBC 1 (*)     Absolute Neutrophils 0.6 (*)     Absolute Lymphocytes 1.3      Absolute Monocytes 0.0      Absolute Eosinophils 0.0      Absolute Basophils 0.0      Absolute Metamyelocytes 0.1 (*)     Absolute Myelocytes 0.0      Absolute NRBCs 0.0      RBC Morphology Confirmed RBC Indices      Platelet Assessment        Value: Automated Count Confirmed. Platelet morphology is normal.       No orders to display                     PROCEDURES:  Procedures:  Procedures       I, Michael Agarwal am serving as a scribe to document services personally performed by Juan Rey DO, based on my observations and the provider's statements to me.  I, Juan Rey DO, attest that Michael Agarwal is acting in a scribe capacity, has observed my performance of the services and has documented them in accordance with my direction.    Juan Rey DO  Emergency Medicine  [facility] EMERGENCY DEPARTMENT     Juan Rey DO  11/23/22 4038

## 2022-11-22 NOTE — ED NOTES
Swelling at face and tongue improved.  No SOB. To be discharged. Somewhat unsteady on feet.  Will discharge  via wheelchair.  bringing car around

## 2022-11-27 ENCOUNTER — OFFICE VISIT (OUTPATIENT)
Dept: URGENT CARE | Facility: URGENT CARE | Age: 82
End: 2022-11-27
Payer: MEDICARE

## 2022-11-27 VITALS
WEIGHT: 170 LBS | BODY MASS INDEX: 25.85 KG/M2 | TEMPERATURE: 97.8 F | SYSTOLIC BLOOD PRESSURE: 94 MMHG | OXYGEN SATURATION: 100 % | DIASTOLIC BLOOD PRESSURE: 68 MMHG | HEART RATE: 56 BPM

## 2022-11-27 DIAGNOSIS — N30.00 ACUTE CYSTITIS WITHOUT HEMATURIA: Primary | ICD-10-CM

## 2022-11-27 DIAGNOSIS — Z87.898 HISTORY OF BACTEREMIA: ICD-10-CM

## 2022-11-27 DIAGNOSIS — R30.0 DYSURIA: ICD-10-CM

## 2022-11-27 PROBLEM — I47.29 NSVT (NONSUSTAINED VENTRICULAR TACHYCARDIA) (H): Status: ACTIVE | Noted: 2022-06-22

## 2022-11-27 PROBLEM — R47.01 APHASIA: Status: ACTIVE | Noted: 2022-05-18

## 2022-11-27 PROBLEM — D64.9 ANEMIA: Status: ACTIVE | Noted: 2022-05-18

## 2022-11-27 LAB
ALBUMIN UR-MCNC: NEGATIVE MG/DL
APPEARANCE UR: CLEAR
BACTERIA #/AREA URNS HPF: ABNORMAL /HPF
BILIRUB UR QL STRIP: NEGATIVE
COLOR UR AUTO: YELLOW
GLUCOSE UR STRIP-MCNC: NEGATIVE MG/DL
HGB UR QL STRIP: NEGATIVE
KETONES UR STRIP-MCNC: NEGATIVE MG/DL
LEUKOCYTE ESTERASE UR QL STRIP: ABNORMAL
NITRATE UR QL: POSITIVE
PH UR STRIP: 6.5 [PH] (ref 5–7)
RBC #/AREA URNS AUTO: ABNORMAL /HPF
SP GR UR STRIP: 1.01 (ref 1–1.03)
UROBILINOGEN UR STRIP-ACNC: 0.2 E.U./DL
WBC #/AREA URNS AUTO: ABNORMAL /HPF
WBC CLUMPS #/AREA URNS HPF: PRESENT /HPF

## 2022-11-27 PROCEDURE — 99204 OFFICE O/P NEW MOD 45 MIN: CPT | Performed by: PHYSICIAN ASSISTANT

## 2022-11-27 PROCEDURE — 87086 URINE CULTURE/COLONY COUNT: CPT | Performed by: PHYSICIAN ASSISTANT

## 2022-11-27 PROCEDURE — 87186 SC STD MICRODIL/AGAR DIL: CPT | Performed by: PHYSICIAN ASSISTANT

## 2022-11-27 PROCEDURE — 81001 URINALYSIS AUTO W/SCOPE: CPT | Performed by: PHYSICIAN ASSISTANT

## 2022-11-27 RX ORDER — CEFDINIR 300 MG/1
300 CAPSULE ORAL 2 TIMES DAILY
Qty: 10 CAPSULE | Refills: 0 | Status: SHIPPED | OUTPATIENT
Start: 2022-11-27 | End: 2022-11-27

## 2022-11-27 RX ORDER — CEFDINIR 300 MG/1
300 CAPSULE ORAL 2 TIMES DAILY
Qty: 10 CAPSULE | Refills: 0 | Status: SHIPPED | OUTPATIENT
Start: 2022-11-27 | End: 2023-01-20

## 2022-11-28 ENCOUNTER — TELEPHONE (OUTPATIENT)
Dept: INTERNAL MEDICINE | Facility: CLINIC | Age: 82
End: 2022-11-28

## 2022-11-28 NOTE — TELEPHONE ENCOUNTER
General Call      Reason for Call:  Neuropscych eval was completed at [facility]    What are your questions or concerns:  Date 10/26/22 full report is completed-please look in care everywhere and refresh.    She has already meant and feedback and recommendations as well-just wanted     Please call Monique Zhao if any questions from [facility]-main line [phone]

## 2022-11-28 NOTE — PROGRESS NOTES
Chief Complaint   Patient presents with     Urgent Care     Pt in clinic to have eval for urinary frequency.       ASSESSMENT/PLAN:  Ellen was seen today for urgent care.    Diagnoses and all orders for this visit:    Acute cystitis without hematuria  -     Discontinue: cefdinir (OMNICEF) 300 MG capsule; Take 1 capsule (300 mg) by mouth 2 times daily for 5 days  -     cefdinir (OMNICEF) 300 MG capsule; Take 1 capsule (300 mg) by mouth 2 times daily    Dysuria  -     UA macro with reflex to Microscopic and Culture - Clinc Collect  -     Urine Microscopic Exam  -     Urine Culture    History of UTI gram-negative bacteremia with encephalopathy    UA consistent with UTI.  No signs of ascending infection.  Start with cefdinir given kidney function and age.    Feng Sandra PA-C      SUBJECTIVE:  Ellen is a 82 year old female who presents to urgent care with urinary frequency, some slight incontinence since yesterday.  No nausea, vomiting, abdominal pain, back pain..    ROS: Pertinent ROS neg other than the symptoms noted above in the HPI.     OBJECTIVE:  BP 94/68   Pulse 56   Temp 97.8  F (36.6  C) (Temporal)   Wt 77.1 kg (170 lb)   SpO2 100%   BMI 25.85 kg/m     GENERAL: healthy, alert and no distress  BACK: no CVA tenderness, no paralumbar tenderness    DIAGNOSTICS    Results for orders placed or performed in visit on 11/27/22   UA macro with reflex to Microscopic and Culture - Clinc Collect     Status: Abnormal    Specimen: Urine, Clean Catch   Result Value Ref Range    Color Urine Yellow Colorless, Straw, Light Yellow, Yellow    Appearance Urine Clear Clear    Glucose Urine Negative Negative mg/dL    Bilirubin Urine Negative Negative    Ketones Urine Negative Negative mg/dL    Specific Gravity Urine 1.015 1.003 - 1.035    Blood Urine Negative Negative    pH Urine 6.5 5.0 - 7.0    Protein Albumin Urine Negative Negative mg/dL    Urobilinogen Urine 0.2 0.2, 1.0 E.U./dL    Nitrite Urine Positive (A)  Negative    Leukocyte Esterase Urine Trace (A) Negative   Urine Microscopic Exam     Status: Abnormal   Result Value Ref Range    Bacteria Urine Many (A) None Seen /HPF    RBC Urine None Seen 0-2 /HPF /HPF    WBC Urine 5-10 (A) 0-5 /HPF /HPF    WBC Clumps Urine Present (A) None Seen /HPF        Current Outpatient Medications   Medication     acetaminophen (TYLENOL) 325 MG tablet     amLODIPine (NORVASC) 2.5 MG tablet     amoxicillin (AMOXIL) 500 MG capsule     aspirin (ASA) 81 MG EC tablet     ferrous sulfate (FEROSUL) 325 (65 Fe) MG tablet     metoprolol succinate ER (TOPROL XL) 100 MG 24 hr tablet     pantoprazole (PROTONIX) 20 MG EC tablet     PARoxetine (PAXIL) 10 MG tablet     rosuvastatin (CRESTOR) 10 MG tablet     No current facility-administered medications for this visit.      Patient Active Problem List   Diagnosis     Benign Polyps Of The Large Intestine     Allergic Rhinitis     Diverticulosis     Osteopenia     Herpes Simplex     Hypercholesterolemia     Irritable bowel syndrome     Major Depression, Single Episode In Remission     Primary hypertension     OCD (obsessive compulsive disorder)     Aortic valve stenosis, etiology of cardiac valve disease unspecified     Collagenous colitis     Generalized anxiety disorder     Hyponatremia     Hypokalemia     Acute cystitis without hematuria     Urinary tract infection without hematuria, site unspecified     History of UTI gram-negative bacteremia with encephalopathy     Cerebrovascular accident (CVA), unspecified mechanism (H)     Iron deficiency anemia due to chronic blood loss      Past Medical History:   Diagnosis Date     Aortic valve stenosis     TAVR 6/2022     Arthritis      Cerebral artery occlusion with cerebral infarction (H)      Depression      Essential hypertension     Created by Conversion  Replacement Utility updated for latest IMO load     Heart murmur      Hyperlipidemia      IBS (irritable bowel syndrome)      Iron deficiency anemia due  to chronic blood loss 10/26/2022     OCD (obsessive compulsive disorder) 04/10/2015     OCD (obsessive compulsive disorder)      Past Surgical History:   Procedure Laterality Date     AORTIC VALVE SURGERY  2022    TAVR     HC REMOVE TONSILS/ADENOIDS,<11 Y/O      Description: Tonsillectomy With Adenoidectomy;  Recorded: 2010;     Family History   Problem Relation Age of Onset     Heart Failure Mother          age 96     Heart Failure Father          age 85     Diabetes Type 2  Sister      Diabetes Type 2  Brother      Diabetes Type 2  Brother      Social History     Tobacco Use     Smoking status: Never     Smokeless tobacco: Never   Substance Use Topics     Alcohol use: Yes     Alcohol/week: 0.8 standard drinks     Comment: Alcoholic Drinks/day: 1 glass of red wine              The plan of care was discussed with the patient. They understand and agree with the course of treatment prescribed. A printed summary was given including instructions and medications.  The use of Dragon/ClosetDash dictation services may have been used to construct the content in this note; any grammatical or spelling errors are non-intentional. Please contact the author of this note directly if you are in need of any clarification.

## 2022-11-29 LAB — BACTERIA UR CULT: ABNORMAL

## 2022-12-02 ENCOUNTER — OFFICE VISIT (OUTPATIENT)
Dept: INTERNAL MEDICINE | Facility: CLINIC | Age: 82
End: 2022-12-02
Payer: MEDICARE

## 2022-12-02 VITALS — DIASTOLIC BLOOD PRESSURE: 60 MMHG | SYSTOLIC BLOOD PRESSURE: 120 MMHG

## 2022-12-02 DIAGNOSIS — Z95.2 S/P TAVR (TRANSCATHETER AORTIC VALVE REPLACEMENT): ICD-10-CM

## 2022-12-02 DIAGNOSIS — T50.905S ADVERSE EFFECT OF DRUG, SEQUELA: Primary | ICD-10-CM

## 2022-12-02 DIAGNOSIS — I10 PRIMARY HYPERTENSION: ICD-10-CM

## 2022-12-02 DIAGNOSIS — I63.9 CEREBROVASCULAR ACCIDENT (CVA), UNSPECIFIED MECHANISM (H): ICD-10-CM

## 2022-12-02 DIAGNOSIS — D50.9 IRON DEFICIENCY ANEMIA, UNSPECIFIED IRON DEFICIENCY ANEMIA TYPE: ICD-10-CM

## 2022-12-02 DIAGNOSIS — N30.00 ACUTE CYSTITIS WITHOUT HEMATURIA: ICD-10-CM

## 2022-12-02 PROCEDURE — 99214 OFFICE O/P EST MOD 30 MIN: CPT | Performed by: INTERNAL MEDICINE

## 2022-12-02 NOTE — PROGRESS NOTES
Assessment & Plan     Adverse effect of drug, sequela  She did have anaphylactic reaction to IV dextran.  Currently she is tolerating oral iron without any side effects and will continue for the next 2 months.    Iron deficiency anemia, unspecified iron deficiency anemia type  Etiology of worsening anemia is unclear and could be multifactorial.  Initially she developed anemia after her valve replacement surgery but subsequently it has gotten worse, possibly due to aspirin and Plavix therapy.  Currently she is on pantoprazole and iron supplement.  She has schedule endoscopy for February and is apprehensive whether it needs to be done.  Discussed that we will follow-up again in 6 weeks, if hemoglobin is much improved, then we can cancel endoscopy but if not, she will need to have endoscopy done to make sure there is no voicing ulcer in her stomach that needs to be cauterized.  She denies any black stools but she is on iron supplement  - CBC with platelets and differential; Future  - Iron and iron binding capacity; Future    Primary hypertension  Blood pressure is well controlled on amlodipine and metoprolol    Acute cystitis without hematuria  She is finishing cefdinir.  Discussed that we do not need to repeat urinalysis today but in the future if she develops recurrent symptoms she can schedule a lab appointment.  - UA with Microscopic reflex to Culture - lab collect; Future    Cerebrovascular accident (CVA), unspecified mechanism (H)  Due to severe aortic stenosis, arm currently no significant residual side effects, her aphasia has improved.  Mood has been stable.    S/P TAVR (transcatheter aortic valve replacement)  She is supposed to stay on both aspirin and Plavix until December.    Right knee arthritis.  Currently its not bothering her and she denies any pain or significant instability.  Discussed that it is okay to delay surgery until she develops more symptoms.    BMI:   Estimated body mass index is 25.85  "kg/m  as calculated from the following:    Height as of 11/22/22: 1.727 m (5' 8\").    Weight as of 11/27/22: 77.1 kg (170 lb).     Return in about 7 weeks (around 1/20/2023).    Brenda Godoy MD  [facility]    Dara Christianson is a 82 year old, presenting for the following health issues:  Hospital F/U (r) and Recheck Medication (Pt reports that she was admitted at [facility] to replace her heart valve, and having a stroke)      NOLA Hughes is a 82 year old female with mild memory problems, anxiety, depression and OCD, high blood pressure, hyperlipidemia, aortic stenosis, history of collagenous colitis and IBS, osteopenia, CRI.  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  Most recently she has had transcatheter aortic valve replacement on June 21 2022.     She is currently here to follow-up from recent emergency room visit.    Patient had transcatheter aortic valve replacement in June and since then has been on aspirin and Plavix.  She did have mild anemia with hemoglobin around 10.4 postoperatively but it continued to worsen and in October came down to 8.3.  Since Ellen has chronic GI issues and intermittent diarrhea we decided to start IV iron infusions for her and schedule her for endoscopy.    She had infusion on November 22 and FDC through the dose of iron and dextran she developed itchiness, facial swelling, she was given Benadryl and was sent to the emergency room.  She has improved with supportive treatment and currently is taking iron oral pill twice a day without any side effect.    Currently she denies any abdominal pain, constipation diarrhea or blood in the stool and will need to stay on aspirin and Plavix until December after which Plavix can be stopped.    She also developed urinary symptoms on November 27 and was seen in " emergency room.  Urine culture grew sensitive E. coli at small concentration and she was given prescription for cefdinir.  She is almost finished with that and has 1 more pill.  Symptoms have improved but she is very anxious whether she needs to repeat urine again today.  She does not get urinary infections very often.    Review of Systems   As above      Objective    /60   There is no height or weight on file to calculate BMI.  Physical Exam   General: well appearing female, alert and oriented x3  EYES: Eyelids, conjunctiva, and sclera were normal. Pupils were normal.   HEAD, EARS, NOSE, MOUTH, AND THROAT: no cervical LAD, no thyromegaly or nodules appreciated. TMs are visualized and normal, oropharynx is clear.  RESPIRATORY: respirations non labored, CTA bl, no wheezes, rales, no forced expiratory wheezing.  CARDIOVASCULAR: Heart rate and rhythm were normal. No murmurs, rubs,gallops. There was no peripheral edema. .  GASTROINTESTINAL: Positive bowel sounds, abdomen is soft, non tender, non distended.     MUSCULOSKELETAL: Muscle mass was normal for age. No joint synovitis or deformity.  LYMPHATIC: There were no enlarged nodes palpable.  SKIN/HAIR/NAILS: Skin color was normal.  No rashes.  NEUROLOGIC: The patient was alert and oriented.  Speech was normal.  There is no facial asymmetry.   PSYCHIATRIC:  Mood and affect were normal.

## 2022-12-02 NOTE — PATIENT INSTRUCTIONS
Continue Iron supplement 2ce a day and repeat lab work in 2 months (orders placed)    2. For Urinary tract infection: finish antibiotic, make sure to probiotics , D-mannose supplement daily to help prevent future UTI.

## 2022-12-09 ENCOUNTER — ANCILLARY PROCEDURE (OUTPATIENT)
Dept: MAMMOGRAPHY | Facility: CLINIC | Age: 82
End: 2022-12-09
Attending: INTERNAL MEDICINE
Payer: MEDICARE

## 2022-12-09 ENCOUNTER — ANCILLARY PROCEDURE (OUTPATIENT)
Dept: BONE DENSITY | Facility: CLINIC | Age: 82
End: 2022-12-09
Attending: INTERNAL MEDICINE
Payer: MEDICARE

## 2022-12-09 DIAGNOSIS — Z78.0 POST-MENOPAUSAL: ICD-10-CM

## 2022-12-09 DIAGNOSIS — Z12.31 ENCOUNTER FOR SCREENING MAMMOGRAM FOR BREAST CANCER: ICD-10-CM

## 2022-12-09 PROCEDURE — 77080 DXA BONE DENSITY AXIAL: CPT | Performed by: INTERNAL MEDICINE

## 2022-12-09 PROCEDURE — 77067 SCR MAMMO BI INCL CAD: CPT | Mod: TC | Performed by: RADIOLOGY

## 2022-12-09 PROCEDURE — 77063 BREAST TOMOSYNTHESIS BI: CPT | Mod: TC | Performed by: RADIOLOGY

## 2023-01-20 ENCOUNTER — OFFICE VISIT (OUTPATIENT)
Dept: INTERNAL MEDICINE | Facility: CLINIC | Age: 83
End: 2023-01-20
Payer: MEDICARE

## 2023-01-20 VITALS
BODY MASS INDEX: 25.11 KG/M2 | OXYGEN SATURATION: 98 % | RESPIRATION RATE: 14 BRPM | HEIGHT: 68 IN | HEART RATE: 60 BPM | WEIGHT: 165.7 LBS | DIASTOLIC BLOOD PRESSURE: 70 MMHG | SYSTOLIC BLOOD PRESSURE: 120 MMHG | TEMPERATURE: 98.4 F

## 2023-01-20 DIAGNOSIS — Z95.2 S/P TAVR (TRANSCATHETER AORTIC VALVE REPLACEMENT): ICD-10-CM

## 2023-01-20 DIAGNOSIS — Z00.00 ENCOUNTER FOR MEDICARE ANNUAL WELLNESS EXAM: Primary | ICD-10-CM

## 2023-01-20 DIAGNOSIS — K92.1 TARRY STOOL: ICD-10-CM

## 2023-01-20 DIAGNOSIS — Z12.11 ENCOUNTER FOR SCREENING FOR MALIGNANT NEOPLASM OF COLON: ICD-10-CM

## 2023-01-20 DIAGNOSIS — F32.5 MAJOR DEPRESSIVE DISORDER, SINGLE EPISODE IN FULL REMISSION (H): ICD-10-CM

## 2023-01-20 DIAGNOSIS — I10 ESSENTIAL HYPERTENSION: ICD-10-CM

## 2023-01-20 DIAGNOSIS — I63.9 CEREBROVASCULAR ACCIDENT (CVA), UNSPECIFIED MECHANISM (H): ICD-10-CM

## 2023-01-20 DIAGNOSIS — D50.0 IRON DEFICIENCY ANEMIA DUE TO CHRONIC BLOOD LOSS: ICD-10-CM

## 2023-01-20 DIAGNOSIS — E55.9 VITAMIN D DEFICIENCY: ICD-10-CM

## 2023-01-20 LAB
ANION GAP SERPL CALCULATED.3IONS-SCNC: 13 MMOL/L (ref 7–15)
BASOPHILS # BLD AUTO: 0.1 10E3/UL (ref 0–0.2)
BASOPHILS NFR BLD AUTO: 1 %
BUN SERPL-MCNC: 21.3 MG/DL (ref 8–23)
CALCIUM SERPL-MCNC: 9.3 MG/DL (ref 8.8–10.2)
CHLORIDE SERPL-SCNC: 108 MMOL/L (ref 98–107)
CREAT SERPL-MCNC: 1.17 MG/DL (ref 0.51–0.95)
DEPRECATED HCO3 PLAS-SCNC: 21 MMOL/L (ref 22–29)
EOSINOPHIL # BLD AUTO: 0.1 10E3/UL (ref 0–0.7)
EOSINOPHIL NFR BLD AUTO: 2 %
ERYTHROCYTE [DISTWIDTH] IN BLOOD BY AUTOMATED COUNT: 21.9 % (ref 10–15)
GFR SERPL CREATININE-BSD FRML MDRD: 47 ML/MIN/1.73M2
GLUCOSE SERPL-MCNC: 89 MG/DL (ref 70–99)
HCT VFR BLD AUTO: 32.7 % (ref 35–47)
HGB BLD-MCNC: 9.9 G/DL (ref 11.7–15.7)
IMM GRANULOCYTES # BLD: 0 10E3/UL
IMM GRANULOCYTES NFR BLD: 0 %
IRON BINDING CAPACITY (ROCHE): 475 UG/DL (ref 240–430)
IRON SATN MFR SERPL: 16 % (ref 15–46)
IRON SERPL-MCNC: 75 UG/DL (ref 37–145)
LYMPHOCYTES # BLD AUTO: 1.4 10E3/UL (ref 0.8–5.3)
LYMPHOCYTES NFR BLD AUTO: 23 %
MCH RBC QN AUTO: 23.9 PG (ref 26.5–33)
MCHC RBC AUTO-ENTMCNC: 30.3 G/DL (ref 31.5–36.5)
MCV RBC AUTO: 79 FL (ref 78–100)
MONOCYTES # BLD AUTO: 0.6 10E3/UL (ref 0–1.3)
MONOCYTES NFR BLD AUTO: 10 %
NEUTROPHILS # BLD AUTO: 3.9 10E3/UL (ref 1.6–8.3)
NEUTROPHILS NFR BLD AUTO: 64 %
PLATELET # BLD AUTO: 224 10E3/UL (ref 150–450)
POTASSIUM SERPL-SCNC: 4.5 MMOL/L (ref 3.4–5.3)
RBC # BLD AUTO: 4.14 10E6/UL (ref 3.8–5.2)
SODIUM SERPL-SCNC: 142 MMOL/L (ref 136–145)
WBC # BLD AUTO: 6.1 10E3/UL (ref 4–11)

## 2023-01-20 PROCEDURE — 36415 COLL VENOUS BLD VENIPUNCTURE: CPT | Performed by: INTERNAL MEDICINE

## 2023-01-20 PROCEDURE — 83540 ASSAY OF IRON: CPT | Performed by: INTERNAL MEDICINE

## 2023-01-20 PROCEDURE — 80048 BASIC METABOLIC PNL TOTAL CA: CPT | Performed by: INTERNAL MEDICINE

## 2023-01-20 PROCEDURE — 85025 COMPLETE CBC W/AUTO DIFF WBC: CPT | Performed by: INTERNAL MEDICINE

## 2023-01-20 PROCEDURE — 83550 IRON BINDING TEST: CPT | Performed by: INTERNAL MEDICINE

## 2023-01-20 PROCEDURE — G0439 PPPS, SUBSEQ VISIT: HCPCS | Performed by: INTERNAL MEDICINE

## 2023-01-20 RX ORDER — FERROUS SULFATE 325(65) MG
325 TABLET ORAL
Qty: 90 TABLET | Refills: 1 | Status: CANCELLED | OUTPATIENT
Start: 2023-01-20

## 2023-01-20 ASSESSMENT — ENCOUNTER SYMPTOMS
FREQUENCY: 0
MYALGIAS: 0
PARESTHESIAS: 0
SORE THROAT: 0
HEMATURIA: 0
WEAKNESS: 0
FEVER: 0
ABDOMINAL PAIN: 0
NAUSEA: 0
JOINT SWELLING: 0
SHORTNESS OF BREATH: 0
BREAST MASS: 0
EYE PAIN: 0
HEMATOCHEZIA: 0
ARTHRALGIAS: 0
DIARRHEA: 1
CONSTIPATION: 0
HEADACHES: 0
CHILLS: 0
DYSURIA: 0
PALPITATIONS: 0
HEARTBURN: 0
DIZZINESS: 0
NERVOUS/ANXIOUS: 1
COUGH: 0

## 2023-01-20 ASSESSMENT — ACTIVITIES OF DAILY LIVING (ADL)
CURRENT_FUNCTION: TRANSPORTATION REQUIRES ASSISTANCE
CURRENT_FUNCTION: NO ASSISTANCE NEEDED

## 2023-01-20 NOTE — PROGRESS NOTES
SUBJECTIVE:   Meghan is a 82 year old who presents for Preventive Visit.    Ellen is a 82 year old female with mild memory problems, anxiety, depression and OCD, high blood pressure, hyperlipidemia, aortic stenosis, history of collagenous colitis and IBS, osteopenia, CRI.  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  Most recently she has had transcatheter aortic valve replacement on June 21 2022.  She is currently here for wellness exam.    Since her TAVR surgery she has been on aspirin and Plavix and continues to have chronic moderate anemia which has not improved.  Previously she has taking iron for several weeks but stopped after she ran out of her bottle.  She has not been taking omeprazole regularly because forgets to take it before her dinner.  She denies any abdominal pain but has been having dark stools, unclear if they are black or very dark brown.  She has scheduled endoscopy for February.    Blood pressure is well controlled on metoprolol and amlodipine.    She and her  are getting ready to move into her apartment at her care home [place] and will be selling their house.  She is excited about it.  They are currently packing.    She did have neurocognitive evaluation in October since she has had stroke.  They did note some deficiencies and recommended no driving and no complex decision-making like medical decision making and financial paperwork.  She does have appointment with [facility] to check her for driving further.    She wears glasses and have not seen her ophthalmologist recently and will set up appointment in the near future.    Hearing has been good.    She denies any recent falls or balance issues.      Healthy Habits:     In general, how would you rate your overall health?  Good    Frequency of exercise:  1 day/week    Duration of  "exercise:  15-30 minutes    Do you usually eat at least 4 servings of fruit and vegetables a day, include whole grains    & fiber and avoid regularly eating high fat or \"junk\" foods?  No    Taking medications regularly:  Yes    Medication side effects:  None    Ability to successfully perform activities of daily living:  Transportation requires assistance and no assistance needed    Home Safety:  No safety concerns identified    Hearing Impairment:  No hearing concerns    In the past 6 months, have you been bothered by leaking of urine?  No    In general, how would you rate your overall mental or emotional health?  Good      PHQ-2 Total Score: 1    Additional concerns today:  No    Cognitive Screening   1) Repeat 3 items (Leader, Season, Table)    2) Clock draw: NORMAL  3) 3 item recall: Recalls 2 objects   Results: NORMAL clock, 1-2 items recalled: COGNITIVE IMPAIRMENT LESS LIKELY    Mini-CogTM Copyright S Bailey. Licensed by the author for use in [facility]; reprinted with permission (arianna@[facility]). All rights reserved.      Do you have sleep apnea, excessive snoring or daytime drowsiness?: no    Reviewed and updated as needed this visit by clinical staff   Tobacco  Allergies  Meds              Reviewed and updated as needed this visit by Provider                 Social History     Tobacco Use     Smoking status: Never     Smokeless tobacco: Never   Substance Use Topics     Alcohol use: Yes     Alcohol/week: 0.8 standard drinks     Comment: Alcoholic Drinks/day: 1 glass of red wine         Alcohol Use 1/20/2023   Prescreen: >3 drinks/day or >7 drinks/week? No   Patient Care Team:  Brenda Godoy MD as PCP - General (Internal Medicine)  Brenda Godoy MD as Assigned PCP    The following health maintenance items are reviewed in Epic and correct as of today:  Health Maintenance   Topic Date Due     MEDICARE ANNUAL WELLNESS VISIT  10/08/2021     ANNUAL REVIEW OF HM ORDERS  10/11/2022     " "PHQ-9  03/02/2023     DTAP/TDAP/TD IMMUNIZATION (2 - Td or Tdap) 06/28/2023     FALL RISK ASSESSMENT  01/20/2024     ADVANCE CARE PLANNING  01/20/2028     DEXA  12/09/2037     DEPRESSION ACTION PLAN  Completed     INFLUENZA VACCINE  Completed     Pneumococcal Vaccine: 65+ Years  Completed     ZOSTER IMMUNIZATION  Completed     COVID-19 Vaccine  Completed     IPV IMMUNIZATION  Aged Out     MENINGITIS IMMUNIZATION  Aged Out     {Pertinent mammograms are reviewed under the imaging tab.    Review of Systems   Constitutional: Negative for chills and fever.   HENT: Negative for congestion, ear pain, hearing loss and sore throat.    Eyes: Negative for pain and visual disturbance.   Respiratory: Negative for cough and shortness of breath.    Cardiovascular: Negative for chest pain, palpitations and peripheral edema.   Gastrointestinal: Positive for diarrhea. Negative for abdominal pain, constipation, heartburn, hematochezia and nausea.   Breasts:  Negative for tenderness, breast mass and discharge.   Genitourinary: Negative for dysuria, frequency, genital sores, hematuria, pelvic pain, urgency, vaginal bleeding and vaginal discharge.   Musculoskeletal: Negative for arthralgias, joint swelling and myalgias.   Skin: Negative for rash.   Neurological: Negative for dizziness, weakness, headaches and paresthesias.   Psychiatric/Behavioral: Positive for mood changes. The patient is nervous/anxious.    She continues to see her psychiatrist for depression.  Overall mood has been stable she denies any chest pains or palpitations    OBJECTIVE:   /70   Pulse 60   Temp 98.4  F (36.9  C) (Tympanic)   Resp 14   Ht 1.727 m (5' 8\")   Wt 75.2 kg (165 lb 11.2 oz)   SpO2 98%   BMI 25.19 kg/m   Estimated body mass index is 25.19 kg/m  as calculated from the following:    Height as of this encounter: 1.727 m (5' 8\").    Weight as of this encounter: 75.2 kg (165 lb 11.2 oz).  Physical Exam  General: well appearing female, alert and " oriented x3  EYES: Eyelids, conjunctiva, and sclera were normal. Pupils were normal.   HEAD, EARS, NOSE, MOUTH, AND THROAT: no cervical LAD, no thyromegaly or nodules appreciated. TMs are visualized and normal, oropharynx is clear.  RESPIRATORY: respirations non labored, CTA bl, no wheezes, rales, no forced expiratory wheezing.  CARDIOVASCULAR: Heart rate and rhythm were normal. No murmurs, rubs,gallops. There was no peripheral edema.   GASTROINTESTINAL: Positive bowel sounds, abdomen is soft, non tender, non distended.     MUSCULOSKELETAL: Muscle mass was normal for age. No joint synovitis or deformity.  LYMPHATIC: There were no enlarged nodes palpable.  SKIN/HAIR/NAILS: Skin color was normal.  No rashes.  Few seborrheic keratosis noted.  NEUROLOGIC: The patient was alert and oriented.  Speech was normal.  There is no facial asymmetry.   PSYCHIATRIC:  Mood and affect were normal.   Breast exam: No axilla lymphadenopathy, breast masses or skin changes appreciated.      ASSESSMENT / PLAN:   Ellen was seen today for recheck medication and physical.    Diagnoses and all orders for this visit:    Encounter for Medicare annual wellness exam  She is up-to-date on preventative care, will check labs today    Iron deficiency anemia due to chronic blood loss  Persistent moderate anemia since her TAVR surgery, suspect due to aspirin and Plavix.  Since its been 6 months since her surgery, discussed okay to stop Plavix.  She only took iron until her pill bottle lasted and did not continue, and she forgets to take pantoprazole before dinner, discussed to take it before breakfast instead.  She does have endoscopy scheduled.  -     CBC with platelets and differential  -     Iron and iron binding capacity    S/P TAVR (transcatheter aortic valve replacement)  This surgery was in June, since its been 6 months, per cardiology note okay to stop Plavix, she will continue aspirin lifelong    Cerebrovascular accident (CVA), unspecified  "mechanism (H)  Secondary to severe aortic stenosis.  She has had neurocognitive evaluation which did show mild cognitive deficits.  She will have [facility] occupational therapy evaluation for driving.    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  Mood has been stable    Essential hypertension  Blood pressure is well controlled  -     Basic metabolic panel  (Ca, Cl, CO2, Creat, Gluc, K, Na, BUN)    Vitamin D deficiency    Tarry stool  -     Immunos occult blood; Future  -     Immunos occult blood    EGD preop.  She is medically stable for this low risk procedure.  Discussed to take metoprolol morning before the procedure.        BMI:   Estimated body mass index is 25.19 kg/m  as calculated from the following:    Height as of this encounter: 1.727 m (5' 8\").    Weight as of this encounter: 75.2 kg (165 lb 11.2 oz).         She reports that she has never smoked. She has never used smokeless tobacco.      Appropriate preventive services were discussed with this patient, including applicable screening as appropriate for cardiovascular disease, diabetes, osteopenia/osteoporosis, and glaucoma.  As appropriate for age/gender, discussed screening for colorectal cancer, prostate cancer, breast cancer, and cervical cancer. Checklist reviewing preventive services available has been given to the patient.    Reviewed patients plan of care and provided an AVS. The Basic Care Plan (routine screening as documented in Health Maintenance) for Ellen meets the Care Plan requirement. This Care Plan has been established and reviewed with the Patient.      Brenda Godoy MD  [facility]    Identified Health Risks:  "

## 2023-01-20 NOTE — PROGRESS NOTES
She is at risk for lack of exercise and has been provided with information to increase physical activity for the benefit of her well-being.  The patient was counseled and encouraged to consider modifying their diet and eating habits. She was provided with information on recommended healthy diet options.  The patient reports that she has difficulty with activities of daily living. I have asked that the patient make a follow up appointment in 12 weeks where this issue will be further evaluated and addressed with psychiatry.

## 2023-01-20 NOTE — LETTER
January 21, 2023      Meghan KRYSTAL Matteo  [address] [place] [address]        Dear Meghan    Your blood work shows that Iron level is better, but still low. Anemia is stable but not improved, moderate. Please proceed with endoscopy as planned.     Please  restart daily iron supplement after you are done with procedure. We should repeat your red cell count labs in 2 months. I put order in.    Continue Pantoprazole daily.    Kidney function is stable and improved.    Resulted Orders   Iron and iron binding capacity   Result Value Ref Range    Iron 75 37 - 145 ug/dL    Iron Binding Capacity 475 (H) 240 - 430 ug/dL    Iron Sat Index 16 15 - 46 %   Basic metabolic panel  (Ca, Cl, CO2, Creat, Gluc, K, Na, BUN)   Result Value Ref Range    Sodium 142 136 - 145 mmol/L    Potassium 4.5 3.4 - 5.3 mmol/L    Chloride 108 (H) 98 - 107 mmol/L    Carbon Dioxide (CO2) 21 (L) 22 - 29 mmol/L    Anion Gap 13 7 - 15 mmol/L    Urea Nitrogen 21.3 8.0 - 23.0 mg/dL    Creatinine 1.17 (H) 0.51 - 0.95 mg/dL    Calcium 9.3 8.8 - 10.2 mg/dL    Glucose 89 70 - 99 mg/dL    GFR Estimate 47 (L) >60 mL/min/1.73m2      Comment:      Effective December 21, 2021 eGFRcr in adults is calculated using the 2021 CKD-EPI creatinine equation which includes age and gender (Dolly et al., NE, DOI: 10.1056/NALZwk8040178)   CBC with platelets and differential   Result Value Ref Range    WBC Count 6.1 4.0 - 11.0 10e3/uL    RBC Count 4.14 3.80 - 5.20 10e6/uL    Hemoglobin 9.9 (L) 11.7 - 15.7 g/dL    Hematocrit 32.7 (L) 35.0 - 47.0 %    MCV 79 78 - 100 fL    MCH 23.9 (L) 26.5 - 33.0 pg    MCHC 30.3 (L) 31.5 - 36.5 g/dL    RDW 21.9 (H) 10.0 - 15.0 %    Platelet Count 224 150 - 450 10e3/uL    % Neutrophils 64 %    % Lymphocytes 23 %    % Monocytes 10 %    % Eosinophils 2 %    % Basophils 1 %    % Immature Granulocytes 0 %    Absolute Neutrophils 3.9 1.6 - 8.3 10e3/uL    Absolute Lymphocytes 1.4 0.8 - 5.3 10e3/uL    Absolute Monocytes 0.6 0.0 - 1.3 10e3/uL    Absolute  Eosinophils 0.1 0.0 - 0.7 10e3/uL    Absolute Basophils 0.1 0.0 - 0.2 10e3/uL    Absolute Immature Granulocytes 0.0 <=0.4 10e3/uL       If you have any questions or concerns, please call the clinic at the number listed above.       Sincerely,      Brenda Godoy MD

## 2023-01-20 NOTE — PATIENT INSTRUCTIONS
Since it has been 6 months after your valve surgery, O'K to stop Clopidogrel and continue on Aspirin.    2. O'K to proceed with endoscopy. Morning of endoscopy O'K to take metoprolol and hold everything else.     2. Continue pantoprazole , can take in am before breakfast.    3. Will check hemoglobin level today to assess anemia.    4. Follow up with ophthalmology for eye exam      Patient Education   Personalized Prevention Plan  You are due for the preventive services outlined below.  Your care team is available to assist you in scheduling these services.  If you have already completed any of these items, please share that information with your care team to update in your medical record.  Health Maintenance Due   Topic Date Due    ANNUAL REVIEW OF HM ORDERS  10/11/2022       Exercise for a Healthier Heart  You may wonder how you can improve the health of your heart. If you re thinking about exercise, you re on the right track. You don t need to become an athlete. But you do need a certain amount of brisk exercise to help strengthen your heart. If you have been diagnosed with a heart condition, your healthcare provider may advise exercise to help stabilize your condition. To help make exercise a habit, choose safe, fun activities.      Exercise with a friend. When activity is fun, you're more likely to stick with it.   Before you start  Check with your healthcare provider before starting an exercise program. This is especially important if you have not been active for a while. It's also important if you have a long-term (chronic) health problem such as heart disease, diabetes, or obesity. Or if you are at high risk for having these problems.   Why exercise?  Exercising regularly offers many healthy rewards. It can help you do all of the following:   Improve your blood cholesterol level to help prevent further heart trouble  Lower your blood pressure to help prevent a stroke or heart attack  Control diabetes, or  reduce your risk of getting this disease  Improve your heart and lung function  Reach and stay at a healthy weight  Make your muscles stronger so you can stay active  Prevent falls and fractures by slowing the loss of bone mass (osteoporosis)  Manage stress better  Reduce your blood pressure  Improve your sense of self and your body image  Exercise tips    Ease into your routine. Set small goals. Then build on them. If you are not sure what your activity level should be, talk with your healthcare provider first before starting an exercise routine.  Exercise on most days. Aim for a total of 150 minutes (2 hours and 30 minutes) or more of moderate-intensity aerobic activity each week. Or 75 minutes (1 hour and 15 minutes) or more of vigorous-intensity aerobic activity each week. Or try for a combination of both. Moderate activity means that you breathe heavier and your heart rate increases but you can still talk. Think about doing 40 minutes of moderate exercise, 3 to 4 times a week. For best results, activity should last for about 40 minutes to lower blood pressure and cholesterol. It's OK to work up to the 40-minute period over time. Examples of moderate-intensity activity are walking 1 mile in 15 minutes. Or doing 30 to 45 minutes of yard work.  Step up your daily activity level.  Along with your exercise program, try being more active the whole day. Walk instead of drive. Or park further away so that you take more steps each day. Do more household tasks or yard work. You may not be able to meet the advised mount of physical activity. But doing some moderate- or vigorous-intensity aerobic activity can help reduce your risk for heart disease. Your healthcare provider can help you figure out what is best for you.  Choose 1 or more activities you enjoy.  Walking is one of the easiest things you can do. You can also try swimming, riding a bike, dancing, or taking an exercise class.    When to call your healthcare  provider  Call your healthcare provider if you have any of these:   Chest pain or feel dizzy or lightheaded  Burning, tightness, pressure, or heaviness in your chest, neck, shoulders, back, or arms  Abnormal shortness of breath  More joint or muscle pain  A very fast or irregular heartbeat (palpitations)  [employer] last reviewed this educational content on 7/1/2019 2000-2021 [employer]. All rights reserved. This information is not intended as a substitute for professional medical care. Always follow your healthcare professional's instructions.          Understanding USDA MyPlate  The USDA has guidelines to help you make healthy food choices. These are called MyPlate. MyPlate shows the food groups that make up healthy meals using the image of a place setting. Before you eat, think about the healthiest choices for what to put on your plate or in your cup or bowl. To learn more about building a healthy plate, visit www.choosemyplate.gov.    The food groups  Fruits. Any fruit or 100% fruit juice counts as part of the Fruit Group. Fruits may be fresh, canned, frozen, or dried, and may be whole, cut-up, or pureed. Make 1/2 of your plate fruits and vegetables.  Vegetables. Any vegetable or 100% vegetable juice counts as a member of the Vegetable Group. Vegetables may be fresh, frozen, canned, or dried. They can be served raw or cooked and may be whole, cut-up, or mashed. Make 1/2 of your plate fruits and vegetables.  Grains. All foods made from grains are part of the Grains Group. These include wheat, rice, oats, cornmeal, and barley. Grains are often used to make foods such as bread, pasta, oatmeal, cereal, tortillas, and grits. Grains should be no more than 1/4 of your plate. At least half of your grains should be whole grains.  Protein. This group includes meat, poultry, seafood, beans and peas, eggs, processed soy products (such as tofu), nuts (including nut butters), and seeds. Make protein choices no  more than 1/4 of your plate. Meat and poultry choices should be lean or low fat.  Dairy. The Dairy Group includes all fluid milk products and foods made from milk that contain calcium, such as yogurt and cheese. (Foods that have little calcium, such as cream, butter, and cream cheese, are not part of this group.) Most dairy choices should be low-fat or fat-free.  Oils. Oils aren't a food group, but they do contain essential nutrients. However it's important to watch your intake of oils. These are fats that are liquid at room temperature. They include canola, corn, olive, soybean, vegetable, and sunflower oil. Foods that are mainly oil include mayonnaise, certain salad dressings, and soft margarines. You likely already get your daily oil allowance from the foods you eat.  Things to limit  Eating healthy also means limiting these things in your diet:     Salt (sodium). Many processed foods have a lot of sodium. To keep sodium intake down, eat fresh vegetables, meats, poultry, and seafood when possible. Purchase low-sodium, reduced-sodium, or no-salt-added food products at the store. And don't add salt to your meals at home. Instead, season them with herbs and spices such as dill, oregano, cumin, and paprika. Or try adding flavor with lemon or lime zest and juice.  Saturated fat. Saturated fats are most often found in animal products such as beef, pork, and chicken. They are often solid at room temperature, such as butter. To reduce your saturated fat intake, choose leaner cuts of meat and poultry. And try healthier cooking methods such as grilling, broiling, roasting, or baking. For a simple lower-fat swap, use plain nonfat yogurt instead of mayonnaise when making potato salad or macaroni salad.  Added sugars. These are sugars added to foods. They are in foods such as ice cream, candy, soda, fruit drinks, sports drinks, energy drinks, cookies, pastries, jams, and syrups. Cut down on added sugars by sharing sweet  treats with a family member or friend. You can also choose fruit for dessert, and drink water or other unsweetened beverages.     [employer] last reviewed this educational content on 6/1/2020 2000-2021 [employer]. All rights reserved. This information is not intended as a substitute for professional medical care. Always follow your healthcare professional's instructions.        Activities of Daily Living    Your Health Risk Assessment indicates you have difficulties with activities of daily living such as housework, bathing, preparing meals, taking medication, etc. Please make a follow up appointment for us to address this issue in more detail.

## 2023-01-23 PROCEDURE — 82274 ASSAY TEST FOR BLOOD FECAL: CPT | Performed by: INTERNAL MEDICINE

## 2023-01-25 LAB — HEMOCCULT STL QL IA: NEGATIVE

## 2023-02-02 DIAGNOSIS — D50.9 IRON DEFICIENCY ANEMIA, UNSPECIFIED IRON DEFICIENCY ANEMIA TYPE: ICD-10-CM

## 2023-02-03 RX ORDER — PANTOPRAZOLE SODIUM 20 MG/1
20 TABLET, DELAYED RELEASE ORAL 2 TIMES DAILY
Qty: 180 TABLET | Refills: 3 | Status: SHIPPED | OUTPATIENT
Start: 2023-02-03 | End: 2023-10-27

## 2023-02-03 NOTE — TELEPHONE ENCOUNTER
"Last Written Prescription Date:  10/24/2022  Last Fill Quantity: 60,  # refills: 1   Last office visit provider:  1/20/2023     Requested Prescriptions   Pending Prescriptions Disp Refills     pantoprazole (PROTONIX) 20 MG EC tablet [Pharmacy Med Name: PANTOPRAZOLE SOD DR 20 MG T 20 Tablet] 60 tablet 1     Sig: TAKE 1 TABLET (20 MG) BY MOUTH 2 TIMES DAILY 20 MINUTES BEFORE EATING       PPI Protocol Passed - 2/2/2023  6:31 PM        Passed - Not on Clopidogrel (unless Pantoprazole ordered)        Passed - No diagnosis of osteoporosis on record        Passed - Recent (12 mo) or future (30 days) visit within the authorizing provider's specialty     Patient has had an office visit with the authorizing provider or a provider within the authorizing providers department within the previous 12 mos or has a future within next 30 days. See \"Patient Info\" tab in inbasket, or \"Choose Columns\" in Meds & Orders section of the refill encounter.              Passed - Medication is active on med list        Passed - Patient is age 18 or older        Passed - No active pregnacy on record        Passed - No positive pregnancy test in past 12 months             Madiha Horner RN 02/03/23 1:39 PM  "

## 2023-02-07 ENCOUNTER — TRANSFERRED RECORDS (OUTPATIENT)
Dept: HEALTH INFORMATION MANAGEMENT | Facility: CLINIC | Age: 83
End: 2023-02-07

## 2023-02-24 ENCOUNTER — OFFICE VISIT (OUTPATIENT)
Dept: INTERNAL MEDICINE | Facility: CLINIC | Age: 83
End: 2023-02-24
Payer: MEDICARE

## 2023-02-24 VITALS
SYSTOLIC BLOOD PRESSURE: 108 MMHG | BODY MASS INDEX: 26.48 KG/M2 | HEART RATE: 71 BPM | DIASTOLIC BLOOD PRESSURE: 58 MMHG | WEIGHT: 168.7 LBS | HEIGHT: 67 IN | OXYGEN SATURATION: 98 % | RESPIRATION RATE: 20 BRPM

## 2023-02-24 DIAGNOSIS — I63.9 CEREBROVASCULAR ACCIDENT (CVA), UNSPECIFIED MECHANISM (H): ICD-10-CM

## 2023-02-24 DIAGNOSIS — Z95.2 S/P TAVR (TRANSCATHETER AORTIC VALVE REPLACEMENT): ICD-10-CM

## 2023-02-24 DIAGNOSIS — D50.9 IRON DEFICIENCY ANEMIA, UNSPECIFIED IRON DEFICIENCY ANEMIA TYPE: Primary | ICD-10-CM

## 2023-02-24 LAB
BASOPHILS # BLD AUTO: 0.1 10E3/UL (ref 0–0.2)
BASOPHILS NFR BLD AUTO: 1 %
EOSINOPHIL # BLD AUTO: 0.2 10E3/UL (ref 0–0.7)
EOSINOPHIL NFR BLD AUTO: 2 %
ERYTHROCYTE [DISTWIDTH] IN BLOOD BY AUTOMATED COUNT: 21.6 % (ref 10–15)
HCT VFR BLD AUTO: 34.2 % (ref 35–47)
HGB BLD-MCNC: 10.4 G/DL (ref 11.7–15.7)
IMM GRANULOCYTES # BLD: 0 10E3/UL
IMM GRANULOCYTES NFR BLD: 0 %
IRON BINDING CAPACITY (ROCHE): 433 UG/DL (ref 240–430)
IRON SATN MFR SERPL: 35 % (ref 15–46)
IRON SERPL-MCNC: 153 UG/DL (ref 37–145)
LYMPHOCYTES # BLD AUTO: 1.2 10E3/UL (ref 0.8–5.3)
LYMPHOCYTES NFR BLD AUTO: 17 %
MCH RBC QN AUTO: 25.3 PG (ref 26.5–33)
MCHC RBC AUTO-ENTMCNC: 30.4 G/DL (ref 31.5–36.5)
MCV RBC AUTO: 83 FL (ref 78–100)
MONOCYTES # BLD AUTO: 0.6 10E3/UL (ref 0–1.3)
MONOCYTES NFR BLD AUTO: 9 %
NEUTROPHILS # BLD AUTO: 5.1 10E3/UL (ref 1.6–8.3)
NEUTROPHILS NFR BLD AUTO: 71 %
PLATELET # BLD AUTO: 205 10E3/UL (ref 150–450)
RBC # BLD AUTO: 4.11 10E6/UL (ref 3.8–5.2)
VIT B12 SERPL-MCNC: 376 PG/ML (ref 232–1245)
WBC # BLD AUTO: 7.2 10E3/UL (ref 4–11)

## 2023-02-24 PROCEDURE — 83540 ASSAY OF IRON: CPT | Performed by: INTERNAL MEDICINE

## 2023-02-24 PROCEDURE — 36415 COLL VENOUS BLD VENIPUNCTURE: CPT | Performed by: INTERNAL MEDICINE

## 2023-02-24 PROCEDURE — 82607 VITAMIN B-12: CPT | Performed by: INTERNAL MEDICINE

## 2023-02-24 PROCEDURE — 85025 COMPLETE CBC W/AUTO DIFF WBC: CPT | Performed by: INTERNAL MEDICINE

## 2023-02-24 PROCEDURE — 99214 OFFICE O/P EST MOD 30 MIN: CPT | Performed by: INTERNAL MEDICINE

## 2023-02-24 PROCEDURE — 83550 IRON BINDING TEST: CPT | Performed by: INTERNAL MEDICINE

## 2023-02-24 RX ORDER — PAROXETINE 30 MG/1
1 TABLET, FILM COATED ORAL
COMMUNITY
Start: 2023-01-25 | End: 2023-07-06

## 2023-02-24 ASSESSMENT — PATIENT HEALTH QUESTIONNAIRE - PHQ9
SUM OF ALL RESPONSES TO PHQ QUESTIONS 1-9: 4
SUM OF ALL RESPONSES TO PHQ QUESTIONS 1-9: 4
10. IF YOU CHECKED OFF ANY PROBLEMS, HOW DIFFICULT HAVE THESE PROBLEMS MADE IT FOR YOU TO DO YOUR WORK, TAKE CARE OF THINGS AT HOME, OR GET ALONG WITH OTHER PEOPLE: SOMEWHAT DIFFICULT

## 2023-02-24 NOTE — PATIENT INSTRUCTIONS
Labs today to check anemia. If hemoglobin is still low, would recommend colonoscopy next.    2. Use cane to help off load knee. Tylenol 650 mg 3 times a day as needed for pain.

## 2023-02-24 NOTE — PROGRESS NOTES
"  Assessment & Plan     Iron deficiency anemia, unspecified iron deficiency anemia type  This was started after TAVR procedure and persistent to be moderately decreased.  She was on aspirin and Plavix at that time.  Hemoglobin did not improve after iron supplementation.  She did have endoscopy done recently which was normal.  She is on iron once a day and pantoprazole once a day.  She denies any abdominal pain.  We will repeat CBC again today.  If its not improving, discussed need for colonoscopy.  I would like her hemoglobin to be normal before we would consider knee replacement surgery.  - CBC with platelets and differential; Future  - Iron and iron binding capacity; Future  - Vitamin B12; Future  - CBC with platelets and differential  - Iron and iron binding capacity  - Vitamin B12    S/P TAVR (transcatheter aortic valve replacement)  She is doing well.  Plavix was discontinued 6 months after her procedure in December.  She continues on aspirin    Cerebrovascular accident (CVA), unspecified mechanism (H)  She had a small stroke due to aortic stenosis which was severe.  Currently she does not have any persistent neurological deficits.  She has had occupational therapy evaluation for driving and was cleared.  She avoids highways.       BMI:   Estimated body mass index is 26.82 kg/m  as calculated from the following:    Height as of this encounter: 1.689 m (5' 6.5\").    Weight as of this encounter: 76.5 kg (168 lb 11.2 oz).     Return in about 3 months (around 5/24/2023) for Follow up.    Brenda Godoy MD  [facility]    Subjective   Meghan is a 82 year old, presenting for the following health issues:  Anemia (Pt is concerned for anemia ) and Knee Problem (Pt requesting to discuss knee replacement )    Ellen is a 82 year old female with mild memory problems, anxiety, depression and OCD, high blood pressure, hyperlipidemia, aortic stenosis, history of collagenous colitis and IBS, " osteopenia, CRI.  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  Most recently she has had transcatheter aortic valve replacement on June 21 2022.     Update: Well up replacement surgery she developed anemia which continue to be monitored.  She is currently takes pantoprazole 20 mg daily and iron once a day.  She did have endoscopy and [facility] which was negative for celiac disease and esophagus and stomach were normal.  If anemia persist, they recommended colonoscopy.    Currently patient denies any abdominal symptoms.    She does have right knee arthritis and wants to have knee replacement surgery done.  Most recently she already did at [facility] and has had mild increase in pain and slight weakness in her right.  She has a cane at home but has not been using it.    She has chronic depression.  Currently slightly anxious because in July she and her  will be moving to a smaller group home community at [facility].  She is slowly packing.    She did have a small stroke prior to her aortic valve replacement due to aortic stenosis.  She did have occupational therapy evaluation with angel Estrada and was cleared to drive.  She does avoid highways.  Currently she is on aspirin.  Plavix was discontinued in December 6 months after her TAVR surgery.    Anemia    History of Present Illness       Reason for visit:  Knee    She eats 2-3 servings of fruits and vegetables daily.She consumes 1 sweetened beverage(s) daily.She exercises with enough effort to increase her heart rate 9 or less minutes per day.  She exercises with enough effort to increase her heart rate 3 or less days per week.   She is taking medications regularly.    Today's PHQ-9         PHQ-9 Total Score: 4    PHQ-9 Q9 Thoughts of better off dead/self-harm past 2 weeks :   Not at all    How difficult have these problems made  "it for you to do your work, take care of things at home, or get along with other people: Somewhat difficult       Review of Systems   As above      Objective    /58 (BP Location: Left arm, Patient Position: Sitting, Cuff Size: Adult Regular)   Pulse 71   Resp 20   Ht 1.689 m (5' 6.5\")   Wt 76.5 kg (168 lb 11.2 oz)   SpO2 98%   BMI 26.82 kg/m    Body mass index is 26.82 kg/m .  Physical Exam   General: well appearing female, alert and oriented x3  EYES: Eyelids, conjunctiva, and sclera were normal. Pupils were normal.   HEAD, EARS, NOSE, MOUTH, AND THROAT: no cervical LAD, no thyromegaly or nodules appreciated. TMs are visualized and normal, oropharynx is clear.  RESPIRATORY: respirations non labored, CTA bl, no wheezes, rales, no forced expiratory wheezing.  CARDIOVASCULAR: Heart rate and rhythm were normal. No murmurs, rubs,gallops. There was no peripheral edema.   GASTROINTESTINAL: Positive bowel sounds, abdomen is soft, non tender, non distended.     MUSCULOSKELETAL: Muscle mass was normal for age. No joint synovitis or deformity.  LYMPHATIC: There were no enlarged nodes palpable.  SKIN/HAIR/NAILS: Skin color was normal.  No rashes.  NEUROLOGIC: The patient was alert and oriented.  Speech was normal.  There is no facial asymmetry.   PSYCHIATRIC:  Mood and affect were normal.         "

## 2023-03-06 ENCOUNTER — TELEPHONE (OUTPATIENT)
Dept: INTERNAL MEDICINE | Facility: CLINIC | Age: 83
End: 2023-03-06
Payer: MEDICARE

## 2023-03-06 DIAGNOSIS — D50.9 IRON DEFICIENCY ANEMIA, UNSPECIFIED IRON DEFICIENCY ANEMIA TYPE: Primary | ICD-10-CM

## 2023-03-07 DIAGNOSIS — D50.9 IRON DEFICIENCY ANEMIA, UNSPECIFIED IRON DEFICIENCY ANEMIA TYPE: ICD-10-CM

## 2023-03-07 NOTE — TELEPHONE ENCOUNTER
Spoke with patient in regards to results.    Since she is off Plavix, anemia is improving.  Iron is still slightly low.  She will continue on iron and pantoprazole.  Recent endoscopy did not show any ulcers.  She did have colonoscopy in 2017 which showed 0 polyps.    We will repeat CBC level in May.  Once it is normalized, we can stop iron.  She should plan for knee replacement surgery in the late spring/summer.

## 2023-03-09 RX ORDER — PANTOPRAZOLE SODIUM 20 MG/1
20 TABLET, DELAYED RELEASE ORAL 2 TIMES DAILY
Qty: 30 TABLET | Refills: 1 | OUTPATIENT
Start: 2023-03-09

## 2023-03-21 DIAGNOSIS — I63.9 CEREBROVASCULAR ACCIDENT (CVA), UNSPECIFIED MECHANISM (H): ICD-10-CM

## 2023-03-22 RX ORDER — ROSUVASTATIN CALCIUM 10 MG/1
10 TABLET, COATED ORAL DAILY
Qty: 90 TABLET | Refills: 1 | Status: SHIPPED | OUTPATIENT
Start: 2023-03-22

## 2023-03-22 NOTE — TELEPHONE ENCOUNTER
"Last Written Prescription Date:  6/1/2022  Last Fill Quantity: 90,  # refills: 3   Last office visit provider:  2/23/2023     Requested Prescriptions   Pending Prescriptions Disp Refills     rosuvastatin (CRESTOR) 10 MG tablet [Pharmacy Med Name: ROSUVASTATIN 10 MG TAB** 10 Tablet] 90 tablet 3     Sig: TAKE 1 TABLET (10 MG) BY MOUTH DAILY       Statins Protocol Passed - 3/21/2023  3:29 PM        Passed - LDL on file in past 12 months     Recent Labs   Lab Test 09/02/22  1041   LDL 51             Passed - No abnormal creatine kinase in past 12 months     No lab results found.             Passed - Recent (12 mo) or future (30 days) visit within the authorizing provider's specialty     Patient has had an office visit with the authorizing provider or a provider within the authorizing providers department within the previous 12 mos or has a future within next 30 days. See \"Patient Info\" tab in inbasket, or \"Choose Columns\" in Meds & Orders section of the refill encounter.              Passed - Medication is active on med list        Passed - Patient is age 18 or older        Passed - No active pregnancy on record        Passed - No positive pregnancy test in past 12 months             Carmelina Lunsford RN 03/22/23 12:21 PM  "

## 2023-04-19 DIAGNOSIS — D50.0 IRON DEFICIENCY ANEMIA DUE TO CHRONIC BLOOD LOSS: ICD-10-CM

## 2023-04-20 RX ORDER — FERROUS SULFATE 325(65) MG
TABLET ORAL
Qty: 90 TABLET | Refills: 1 | Status: SHIPPED | OUTPATIENT
Start: 2023-04-20 | End: 2023-06-01

## 2023-04-20 NOTE — TELEPHONE ENCOUNTER
"Routing refill request to provider for review/approval because:  Early refill request by local pharmacy    Last Written Prescription Date:  11/11/2022  Last Fill Quantity: 90,  # refills: 1   Last office visit provider:  2/24/2023     Requested Prescriptions   Pending Prescriptions Disp Refills     FEROSUL 325 (65 Fe) MG tablet [Pharmacy Med Name: FEROSUL 325 (65 FE) MG TABS 325 (65 FE) Tablet] 90 tablet 1     Sig: TAKE 1 TABLET (325 MG) BY MOUTH DAILY (WITH LUNCH)       Iron Supplements Passed - 4/20/2023 11:41 AM        Passed - Patient is 12 years of age or older        Passed - Recent (12 mo) or future (30 days) visit within the authorizing provider's specialty     Patient has had an office visit with the authorizing provider or a provider within the authorizing providers department within the previous 12 mos or has a future within next 30 days. See \"Patient Info\" tab in inbasket, or \"Choose Columns\" in Meds & Orders section of the refill encounter.              Passed - Hgb OR Hct on record within the past 12 mos.     Patient need only have had a HGB or HCT on file in the past 12 mos. That result does not need to be normal.    Recent Labs   Lab Test 02/24/23  1440 01/20/23  1515 11/22/22  0941   HGB 10.4* 9.9* 9.8*     Recent Labs   Lab Test 02/24/23  1440 01/20/23  1515 11/22/22  0941   HCT 34.2* 32.7* 32.9*       Please verify a HGB or HCT has been checked SINCE THE LAST DOSE CHANGE.            Passed - Medication is active on med list             Odilia Bettencourt RN 04/20/23 11:41 AM  "

## 2023-05-19 ENCOUNTER — OFFICE VISIT (OUTPATIENT)
Dept: INTERNAL MEDICINE | Facility: CLINIC | Age: 83
End: 2023-05-19
Payer: MEDICARE

## 2023-05-19 ENCOUNTER — ANCILLARY PROCEDURE (OUTPATIENT)
Dept: GENERAL RADIOLOGY | Facility: CLINIC | Age: 83
End: 2023-05-19
Attending: INTERNAL MEDICINE
Payer: MEDICARE

## 2023-05-19 ENCOUNTER — TELEPHONE (OUTPATIENT)
Dept: INTERNAL MEDICINE | Facility: CLINIC | Age: 83
End: 2023-05-19

## 2023-05-19 VITALS
OXYGEN SATURATION: 95 % | HEIGHT: 67 IN | SYSTOLIC BLOOD PRESSURE: 122 MMHG | HEART RATE: 62 BPM | RESPIRATION RATE: 16 BRPM | TEMPERATURE: 97.9 F | DIASTOLIC BLOOD PRESSURE: 64 MMHG | WEIGHT: 166 LBS | BODY MASS INDEX: 26.06 KG/M2

## 2023-05-19 DIAGNOSIS — I10 PRIMARY HYPERTENSION: ICD-10-CM

## 2023-05-19 DIAGNOSIS — Z01.818 PRE-OP EXAM: ICD-10-CM

## 2023-05-19 DIAGNOSIS — R05.1 ACUTE COUGH: ICD-10-CM

## 2023-05-19 DIAGNOSIS — J20.9 ACUTE BRONCHITIS, UNSPECIFIED ORGANISM: ICD-10-CM

## 2023-05-19 DIAGNOSIS — J20.9 ACUTE BRONCHITIS, UNSPECIFIED ORGANISM: Primary | ICD-10-CM

## 2023-05-19 DIAGNOSIS — Z95.2 S/P TAVR (TRANSCATHETER AORTIC VALVE REPLACEMENT): ICD-10-CM

## 2023-05-19 DIAGNOSIS — J18.9 PNEUMONIA DUE TO INFECTIOUS ORGANISM, UNSPECIFIED LATERALITY, UNSPECIFIED PART OF LUNG: Primary | ICD-10-CM

## 2023-05-19 LAB
ANION GAP SERPL CALCULATED.3IONS-SCNC: 12 MMOL/L (ref 7–15)
BASOPHILS # BLD AUTO: 0 10E3/UL (ref 0–0.2)
BASOPHILS NFR BLD AUTO: 0 %
BUN SERPL-MCNC: 15.5 MG/DL (ref 8–23)
CALCIUM SERPL-MCNC: 9.2 MG/DL (ref 8.8–10.2)
CHLORIDE SERPL-SCNC: 107 MMOL/L (ref 98–107)
CREAT SERPL-MCNC: 1 MG/DL (ref 0.51–0.95)
DEPRECATED HCO3 PLAS-SCNC: 22 MMOL/L (ref 22–29)
EOSINOPHIL # BLD AUTO: 0.1 10E3/UL (ref 0–0.7)
EOSINOPHIL NFR BLD AUTO: 1 %
ERYTHROCYTE [DISTWIDTH] IN BLOOD BY AUTOMATED COUNT: 15.9 % (ref 10–15)
GFR SERPL CREATININE-BSD FRML MDRD: 56 ML/MIN/1.73M2
GLUCOSE SERPL-MCNC: 90 MG/DL (ref 70–99)
HCT VFR BLD AUTO: 37.4 % (ref 35–47)
HGB BLD-MCNC: 12 G/DL (ref 11.7–15.7)
IMM GRANULOCYTES # BLD: 0 10E3/UL
IMM GRANULOCYTES NFR BLD: 0 %
LYMPHOCYTES # BLD AUTO: 1.7 10E3/UL (ref 0.8–5.3)
LYMPHOCYTES NFR BLD AUTO: 18 %
MCH RBC QN AUTO: 27.9 PG (ref 26.5–33)
MCHC RBC AUTO-ENTMCNC: 32.1 G/DL (ref 31.5–36.5)
MCV RBC AUTO: 87 FL (ref 78–100)
MONOCYTES # BLD AUTO: 0.6 10E3/UL (ref 0–1.3)
MONOCYTES NFR BLD AUTO: 6 %
NEUTROPHILS # BLD AUTO: 7 10E3/UL (ref 1.6–8.3)
NEUTROPHILS NFR BLD AUTO: 75 %
PLATELET # BLD AUTO: 220 10E3/UL (ref 150–450)
POTASSIUM SERPL-SCNC: 4.4 MMOL/L (ref 3.4–5.3)
RBC # BLD AUTO: 4.3 10E6/UL (ref 3.8–5.2)
SODIUM SERPL-SCNC: 141 MMOL/L (ref 136–145)
WBC # BLD AUTO: 9.3 10E3/UL (ref 4–11)

## 2023-05-19 PROCEDURE — 36415 COLL VENOUS BLD VENIPUNCTURE: CPT | Performed by: INTERNAL MEDICINE

## 2023-05-19 PROCEDURE — 85025 COMPLETE CBC W/AUTO DIFF WBC: CPT | Performed by: INTERNAL MEDICINE

## 2023-05-19 PROCEDURE — 99214 OFFICE O/P EST MOD 30 MIN: CPT | Performed by: INTERNAL MEDICINE

## 2023-05-19 PROCEDURE — 80048 BASIC METABOLIC PNL TOTAL CA: CPT | Performed by: INTERNAL MEDICINE

## 2023-05-19 PROCEDURE — 71046 X-RAY EXAM CHEST 2 VIEWS: CPT | Mod: TC | Performed by: RADIOLOGY

## 2023-05-19 RX ORDER — DOXYCYCLINE HYCLATE 100 MG
100 TABLET ORAL 2 TIMES DAILY
Qty: 20 TABLET | Refills: 0 | Status: SHIPPED | OUTPATIENT
Start: 2023-05-19 | End: 2023-05-29

## 2023-05-19 RX ORDER — CEFPODOXIME PROXETIL 200 MG/1
200 TABLET, FILM COATED ORAL 2 TIMES DAILY
Qty: 20 TABLET | Refills: 0 | Status: SHIPPED | OUTPATIENT
Start: 2023-05-19 | End: 2023-05-29

## 2023-05-19 RX ORDER — METHYLPREDNISOLONE 4 MG
TABLET, DOSE PACK ORAL
Qty: 21 TABLET | Refills: 0 | Status: SHIPPED | OUTPATIENT
Start: 2023-05-19 | End: 2023-06-01

## 2023-05-19 NOTE — TELEPHONE ENCOUNTER
I Saw Meghan for pre procedure visit today for MARIAN at [facility] on monday .  She has bronchitis and I'm starting her on abx and steroids.  I recommend that her MARIAN be postponed by 2-3 weeks. Please call her cardiology Dr Izaguirre  office and let them know.

## 2023-05-19 NOTE — TELEPHONE ENCOUNTER
Discussed results of chest x-ray/middle lobe pneumonia, will add cefpodoxime to doxycycline.  Will need repeat chest x-ray in 8 weeks.

## 2023-05-19 NOTE — PROGRESS NOTES
[facility]  [address] [place] [address]  Phone: [phone]  Fax: [phone]  Primary Provider: Dank Piper  Pre-op Performing Provider: DANK PIPER      PREOPERATIVE EVALUATION:  Today's date: 5/19/2023    Ellen Felipe is a 82 year old female who presents for a preoperative evaluation.      5/19/2023    12:14 PM   Additional Questions   Roomed by Johana STORY     Surgical Information:  Surgery/Procedure: TAVR  Surgery Location: [facility]  Surgeon: Dr Izaguirre  Surgery Date: 5/22/23  Time of Surgery: arrive 7/8 am-not sure  Where patient plans to recover: At home with family  Fax number for surgical facility: [phone]    Assessment & Plan     The proposed surgical procedure is considered LOW risk.    Acute bronchitis, unspecified organism  Due to acute bronchitis and diffuse lung congestion on exam I recommended that she postpones this procedure with IV sedation.  We will treat her with antibiotic and that steroid boost and will check a chest x-ray today.  Our nurse will contact her cardiology office  - XR Chest 2 Views; Future  - methylPREDNISolone (MEDROL DOSEPAK) 4 MG tablet therapy pack; Follow Package Directions  - doxycycline hyclate (VIBRA-TABS) 100 MG tablet; Take 1 tablet (100 mg) by mouth 2 times daily for 10 days    S/P TAVR (transcatheter aortic valve replacement)  She had TAVR surgery last year.  Most recently she had heart ultrasound done in April which showed slightly increased periprosthetic, transesophageal echocardiogram was planned    Primary hypertension  She is currently on metoprolol and amlodipine  - CBC with platelets and differential  - Basic metabolic panel  (Ca, Cl, CO2, Creat, Gluc, K, Na, BUN)    Acute cough  As above, acute bronchitis.  Since she is out of time limit for COVID treatment and I will recommend postponing her procedure, no COVID testing was done today.    Pre-op exam  Due to acute  bronchitis, discussed to delay her procedure by 2-3 weeks      RECOMMENDATION:  Surgery is recommended to be postponed due to bronchitis      Subjective       HPI related to upcoming procedure:     Ellen is a 82 year old female with mild memory problems, anxiety, depression and OCD, high blood pressure, hyperlipidemia, aortic stenosis, history of collagenous colitis and IBS, osteopenia, CRI.  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  Most recently she has had transcatheter aortic valve replacement on June 21 2022.  She is currently here for preprocedure visit for transesophageal echocardiogram tomorrow to check on a small leak around her aortic valve replacement however she also has had cough for the last week and due to acute bronchitis we discussed I will be best to postpone this elective procedure.    Cough started 6 days ago.  She had some postnasal drainage and mildly sore throat.  There was slight mucus coming out, she denies any fevers or chills, she has not taking anything over-the-counter and denies any shortness of breath or wheezing she has not tested herself for COVID.  Currently she feels that symptoms are slightly improving.  She does not have any history of asthma or smoking.  She denies any chest pains palpitations or lower extremity edema.          5/19/2023    12:12 PM   Preop Questions   1. Have you ever had a heart attack or stroke? YES - 5/22   2. Have you ever had surgery on your heart or blood vessels, such as a stent placement, a coronary artery bypass, or surgery on an artery in your head, neck, heart, or legs? No   3. Do you have chest pain with activity? No   4. Do you have a history of  heart failure? No   5. Do you currently have a cold, bronchitis or symptoms of other infection? YES - cold today neg for COVID   6. Do you have a cough, shortness of breath,  or wheezing? No   7. Do you or anyone in your family have previous history of blood clots? UNKNOWN -    8. Do you or does anyone in your family have a serious bleeding problem such as prolonged bleeding following surgeries or cuts? No   9. Have you ever had problems with anemia or been told to take iron pills? YES -    10. Have you had any abnormal blood loss such as black, tarry or bloody stools, or abnormal vaginal bleeding? No   11. Have you ever had a blood transfusion? YES -    11a. Have you ever had a transfusion reaction? No   12. Are you willing to have a blood transfusion if it is medically needed before, during, or after your surgery? Yes   13. Have you or any of your relatives ever had problems with anesthesia? No   14. Do you have sleep apnea, excessive snoring or daytime drowsiness? No   15. Do you have any artifical heart valves or other implanted medical devices like a pacemaker, defibrillator, or continuous glucose monitor? YES -    15a. What type of device do you have? tAVER   15b. Name of the clinic that manages your device:  ?   16. Do you have artificial joints? No   17. Are you allergic to latex? No       Review of Systems  As above    Patient Active Problem List    Diagnosis Date Noted     Iron deficiency anemia due to chronic blood loss 10/26/2022     Priority: Medium     NSVT (nonsustained ventricular tachycardia) (H) 06/22/2022     Priority: Medium     Cerebrovascular accident (CVA), unspecified mechanism (H) 06/01/2022     Priority: Medium     Anemia 05/18/2022     Priority: Medium     Aphasia 05/18/2022     Priority: Medium     History of UTI gram-negative bacteremia with encephalopathy 06/12/2019     Priority: Medium     Urinary tract infection without hematuria, site unspecified      Priority: Medium     Hyponatremia 06/06/2019     Priority: Medium     Hypokalemia 06/06/2019     Priority: Medium     Acute cystitis without hematuria 06/06/2019     Priority: Medium     Irritable bowel  syndrome      Priority: Medium     Created by Conversion         Major Depression, Single Episode In Remission      Priority: Medium     Created by Conversion         Primary hypertension      Priority: Medium     Created by Conversion  Replacement Utility updated for latest IMO load         Hypercholesterolemia      Priority: Medium     Created by Conversion         Generalized anxiety disorder 09/28/2017     Priority: Medium     Collagenous colitis 05/02/2017     Priority: Medium     Biopsy proven 2017         Allergic Rhinitis      Priority: Medium     Created by Conversion  Replacement Utility updated for latest IMO load         Diverticulosis      Priority: Medium     Created by Conversion  Replacement Utility updated for latest IMO load         Osteopenia      Priority: Medium     Created by Conversion  Replacement Utility updated for latest IMO load         Herpes Simplex      Priority: Medium     Created by Conversion  Replacement Utility updated for latest IMO load         Aortic valve stenosis, etiology of cardiac valve disease unspecified 11/10/2015     Priority: Medium     With moderate regurgitation.          OCD (obsessive compulsive disorder) 04/10/2015     Priority: Medium     Benign Polyps Of The Large Intestine      Priority: Medium     Created by Conversion          Past Medical History:   Diagnosis Date     Aortic valve stenosis     TAVR 6/2022     Arthritis      Cerebral artery occlusion with cerebral infarction (H)      Depression      Essential hypertension     Created by Conversion  Replacement Utility updated for latest IMO load     Heart murmur      Hyperlipidemia      IBS (irritable bowel syndrome)      Iron deficiency anemia due to chronic blood loss 10/26/2022     OCD (obsessive compulsive disorder) 04/10/2015     OCD (obsessive compulsive disorder)      Past Surgical History:   Procedure Laterality Date     AORTIC VALVE SURGERY  06/2022    TAVR     HC REMOVE TONSILS/ADENOIDS,<13 Y/O    "   Description: Tonsillectomy With Adenoidectomy;  Recorded: 05/27/2010;     Current Outpatient Medications   Medication Sig Dispense Refill     acetaminophen (TYLENOL) 325 MG tablet [ACETAMINOPHEN (TYLENOL) 325 MG TABLET] Take 2 tablets (650 mg total) by mouth every 6 (six) hours as needed.  0     amLODIPine (NORVASC) 2.5 MG tablet TAKE 1 TABLET (2.5 MG) BY MOUTH DAILY 90 tablet 11     aspirin (ASA) 81 MG EC tablet Take 1 tablet (81 mg) by mouth daily       FEROSUL 325 (65 Fe) MG tablet TAKE 1 TABLET (325 MG) BY MOUTH DAILY (WITH LUNCH) 90 tablet 1     metoprolol succinate ER (TOPROL XL) 100 MG 24 hr tablet Take 1 tablet (100 mg) by mouth daily       pantoprazole (PROTONIX) 20 MG EC tablet TAKE 1 TABLET (20 MG) BY MOUTH 2 TIMES DAILY 20 MINUTES BEFORE EATING (Patient taking differently: Take 20 mg by mouth daily 20 minutes before eating) 180 tablet 3     PARoxetine (PAXIL) 30 MG tablet Take 1 tablet by mouth 2 times daily       rosuvastatin (CRESTOR) 10 MG tablet TAKE 1 TABLET (10 MG) BY MOUTH DAILY 90 tablet 1     amoxicillin (AMOXIL) 500 MG capsule Take 2,000 mg by mouth See Admin Instructions (Patient not taking: Reported on 5/19/2023)         Allergies   Allergen Reactions     Iron Dextran Swelling     Hydrochlorothiazide Other (See Comments)     Hyponatremia, hypokalemia     Lactose Diarrhea     Sertraline Diarrhea     Bactrim [Sulfamethoxazole-Trimethoprim] Hives and Rash        Social History     Tobacco Use     Smoking status: Never     Smokeless tobacco: Never   Vaping Use     Vaping status: Not on file   Substance Use Topics     Alcohol use: Yes     Alcohol/week: 0.8 standard drinks of alcohol     Comment: Alcoholic Drinks/day: 1 glass of red wine       History   Drug Use No         Objective     /64   Pulse 62   Temp 97.9  F (36.6  C) (Oral)   Resp 16   Ht 1.689 m (5' 6.5\")   Wt 75.3 kg (166 lb)   LMP  (LMP Unknown)   SpO2 95%   BMI 26.39 kg/m      Physical Exam  General: well appearing " female, alert and oriented x3  EYES: Eyelids, conjunctiva, and sclera were normal. Pupils were normal.   HEAD, EARS, NOSE, MOUTH, AND THROAT: no cervical LAD, no thyromegaly or nodules appreciated. TMs are visualized and normal, oropharynx is clear.  RESPIRATORY: respirations non labored, she does have diffuse rhonchi bilaterally and mild mucus impaction when she coughs, no wheezes.  CARDIOVASCULAR: Heart rate and rhythm were normal. No murmurs, rubs,gallops. There was no peripheral edema.  He wears compression stockings.  GASTROINTESTINAL: Positive bowel sounds, abdomen is soft, non tender, non distended.     MUSCULOSKELETAL: Muscle mass was normal for age. No joint synovitis or deformity.  LYMPHATIC: There were no enlarged nodes palpable.  SKIN/HAIR/NAILS: Skin color was normal.  No rashes.  NEUROLOGIC: The patient was alert and oriented.  Speech was normal.  There is no facial asymmetry.   PSYCHIATRIC:  Mood and affect were normal.         Recent Labs   Lab Test 02/24/23  1440 01/20/23  1515 11/22/22  0941 10/24/22  1156 09/02/22  1041   HGB 10.4* 9.9* 9.8*   < > 9.9*    224 224   < > 222   NA  --  142 139   < > 137   POTASSIUM  --  4.5 4.8   < > 4.3   CR  --  1.17* 1.30*   < > 1.26*   A1C  --   --   --   --  5.6    < > = values in this interval not displayed.           Signed Electronically by: Brenda Godoy MD  Copy of this evaluation report is provided to requesting physician.

## 2023-05-19 NOTE — TELEPHONE ENCOUNTER
Spoke to at Dr. Izaguirre's nurse Farooq at [facility] [phone]  and relayed provider diagnosis and recommendation below.      Farooq stated she will have her rescheduled.

## 2023-05-19 NOTE — PATIENT INSTRUCTIONS
Due to chest congestion and bronchitis, would postpone procedure for MARIAN by 203 weeks. Start on medrol richter pack and doxycycline antibiotic ( do not mix with iron supplement). Chest XR today. We will call your cardiology provider today to cancel the procedure.     2. Get covid booster this spring.    3. For constipation: Continue metamucil, can Try Miralax in liquid form . Iron supplements can be causing more constipation. Will check red cell count today and recommend on iron continuation.

## 2023-05-20 ENCOUNTER — NURSE TRIAGE (OUTPATIENT)
Dept: NURSING | Facility: CLINIC | Age: 83
End: 2023-05-20
Payer: MEDICARE

## 2023-05-20 NOTE — TELEPHONE ENCOUNTER
S: Medication clarification.  B: Writer got permission from patient to talk with her friend Kerry about her health.     Saw Dr. Godoy on 5/19.  Was DX with bronchitis.  Was prescribed-    doxycycline hyclate (VIBRA-TABS) 100 MG tablet    cefpodoxime (VANTIN) 200 MG tablet    methylPREDNISolone (MEDROL DOSEPAK) 4 MG tablet     Patient want's to clarify if she should be taking two antibiotics.  On her after visit summary only list one antibiotic to take.    A: Patient is going to take both antibiotics over the weekend.  Would like a call back on Monday.  She can be reached at home [phone]  after 11:00 am. Writer will send message to provider.    R: Protocol and care advice reviewed. Patient and friend Les verbalized understanding, in agreement with plan, and voiced no further questions. Call back with any new or worsening symptoms, concerns, or questions.    Kym Fields RN, MA  [facility] Triage Nurse Advisor    Reason for Disposition    [1] Caller has NON-URGENT medicine question about med that PCP prescribed AND [2] triager unable to answer question    Protocols used: MEDICATION QUESTION CALL-A-

## 2023-05-21 NOTE — TELEPHONE ENCOUNTER
Please let pt know, since chest XR did show pneumonia, she needs to take both antibiotics to cover it.  Dr MARQUIS

## 2023-05-22 NOTE — TELEPHONE ENCOUNTER
Provider Recommendation Follow Up:   Reached patient/caregiver. Informed of provider's recommendations. Patient verbalized understanding and agrees with the plan.     Pt will reach back out to clinic if she worsens or does not improve

## 2023-05-31 ENCOUNTER — TELEPHONE (OUTPATIENT)
Dept: INTERNAL MEDICINE | Facility: CLINIC | Age: 83
End: 2023-05-31

## 2023-05-31 NOTE — TELEPHONE ENCOUNTER
5/31 lm on vm to come in at 11:40AM tomorrow, 6/01, which would be her 's original appt, had he not switched with hers today. And to call back to let us know if she cannot make it.

## 2023-05-31 NOTE — TELEPHONE ENCOUNTER
Reason for Call:  Appointment Request    Patient requesting this type of appt:  Follow up pneumonia and anemia    Requested provider: Brenda Godoy    Reason patient unable to be scheduled: Not within requested timeframe    When does patient want to be seen/preferred time: Tuesday Thursday or Friday asap    Comments: patient wondering if she could be worked in f    Could we send this information to you in [facility] or would you prefer to receive a phone call?:   Patient would prefer a phone call   Okay to leave a detailed message?: Yes at [phone]    Call taken on 5/31/2023 at 11:13 AM by Tamara Shea

## 2023-05-31 NOTE — TELEPHONE ENCOUNTER
Patient was supposed to see me today but her  took the slot instead.  He was scheduled to see me tomorrow.  So please take  Slot from Lucio Felipe on Thursday and put Meghan in there.  Please come from his current appointment time.

## 2023-06-01 ENCOUNTER — OFFICE VISIT (OUTPATIENT)
Dept: INTERNAL MEDICINE | Facility: CLINIC | Age: 83
End: 2023-06-01
Payer: MEDICARE

## 2023-06-01 VITALS
SYSTOLIC BLOOD PRESSURE: 118 MMHG | WEIGHT: 166 LBS | OXYGEN SATURATION: 94 % | TEMPERATURE: 98.2 F | RESPIRATION RATE: 18 BRPM | DIASTOLIC BLOOD PRESSURE: 62 MMHG | HEART RATE: 62 BPM | HEIGHT: 67 IN | BODY MASS INDEX: 26.06 KG/M2

## 2023-06-01 DIAGNOSIS — Z01.818 PRE-OP EXAM: Primary | ICD-10-CM

## 2023-06-01 DIAGNOSIS — D64.9 ANEMIA, UNSPECIFIED TYPE: ICD-10-CM

## 2023-06-01 DIAGNOSIS — Z95.2 S/P TAVR (TRANSCATHETER AORTIC VALVE REPLACEMENT): ICD-10-CM

## 2023-06-01 DIAGNOSIS — J18.9 PNEUMONIA OF LOWER LOBE DUE TO INFECTIOUS ORGANISM, UNSPECIFIED LATERALITY: ICD-10-CM

## 2023-06-01 DIAGNOSIS — R21 RASH: ICD-10-CM

## 2023-06-01 PROCEDURE — 99214 OFFICE O/P EST MOD 30 MIN: CPT | Performed by: INTERNAL MEDICINE

## 2023-06-01 NOTE — PROGRESS NOTES
"  Assessment & Plan     Pre-op exam  Patient is currently medically stable for transesophageal echocardiogram.  No previous problems with anesthesia or sedation    S/P TAVR (transcatheter aortic valve replacement)  She had TAVR surgery in the summer 2022, most recent transthoracic ultrasound showed slightly increased periprostatic leak, she will be further evaluated with transesophageal ultrasound.    Pneumonia of lower lobe due to infectious organism, unspecified laterality  Bronchitis and mild right middle lobe consolidation diagnosed mid-May.  Currently she completed antibiotics and respiratory symptoms have completely resolved.  Discussed to repeat chest x-ray May and July to document resolution of consolidation.  - XR Chest 2 Views; Future    Rash  None itchy macular rash on her forearms only, nowhere else.  She was on doxycycline and cefpodoxime.  I suspect that rash represents sun sensitivity from recent doxycycline use.  Will monitor.    Anemia, unspecified type  History of persistent anemia after her TAVR surgery last year.  She completed iron supplementation and hemoglobin level 2 weeks ago was normal.  We will continue off iron for now and monitor.  She does report occasional blood on the tissue after wiping.  Colonoscopy in 2017 was normal.  On rectal exam currently there are no rectal masses or internal or external hemorrhoids felt.  If anemia recurs or blood in the tissue continues, she will need to have repeat colonoscopy done.     BMI:   Estimated body mass index is 26.39 kg/m  as calculated from the following:    Height as of this encounter: 1.689 m (5' 6.5\").    Weight as of this encounter: 75.3 kg (166 lb).     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  Ellen had transcatheter aortic valve replacement on June 21 2022.  She is currently here for preprocedure visit for transesophageal echocardiogram  to check on a small leak around her aortic valve replacement detected on recent heart transthoracic ultrasound.     I saw Meghan 2 weeks ago for preop however at that time she had bronchitis and chest x-ray showed small pneumonia.  Since then she finished antibiotics and respiratory symptoms have completely resolved.    She did develop a rash which is not itchy on her work lower arms bilaterally yesterday.  I wonder if that is a sun reaction due to recent doxycycline use.    Currently she denies any chest pains palpitations lower extremity edema or shortness of breath.  She takes metoprolol in the morning and amlodipine in the evening.  She does wear compression stockings.    No recent fevers chills, cough has resolved.    She did have persistent anemia from last year after she had her valve surgery done and has been on iron supplements.  Most recently 2 weeks ago her hemoglobin was normal and she has not been on iron since.  She does report occasional blood on the tissue after wiping.  Her last colonoscopy was in 2017 and was normal, no hemorrhoids were noted.          06/01/2023      Preop Questions   1. Have you ever had a heart attack or stroke? YES - 5/22   2. Have you ever had surgery on your heart or blood vessels, such as a stent placement, a coronary artery bypass, or surgery on an artery in your head, neck,  heart, or legs? No   3. Do you have chest pain with activity? No   4. Do you have a history of  heart failure? No   5. Do you currently have a cold, bronchitis or symptoms of other infection? YES - cold today neg for COVID   6. Do you have a cough, shortness of breath, or wheezing? No   7. Do you or anyone in your family have previous history of blood clots? UNKNOWN -    8. Do you or does anyone in your family have a serious bleeding problem such as prolonged bleeding following surgeries or cuts? No   9. Have you ever had problems with anemia or been told to take iron pills? YES -    10. Have you had any abnormal blood loss such as black, tarry or bloody stools, or abnormal vaginal bleeding? No   11. Have you ever had a blood transfusion? YES -    11a. Have you ever had a transfusion reaction? No   12. Are you willing to have a blood transfusion if it is medically needed before, during, or after your surgery? Yes   13. Have you or any of your relatives ever had problems with anesthesia? No   14. Do you have sleep apnea, excessive snoring or daytime drowsiness? No   15. Do you have any artifical heart valves or other implanted medical devices like a pacemaker, defibrillator, or continuous glucose monitor? YES -    15a. What type of device do you have? tAVER   15b. Name of the clinic that manages your device:  ?   16. Do you have artificial joints? No   17. Are you allergic to latex? No         Review of Systems  As above           Patient Active Problem List     Diagnosis Date Noted     Iron deficiency anemia due to chronic blood loss 10/26/2022       Priority: Medium     NSVT (nonsustained ventricular tachycardia) (H) 06/22/2022       Priority: Medium     Cerebrovascular accident (CVA), unspecified mechanism (H) 06/01/2022       Priority: Medium     Anemia 05/18/2022       Priority: Medium     Aphasia 05/18/2022       Priority: Medium     History of UTI gram-negative bacteremia with encephalopathy 06/12/2019        Priority: Medium     Urinary tract infection without hematuria, site unspecified         Priority: Medium     Hyponatremia 06/06/2019       Priority: Medium     Hypokalemia 06/06/2019       Priority: Medium     Acute cystitis without hematuria 06/06/2019       Priority: Medium     Irritable bowel syndrome         Priority: Medium       Created by Conversion           Major Depression, Single Episode In Remission         Priority: Medium       Created by Conversion           Primary hypertension         Priority: Medium       Created by Conversion  Replacement Utility updated for latest IMO load           Hypercholesterolemia         Priority: Medium       Created by Conversion           Generalized anxiety disorder 09/28/2017       Priority: Medium     Collagenous colitis 05/02/2017       Priority: Medium       Biopsy proven 2017           Allergic Rhinitis         Priority: Medium       Created by Conversion  Replacement Utility updated for latest IMO load           Diverticulosis         Priority: Medium       Created by Conversion  Replacement Utility updated for latest IMO load           Osteopenia         Priority: Medium       Created by Conversion  Replacement Utility updated for latest IMO load           Herpes Simplex         Priority: Medium       Created by Conversion  Replacement Utility updated for latest IMO load           Aortic valve stenosis, etiology of cardiac valve disease unspecified 11/10/2015       Priority: Medium       With moderate regurgitation.            OCD (obsessive compulsive disorder) 04/10/2015       Priority: Medium     Benign Polyps Of The Large Intestine         Priority: Medium       Created by Conversion            Past Medical History        Past Medical History:   Diagnosis Date     Aortic valve stenosis       TAVR 6/2022     Arthritis       Cerebral artery occlusion with cerebral infarction (H)       Depression       Essential hypertension       Created by Conversion   Replacement Utility updated for latest IMO load     Heart murmur       Hyperlipidemia       IBS (irritable bowel syndrome)       Iron deficiency anemia due to chronic blood loss 10/26/2022     OCD (obsessive compulsive disorder) 04/10/2015     OCD (obsessive compulsive disorder)           Past Surgical History         Past Surgical History:   Procedure Laterality Date     AORTIC VALVE SURGERY   06/2022     TAVR     HC REMOVE TONSILS/ADENOIDS,<11 Y/O         Description: Tonsillectomy With Adenoidectomy;  Recorded: 05/27/2010;         Current Outpatient Prescriptions          Current Outpatient Medications   Medication Sig Dispense Refill     acetaminophen (TYLENOL) 325 MG tablet [ACETAMINOPHEN (TYLENOL) 325 MG TABLET] Take 2 tablets (650 mg total) by mouth every 6 (six) hours as needed.   0     amLODIPine (NORVASC) 2.5 MG tablet TAKE 1 TABLET (2.5 MG) BY MOUTH DAILY 90 tablet 11     aspirin (ASA) 81 MG EC tablet Take 1 tablet (81 mg) by mouth daily         FEROSUL 325 (65 Fe) MG tablet TAKE 1 TABLET (325 MG) BY MOUTH DAILY (WITH LUNCH) 90 tablet 1     metoprolol succinate ER (TOPROL XL) 100 MG 24 hr tablet Take 1 tablet (100 mg) by mouth daily         pantoprazole (PROTONIX) 20 MG EC tablet TAKE 1 TABLET (20 MG) BY MOUTH 2 TIMES DAILY 20 MINUTES BEFORE EATING (Patient taking differently: Take 20 mg by mouth daily 20 minutes before eating) 180 tablet 3     PARoxetine (PAXIL) 30 MG tablet Take 1 tablet by mouth 2 times daily         rosuvastatin (CRESTOR) 10 MG tablet TAKE 1 TABLET (10 MG) BY MOUTH DAILY 90 tablet 1     amoxicillin (AMOXIL) 500 MG capsule Take 2,000 mg by mouth See Admin Instructions (Patient not taking: Reported on 5/19/2023)                      Allergies   Allergen Reactions     Iron Dextran Swelling     Hydrochlorothiazide Other (See Comments)       Hyponatremia, hypokalemia     Lactose Diarrhea     Sertraline Diarrhea     Bactrim [Sulfamethoxazole-Trimethoprim] Hives and Rash         Social  "History            Tobacco Use     Smoking status: Never     Smokeless tobacco: Never   Vaping Use     Vaping status: Not on file   Substance Use Topics     Alcohol use: Yes       Alcohol/week: 0.8 standard drinks of alcohol       Comment: Alcoholic Drinks/day: 1 glass of red wine         Review of Systems   As above, no dysuria vaginal itching after completion of antibiotics      Objective    /62 (BP Location: Left arm, Patient Position: Sitting, Cuff Size: Adult Regular)   Pulse 62   Temp 98.2  F (36.8  C) (Oral)   Resp 18   Ht 1.689 m (5' 6.5\")   Wt 75.3 kg (166 lb)   LMP  (LMP Unknown)   SpO2 94%   BMI 26.39 kg/m    Body mass index is 26.39 kg/m .  Physical Exam   General: well appearing female, alert and oriented x3  EYES: Eyelids, conjunctiva, and sclera were normal. Pupils were normal.   HEAD, EARS, NOSE, MOUTH, AND THROAT: no cervical LAD, no thyromegaly or nodules appreciated. TMs are visualized and normal, oropharynx is clear.  RESPIRATORY: respirations non labored, CTA bl, no wheezes, rales, no forced expiratory wheezing.  CARDIOVASCULAR: Heart rate and rhythm were normal. No murmurs, rubs,gallops. There was no peripheral edema. No carotid bruits.  GASTROINTESTINAL: Positive bowel sounds, abdomen is soft, non tender, non distended.     MUSCULOSKELETAL: Muscle mass was normal for age. No joint synovitis or deformity.  LYMPHATIC: There were no enlarged nodes palpable.  SKIN/HAIR/NAILS: Skin color was normal.  No rashes.  NEUROLOGIC: The patient was alert and oriented.  Speech was normal.  There is no facial asymmetry.   PSYCHIATRIC:  Mood and affect were normal.   Rectal exam: No external hemorrhoids, no masses on internal palpation, no blood on the glove, no internal hemorrhoids felt.        "

## 2023-06-01 NOTE — PATIENT INSTRUCTIONS
Repeat chest XR around July 19 th to document resolution of abnormal findings.    2. O'K to tale metoprolol and Metoprolol either 4 hours prior to procedure or after procedure.     3. Would need to do colonoscopy in the future due to blood in the stool.

## 2023-06-04 DIAGNOSIS — I10 PRIMARY HYPERTENSION: ICD-10-CM

## 2023-06-04 RX ORDER — AMLODIPINE BESYLATE 2.5 MG/1
2.5 TABLET ORAL DAILY
Qty: 90 TABLET | Refills: 3 | Status: SHIPPED | OUTPATIENT
Start: 2023-06-04 | End: 2023-10-27

## 2023-06-04 NOTE — TELEPHONE ENCOUNTER
"Routing refill request to provider for review/approval because:  Labs out of range:  Cr    Last Written Prescription Date:  4/15/2022  Last Fill Quantity: 90,  # refills: 11   Last office visit provider:  6/1/2023     Requested Prescriptions   Pending Prescriptions Disp Refills     amLODIPine (NORVASC) 2.5 MG tablet [Pharmacy Med Name: amlodipine 2.5 mg tablet] 90 tablet 11     Sig: TAKE 1 TABLET (2.5 MG) BY MOUTH DAILY       Calcium Channel Blockers Protocol  Failed - 6/4/2023 12:17 PM        Failed - Normal serum creatinine on file in past 12 months     Recent Labs   Lab Test 05/19/23  1334   CR 1.00*       Ok to refill medication if creatinine is low          Passed - Blood pressure under 140/90 in past 12 months     BP Readings from Last 3 Encounters:   06/01/23 118/62   05/19/23 122/64   02/24/23 108/58                 Passed - Recent (12 mo) or future (30 days) visit within the authorizing provider's specialty     Patient has had an office visit with the authorizing provider or a provider within the authorizing providers department within the previous 12 mos or has a future within next 30 days. See \"Patient Info\" tab in inbasket, or \"Choose Columns\" in Meds & Orders section of the refill encounter.              Passed - Medication is active on med list        Passed - Patient is age 18 or older        Passed - No active pregnancy on record        Passed - No positive pregnancy test in past 12 months             Melvina Conn RN 06/04/23 12:18 PM  "

## 2023-06-07 ENCOUNTER — TRANSFERRED RECORDS (OUTPATIENT)
Dept: HEALTH INFORMATION MANAGEMENT | Facility: CLINIC | Age: 83
End: 2023-06-07
Payer: MEDICARE

## 2023-07-06 ENCOUNTER — OFFICE VISIT (OUTPATIENT)
Dept: INTERNAL MEDICINE | Facility: CLINIC | Age: 83
End: 2023-07-06
Payer: MEDICARE

## 2023-07-06 VITALS
WEIGHT: 167 LBS | OXYGEN SATURATION: 98 % | HEIGHT: 66 IN | RESPIRATION RATE: 14 BRPM | HEART RATE: 73 BPM | BODY MASS INDEX: 26.84 KG/M2 | TEMPERATURE: 98.6 F | SYSTOLIC BLOOD PRESSURE: 132 MMHG | DIASTOLIC BLOOD PRESSURE: 74 MMHG

## 2023-07-06 DIAGNOSIS — M26.609 TMJ (TEMPOROMANDIBULAR JOINT SYNDROME): ICD-10-CM

## 2023-07-06 DIAGNOSIS — D50.9 IRON DEFICIENCY ANEMIA, UNSPECIFIED IRON DEFICIENCY ANEMIA TYPE: ICD-10-CM

## 2023-07-06 DIAGNOSIS — R60.9 EDEMA, UNSPECIFIED TYPE: ICD-10-CM

## 2023-07-06 DIAGNOSIS — Z95.2 S/P TAVR (TRANSCATHETER AORTIC VALVE REPLACEMENT): Primary | ICD-10-CM

## 2023-07-06 DIAGNOSIS — J18.9 PNEUMONIA DUE TO INFECTIOUS ORGANISM, UNSPECIFIED LATERALITY, UNSPECIFIED PART OF LUNG: ICD-10-CM

## 2023-07-06 PROCEDURE — 99214 OFFICE O/P EST MOD 30 MIN: CPT | Performed by: INTERNAL MEDICINE

## 2023-07-06 RX ORDER — SERTRALINE HYDROCHLORIDE 100 MG/1
TABLET, FILM COATED ORAL
COMMUNITY
Start: 2023-06-07 | End: 2023-10-27

## 2023-07-06 ASSESSMENT — PAIN SCALES - GENERAL: PAINLEVEL: NO PAIN (0)

## 2023-07-06 NOTE — PROGRESS NOTES
"  Assessment & Plan     S/P TAVR (transcatheter aortic valve replacement)  Mild periprostatic leak noted on transthoracic echo, transesophageal echocardiogram shows mild leak as well, not worse.  She will follow with us cardiology.    TMJ (temporomandibular joint syndrome)  Discussed avoid biting on hard foods and if symptoms recur, she can use Voltaren gel topically.    Pneumonia due to infectious organism, unspecified laterality, unspecified part of lung  Had slight pneumonia on chest x-ray in May, will need to have repeat chest x-ray done in August to document resolution.  - XR Chest 2 Views; Future    Edema, unspecified type  Very slight edema and very well controlled with compression stockings, refill was provided at  - Compression Sleeve/Stocking Order for DME - ONLY FOR DME    Iron deficiency anemia, unspecified iron deficiency anemia type  She has been on aspirin since her TAVR surgery and has had anemia postoperatively which had taking a long time to resolve.  She was started on pantoprazole and iron.  Currently she is off iron and asking whether she needs to continue on pantoprazole.  Hemoglobin level was normal in May.  Discussed to start taking pantoprazole every other day while she is on aspirin.    Depression.  She is followed by psychiatry.  She did suffer small stroke but no lasting neurological deficits.  Most recently Paxil was stopped in May and she was started on sertraline.  She is doing 50 mg of sertraline and mood is good.  Moving to senior apartment living has also been helpful but they still need to sell their house which is slightly stressful.     BMI:   Estimated body mass index is 26.55 kg/m  as calculated from the following:    Height as of this encounter: 1.689 m (5' 6.5\").    Weight as of this encounter: 75.8 kg (167 lb).     Brenda Godoy MD  [facility]    Subjective   Meghan is a 82 year old, presenting for the following health issues:  Follow Up " (Review 6/14/23 testing at [facility]) and Recheck Medication (Pt would like to discuss Zoloft rx. 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  Ellen had transcatheter aortic valve replacement on June 21 2022.  On recent transthoracic echocardiogram she was noted to have a small periprostatic leak and most recently she underwent transesophageal echocardiogram.  Cardiology office did not contact her with results and she wanted to review it with me.    MARIAN results were reviewed, she has mild periprosthetic regurgitation (9% area of regurgitation/circumference of bioprosthesis in the area of native left coronary cusp.  No thrombus.  Currently she is asymptomatic.  She will continue following up with cardiology and will contact them to also comment on the result.    She and her  recently moved to [place] and is still in the need of selling their house.  She has been busy clearing stuff out of the garage in the house.    She is followed by psychiatrist and Paxil was stopped in May.  She is currently on Zoloft only 50 mg a day and mood has been good.    She had an episode of left ear pain radiating into her jaw which currently has resolved.  Discussed that most likely it was TMJ.    History of Present Illness       Reason for visit:  Li    She eats 2-3 servings of fruits and vegetables daily.She consumes 0 sweetened beverage(s) daily.She exercises with enough effort to increase her heart rate 10 to 19 minutes per day.  She exercises with enough effort to increase her heart rate 3 or less  "days per week.   She is taking medications regularly.         Review of Systems   As above      Objective    /74 (BP Location: Left arm, Patient Position: Sitting, Cuff Size: Adult Regular)   Pulse 73   Temp 98.6  F (37  C) (Tympanic)   Resp 14   Ht 1.689 m (5' 6.5\")   Wt 75.8 kg (167 lb)   LMP  (LMP Unknown)   SpO2 98%   BMI 26.55 kg/m    Body mass index is 26.55 kg/m .  Physical Exam       General: well appearing female, alert and oriented x3  EYES: Eyelids, conjunctiva, and sclera were normal. Pupils were normal.   HEAD, EARS, NOSE, MOUTH, AND THROAT: no cervical LAD, no thyromegaly or nodules appreciated. TMs are visualized and normal, oropharynx is clear.  RESPIRATORY: respirations non labored, CTA bl, no wheezes, rales, no forced expiratory wheezing.  CARDIOVASCULAR: Heart rate and rhythm were normal. No murmurs, rubs,gallops. There was no peripheral edema. No carotid bruits.  GASTROINTESTINAL: Positive bowel sounds, abdomen is soft, non tender, non distended.     MUSCULOSKELETAL: Muscle mass was normal for age. No joint synovitis or deformity.  LYMPHATIC: There were no enlarged nodes palpable.  SKIN/HAIR/NAILS: Skin color was normal.  No rashes.  NEUROLOGIC: The patient was alert and oriented.  Speech was normal.  There is no facial asymmetry.   PSYCHIATRIC:  Mood and affect were normal.            "

## 2023-07-06 NOTE — PATIENT INSTRUCTIONS
MARIAN showed mild alix-prosthetic regurgitation/leak , not worse then seen on regular echocardiogram. This is very mild. Please ask cardiologist to comment on rsults.    2. Ear pain is most likely due to TMJ. Avoid biting on hard things, can try Voltaren gel topically if jaw is irritated.     3. Have repeat CXR done mid August to make sure lung pneumonia is all cleared up.    4. Get covid booster in the fall.    5. Decrease Pantoprazole to one tab every other day    6. Continue aspirin and Crestor long term

## 2023-09-07 ENCOUNTER — TELEPHONE (OUTPATIENT)
Dept: INTERNAL MEDICINE | Facility: CLINIC | Age: 83
End: 2023-09-07

## 2023-09-07 ENCOUNTER — OFFICE VISIT (OUTPATIENT)
Dept: INTERNAL MEDICINE | Facility: CLINIC | Age: 83
End: 2023-09-07
Payer: MEDICARE

## 2023-09-07 VITALS
OXYGEN SATURATION: 96 % | HEIGHT: 66 IN | TEMPERATURE: 97.8 F | RESPIRATION RATE: 17 BRPM | WEIGHT: 165.4 LBS | HEART RATE: 70 BPM | DIASTOLIC BLOOD PRESSURE: 59 MMHG | BODY MASS INDEX: 26.58 KG/M2 | SYSTOLIC BLOOD PRESSURE: 123 MMHG

## 2023-09-07 DIAGNOSIS — R19.7 DIARRHEA, UNSPECIFIED TYPE: Primary | ICD-10-CM

## 2023-09-07 DIAGNOSIS — Z23 NEED FOR VACCINATION: ICD-10-CM

## 2023-09-07 DIAGNOSIS — R53.83 OTHER FATIGUE: ICD-10-CM

## 2023-09-07 LAB
BASOPHILS # BLD AUTO: 0 10E3/UL (ref 0–0.2)
BASOPHILS NFR BLD AUTO: 1 %
EOSINOPHIL # BLD AUTO: 0.1 10E3/UL (ref 0–0.7)
EOSINOPHIL NFR BLD AUTO: 2 %
ERYTHROCYTE [DISTWIDTH] IN BLOOD BY AUTOMATED COUNT: 12.9 % (ref 10–15)
HCT VFR BLD AUTO: 36.8 % (ref 35–47)
HGB BLD-MCNC: 11.8 G/DL (ref 11.7–15.7)
IMM GRANULOCYTES # BLD: 0 10E3/UL
IMM GRANULOCYTES NFR BLD: 0 %
LYMPHOCYTES # BLD AUTO: 2 10E3/UL (ref 0.8–5.3)
LYMPHOCYTES NFR BLD AUTO: 26 %
MCH RBC QN AUTO: 29.1 PG (ref 26.5–33)
MCHC RBC AUTO-ENTMCNC: 32.1 G/DL (ref 31.5–36.5)
MCV RBC AUTO: 91 FL (ref 78–100)
MONOCYTES # BLD AUTO: 0.7 10E3/UL (ref 0–1.3)
MONOCYTES NFR BLD AUTO: 9 %
NEUTROPHILS # BLD AUTO: 4.9 10E3/UL (ref 1.6–8.3)
NEUTROPHILS NFR BLD AUTO: 63 %
PLATELET # BLD AUTO: 200 10E3/UL (ref 150–450)
RBC # BLD AUTO: 4.06 10E6/UL (ref 3.8–5.2)
WBC # BLD AUTO: 7.7 10E3/UL (ref 4–11)

## 2023-09-07 PROCEDURE — 90662 IIV NO PRSV INCREASED AG IM: CPT | Performed by: INTERNAL MEDICINE

## 2023-09-07 PROCEDURE — 84443 ASSAY THYROID STIM HORMONE: CPT | Performed by: INTERNAL MEDICINE

## 2023-09-07 PROCEDURE — 99214 OFFICE O/P EST MOD 30 MIN: CPT | Mod: 25 | Performed by: INTERNAL MEDICINE

## 2023-09-07 PROCEDURE — 36415 COLL VENOUS BLD VENIPUNCTURE: CPT | Performed by: INTERNAL MEDICINE

## 2023-09-07 PROCEDURE — 85025 COMPLETE CBC W/AUTO DIFF WBC: CPT | Performed by: INTERNAL MEDICINE

## 2023-09-07 PROCEDURE — G0008 ADMIN INFLUENZA VIRUS VAC: HCPCS | Performed by: INTERNAL MEDICINE

## 2023-09-07 PROCEDURE — 80048 BASIC METABOLIC PNL TOTAL CA: CPT | Performed by: INTERNAL MEDICINE

## 2023-09-07 ASSESSMENT — PATIENT HEALTH QUESTIONNAIRE - PHQ9
10. IF YOU CHECKED OFF ANY PROBLEMS, HOW DIFFICULT HAVE THESE PROBLEMS MADE IT FOR YOU TO DO YOUR WORK, TAKE CARE OF THINGS AT HOME, OR GET ALONG WITH OTHER PEOPLE: SOMEWHAT DIFFICULT
SUM OF ALL RESPONSES TO PHQ QUESTIONS 1-9: 5
SUM OF ALL RESPONSES TO PHQ QUESTIONS 1-9: 5

## 2023-09-07 ASSESSMENT — PAIN SCALES - GENERAL: PAINLEVEL: NO PAIN (0)

## 2023-09-07 NOTE — PATIENT INSTRUCTIONS
If still having anemia or BM patterns are changing, will need to do colonoscopy.    Labs today    O'K to continue loperamide for diarrhea    Flu shot today.    Follow up with dermatology due to history of skin cancer.    Make sure psychiatrist knows about your bowel problems/diarrhea. In some people  Sertraline can cause diarrhea.  ( May ne add Wellbutrin or Buspirone to Zoloft instead of increasing the dose due to possible side effect of diarrhea on higher dose).

## 2023-09-07 NOTE — LETTER
September 8, 2023      Meghan Felipe  [address] [place] [address]        Dear ,    We are writing to inform you of your test results.    Thyroid function is normal.    Anemia has resolved.    Kidney function is stable and electrolytes are good.    If diarrhea ius getting worse, we may consider referral to GI.      Resulted Orders   TSH with free T4 reflex   Result Value Ref Range    TSH 3.84 0.30 - 4.20 uIU/mL   Basic metabolic panel  (Ca, Cl, CO2, Creat, Gluc, K, Na, BUN)   Result Value Ref Range    Sodium 140 136 - 145 mmol/L    Potassium 4.3 3.4 - 5.3 mmol/L    Chloride 107 98 - 107 mmol/L    Carbon Dioxide (CO2) 23 22 - 29 mmol/L    Anion Gap 10 7 - 15 mmol/L    Urea Nitrogen 26.5 (H) 8.0 - 23.0 mg/dL    Creatinine 1.09 (H) 0.51 - 0.95 mg/dL    Calcium 9.5 8.8 - 10.2 mg/dL    Glucose 84 70 - 99 mg/dL    GFR Estimate 50 (L) >60 mL/min/1.73m2   CBC with platelets and differential   Result Value Ref Range    WBC Count 7.7 4.0 - 11.0 10e3/uL    RBC Count 4.06 3.80 - 5.20 10e6/uL    Hemoglobin 11.8 11.7 - 15.7 g/dL    Hematocrit 36.8 35.0 - 47.0 %    MCV 91 78 - 100 fL    MCH 29.1 26.5 - 33.0 pg    MCHC 32.1 31.5 - 36.5 g/dL    RDW 12.9 10.0 - 15.0 %    Platelet Count 200 150 - 450 10e3/uL    % Neutrophils 63 %    % Lymphocytes 26 %    % Monocytes 9 %    % Eosinophils 2 %    % Basophils 1 %    % Immature Granulocytes 0 %    Absolute Neutrophils 4.9 1.6 - 8.3 10e3/uL    Absolute Lymphocytes 2.0 0.8 - 5.3 10e3/uL    Absolute Monocytes 0.7 0.0 - 1.3 10e3/uL    Absolute Eosinophils 0.1 0.0 - 0.7 10e3/uL    Absolute Basophils 0.0 0.0 - 0.2 10e3/uL    Absolute Immature Granulocytes 0.0 <=0.4 10e3/uL       If you have any questions or concerns, please call the clinic at the number listed above.       Sincerely,      Brenda Godoy MD

## 2023-09-07 NOTE — PROGRESS NOTES
"  Assessment & Plan     Diarrhea, unspecified type  This is on and off, overall she denies any big changes in her pattern, she uses loperamide almost daily which helps control her symptoms.  She denies any blood in the stool her has had intermittent anemia.  She is on aspirin daily due to TAVR surgery.  Endoscopy in February of this year was normal.  Discussed if she is still anemic, I would recommend having colonoscopy done.    Her Paxil was changed to Zoloft recently.  Discussed that higher doses of Zoloft can cause diarrhea and make sure that her psychiatrist is aware of her GI issues.  -- TSH with free T4 reflex  - CBC with platelets and differential  - Basic metabolic panel  (Ca, Cl, CO2, Creat, Gluc, K, Na, BUN)    Other fatigue  - TSH with free T4 reflex  - CBC with platelets and differential  - Basic metabolic panel  (Ca, Cl, CO2, Creat, Gluc, K, Na, BUN)    Need for vaccination  - INFLUENZA VACCINE 65+ (FLUZONE HD)     She has history of squamous cell carcinoma, recommended to schedule follow-up with dermatology.    BMI:   Estimated body mass index is 26.3 kg/m  as calculated from the following:    Height as of this encounter: 1.689 m (5' 6.5\").    Weight as of this encounter: 75 kg (165 lb 6.4 oz).     Brenda Godoy MD  [facility]   Meghan is a 83 year old, presenting for the following health issues:  Gastrointestinal Problem (Patient has had severe diarrhea for some time.  She has sold her home and moved and this has worsened her symptoms.  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  She is currently here for follow-up.    Please has chronic IBS and intermittent diarrhea.  She usually manages it to with loperamide.  Most recently diarrhea has gotten slightly worse, she thinks that her IBS is worse due to recent move from their house to senior leaving.  She still adjusting to it.  She denies any abdominal pain or blood in the stools.  No recent antibiotics.    She does have mild anemia.  She has had endoscopy done in February of this year which was normal.  Discussed that if she continues to be anemic, I would recommend doing colonoscopy.    She is seen by psychiatrist and recently her Paxil was changed to Zoloft.  She is on 50 mg a day.  Due to stress of moving she is wondering if she should increase her dose of Zoloft further.  Discussed that certainly higher doses of Zoloft can cause worsening diarrhea so I would check with psychiatry and see if additional agent would be added and instead of increasing the dose.    Her weight has been stable.      History of Present Illness       Reason for visit:  Loose stools  Symptom onset:  More than a month  Symptoms include:  Loose stools  Symptom intensity:  Severe  Symptom progression:  Worsening  Had these symptoms before:  Yes  Has tried/received treatment for these symptoms:  Yes  What makes it worse:  Stress  What makes it better:  No    She eats 2-3 servings of fruits and vegetables daily.She consumes 0 sweetened beverage(s) daily.She exercises with enough effort to increase her heart rate 9 or less minutes per day.  She exercises with enough effort to increase her heart rate 3 or less days per week.   She is taking medications regularly.     Review of Systems   As above      Objective    /59 (BP Location: Left arm, Patient Position: Sitting, Cuff Size: Adult Regular)   Pulse 70   Temp 97.8  F (36.6  C) (Tympanic)   Resp 17   Ht  "1.689 m (5' 6.5\")   Wt 75 kg (165 lb 6.4 oz)   LMP  (LMP Unknown)   SpO2 96%   BMI 26.30 kg/m    Body mass index is 26.3 kg/m .  Physical Exam   General: well appearing female, alert and oriented x3  EYES: Eyelids, conjunctiva, and sclera were normal. Pupils were normal.   HEAD, EARS, NOSE, MOUTH, AND THROAT: no cervical LAD, no thyromegaly or nodules appreciated. TMs are visualized and normal, oropharynx is clear.  RESPIRATORY: respirations non labored, CTA bl, no wheezes, rales, no forced expiratory wheezing.  CARDIOVASCULAR: Heart rate and rhythm were normal. No murmurs, rubs,gallops. There was no peripheral edema.   GASTROINTESTINAL: Positive bowel sounds, abdomen is soft, non tender, non distended.     MUSCULOSKELETAL: Muscle mass was normal for age. No joint synovitis or deformity.  LYMPHATIC: There were no enlarged nodes palpable.  SKIN/HAIR/NAILS: Skin color was normal.  No rashes.  NEUROLOGIC: The patient was alert and oriented.  Speech was normal.  There is no facial asymmetry.   PSYCHIATRIC:  Mood and affect were normal.        Answers submitted by the patient for this visit:  Patient Health Questionnaire (Submitted on 9/7/2023)  If you checked off any problems, how difficult have these problems made it for you to do your work, take care of things at home, or get along with other people?: Somewhat difficult  PHQ9 TOTAL SCORE: 5  General Questionnaire (Submitted on 9/7/2023)  Chief Complaint: Chronic problems general questions HPI Form  How many servings of fruits and vegetables do you eat daily?: 2-3  On average, how many sweetened beverages do you drink each day (Examples: soda, juice, sweet tea, etc.  Do NOT count diet or artificially sweetened beverages)?: 0  How many minutes a day do you exercise enough to make your heart beat faster?: 9 or less  How many days a week do you exercise enough to make your heart beat faster?: 3 or less  How many days per week do you miss taking your medication?: " 0  General Concern (Submitted on 9/7/2023)  Chief Complaint: Chronic problems general questions HPI Form  What is the reason for your visit today?: loose stools  When did your symptoms begin?: More than a month  What are your symptoms?: loose stools  How would you describe these symptoms?: Severe  Are your symptoms:: Worsening  Have you had these symptoms before?: Yes  Have you tried or received treatment for these symptoms before?: Yes  Is there anything that makes you feel worse?: stress  Is there anything that makes you feel better?: no

## 2023-09-08 LAB
ANION GAP SERPL CALCULATED.3IONS-SCNC: 10 MMOL/L (ref 7–15)
BUN SERPL-MCNC: 26.5 MG/DL (ref 8–23)
CALCIUM SERPL-MCNC: 9.5 MG/DL (ref 8.8–10.2)
CHLORIDE SERPL-SCNC: 107 MMOL/L (ref 98–107)
CREAT SERPL-MCNC: 1.09 MG/DL (ref 0.51–0.95)
DEPRECATED HCO3 PLAS-SCNC: 23 MMOL/L (ref 22–29)
EGFRCR SERPLBLD CKD-EPI 2021: 50 ML/MIN/1.73M2
GLUCOSE SERPL-MCNC: 84 MG/DL (ref 70–99)
POTASSIUM SERPL-SCNC: 4.3 MMOL/L (ref 3.4–5.3)
SODIUM SERPL-SCNC: 140 MMOL/L (ref 136–145)
TSH SERPL DL<=0.005 MIU/L-ACNC: 3.84 UIU/ML (ref 0.3–4.2)

## 2023-10-27 ENCOUNTER — OFFICE VISIT (OUTPATIENT)
Dept: INTERNAL MEDICINE | Facility: CLINIC | Age: 83
End: 2023-10-27
Payer: MEDICARE

## 2023-10-27 VITALS
DIASTOLIC BLOOD PRESSURE: 71 MMHG | BODY MASS INDEX: 25.74 KG/M2 | RESPIRATION RATE: 16 BRPM | HEIGHT: 67 IN | OXYGEN SATURATION: 98 % | HEART RATE: 63 BPM | TEMPERATURE: 98 F | WEIGHT: 164 LBS | SYSTOLIC BLOOD PRESSURE: 121 MMHG

## 2023-10-27 DIAGNOSIS — I63.9 CEREBROVASCULAR ACCIDENT (CVA), UNSPECIFIED MECHANISM (H): ICD-10-CM

## 2023-10-27 DIAGNOSIS — I10 PRIMARY HYPERTENSION: ICD-10-CM

## 2023-10-27 DIAGNOSIS — E78.5 HYPERLIPIDEMIA LDL GOAL <100: ICD-10-CM

## 2023-10-27 DIAGNOSIS — F32.5 MAJOR DEPRESSIVE DISORDER, SINGLE EPISODE IN FULL REMISSION (H): ICD-10-CM

## 2023-10-27 DIAGNOSIS — E55.9 VITAMIN D DEFICIENCY: ICD-10-CM

## 2023-10-27 DIAGNOSIS — Z00.00 ENCOUNTER FOR MEDICARE ANNUAL WELLNESS EXAM: Primary | ICD-10-CM

## 2023-10-27 DIAGNOSIS — Z95.2 S/P TAVR (TRANSCATHETER AORTIC VALVE REPLACEMENT): ICD-10-CM

## 2023-10-27 PROBLEM — I47.29 NSVT (NONSUSTAINED VENTRICULAR TACHYCARDIA) (H): Status: RESOLVED | Noted: 2022-06-22 | Resolved: 2023-10-27

## 2023-10-27 LAB
ALBUMIN SERPL BCG-MCNC: 4.7 G/DL (ref 3.5–5.2)
ALP SERPL-CCNC: 90 U/L (ref 35–104)
ALT SERPL W P-5'-P-CCNC: 31 U/L (ref 0–50)
AST SERPL W P-5'-P-CCNC: 36 U/L (ref 0–45)
BILIRUB DIRECT SERPL-MCNC: <0.2 MG/DL (ref 0–0.3)
BILIRUB SERPL-MCNC: 0.3 MG/DL
CHOLEST SERPL-MCNC: 132 MG/DL
HDLC SERPL-MCNC: 60 MG/DL
LDLC SERPL CALC-MCNC: 51 MG/DL
NONHDLC SERPL-MCNC: 72 MG/DL
PROT SERPL-MCNC: 7.9 G/DL (ref 6.4–8.3)
TRIGL SERPL-MCNC: 104 MG/DL
VIT D+METAB SERPL-MCNC: 55 NG/ML (ref 20–50)

## 2023-10-27 PROCEDURE — 80076 HEPATIC FUNCTION PANEL: CPT | Performed by: INTERNAL MEDICINE

## 2023-10-27 PROCEDURE — 36415 COLL VENOUS BLD VENIPUNCTURE: CPT | Performed by: INTERNAL MEDICINE

## 2023-10-27 PROCEDURE — 80061 LIPID PANEL: CPT | Performed by: INTERNAL MEDICINE

## 2023-10-27 PROCEDURE — 96127 BRIEF EMOTIONAL/BEHAV ASSMT: CPT | Performed by: INTERNAL MEDICINE

## 2023-10-27 PROCEDURE — 82306 VITAMIN D 25 HYDROXY: CPT | Performed by: INTERNAL MEDICINE

## 2023-10-27 PROCEDURE — 99397 PER PM REEVAL EST PAT 65+ YR: CPT | Performed by: INTERNAL MEDICINE

## 2023-10-27 PROCEDURE — 99214 OFFICE O/P EST MOD 30 MIN: CPT | Mod: 25 | Performed by: INTERNAL MEDICINE

## 2023-10-27 RX ORDER — RESPIRATORY SYNCYTIAL VIRUS VACCINE 120MCG/0.5
0.5 KIT INTRAMUSCULAR ONCE
Qty: 1 EACH | Refills: 0 | Status: CANCELLED | OUTPATIENT
Start: 2023-10-27 | End: 2023-10-27

## 2023-10-27 ASSESSMENT — PATIENT HEALTH QUESTIONNAIRE - PHQ9
10. IF YOU CHECKED OFF ANY PROBLEMS, HOW DIFFICULT HAVE THESE PROBLEMS MADE IT FOR YOU TO DO YOUR WORK, TAKE CARE OF THINGS AT HOME, OR GET ALONG WITH OTHER PEOPLE: NOT DIFFICULT AT ALL
SUM OF ALL RESPONSES TO PHQ QUESTIONS 1-9: 1
SUM OF ALL RESPONSES TO PHQ QUESTIONS 1-9: 1

## 2023-10-27 ASSESSMENT — ENCOUNTER SYMPTOMS
DIZZINESS: 0
ABDOMINAL PAIN: 0
HEMATOCHEZIA: 0
SORE THROAT: 0
HEMATURIA: 0
BREAST MASS: 0
CONSTIPATION: 0
JOINT SWELLING: 0
COUGH: 0
HEADACHES: 0
SHORTNESS OF BREATH: 0
NERVOUS/ANXIOUS: 0
DYSURIA: 0
FREQUENCY: 0
FEVER: 0
MYALGIAS: 0
PALPITATIONS: 0
DIARRHEA: 0
NAUSEA: 0
ARTHRALGIAS: 1
CHILLS: 0
EYE PAIN: 0
PARESTHESIAS: 0
HEARTBURN: 0
WEAKNESS: 0

## 2023-10-27 ASSESSMENT — ACTIVITIES OF DAILY LIVING (ADL): CURRENT_FUNCTION: NO ASSISTANCE NEEDED

## 2023-10-27 NOTE — LETTER
October 30, 2023      Meghan N Matteo  [address]    [place] [address]        Dear ,    We are writing to inform you of your test results.    Meghan,    Vitamin D level is good.  Cholesterol level is excellent.  Her functions are normal.    K[place]    Resulted Orders   Vitamin D Deficiency   Result Value Ref Range    Vitamin D, Total (25-Hydroxy) 55 (H) 20 - 50 ng/mL      Comment:      indicates supplementation, with increased risk of hypercalciuria    Narrative    Season, race, dietary intake, and treatment affect the concentration of 25-hydroxy-Vitamin D. Values may decrease during winter months and increase during summer months.    Vitamin D determination is routinely performed by an immunoassay specific for 25 hydroxyvitamin D3.  If an individual is on vitamin D2(ergocalciferol) supplementation, please specify 25 OH vitamin D2 and D3 level determination by LCMSMS test VITD23.     Lipid panel reflex to direct LDL Fasting   Result Value Ref Range    Cholesterol 132 <200 mg/dL    Triglycerides 104 <150 mg/dL    Direct Measure HDL 60 >=50 mg/dL    LDL Cholesterol Calculated 51 <=100 mg/dL    Non HDL Cholesterol 72 <130 mg/dL    Narrative    Cholesterol  Desirable:  <200 mg/dL    Triglycerides  Normal:  Less than 150 mg/dL  Borderline High:  150-199 mg/dL  High:  200-499 mg/dL  Very High:  Greater than or equal to 500 mg/dL    Direct Measure HDL  Female:  Greater than or equal to 50 mg/dL   Male:  Greater than or equal to 40 mg/dL    LDL Cholesterol  Desirable:  <100mg/dL  Above Desirable:  100-129 mg/dL   Borderline High:  130-159 mg/dL   High:  160-189 mg/dL   Very High:  >= 190 mg/dL    Non HDL Cholesterol  Desirable:  130 mg/dL  Above Desirable:  130-159 mg/dL  Borderline High:  160-189 mg/dL  High:  190-219 mg/dL  Very High:  Greater than or equal to 220 mg/dL   Hepatic panel (Albumin, ALT, AST, Bili, Alk Phos, TP)   Result Value Ref Range    Protein Total 7.9 6.4 - 8.3 g/dL    Albumin 4.7 3.5 - 5.2  g/dL    Bilirubin Total 0.3 <=1.2 mg/dL    Alkaline Phosphatase 90 35 - 104 U/L    AST 36 0 - 45 U/L      Comment:      Reference intervals for this test were updated on 6/12/2023 to more accurately reflect our healthy population. There may be differences in the flagging of prior results with similar values performed with this method. Interpretation of those prior results can be made in the context of the updated reference intervals.    ALT 31 0 - 50 U/L      Comment:      Reference intervals for this test were updated on 6/12/2023 to more accurately reflect our healthy population. There may be differences in the flagging of prior results with similar values performed with this method. Interpretation of those prior results can be made in the context of the updated reference intervals.      Bilirubin Direct <0.20 0.00 - 0.30 mg/dL       If you have any questions or concerns, please call the clinic at the number listed above.       Sincerely,      Brenda Godoy MD

## 2023-10-27 NOTE — PROGRESS NOTES
SUBJECTIVE:   Meghan is a 83 year old who presents for Preventive Visit.      10/27/2023     3:01 PM   Additional Questions   Roomed by REY   Accompanied by ALONE         10/27/2023     3:01 PM   Patient Reported Additional Medications   Patient reports taking the following new medications NONE       Are you in the first 12 months of your Medicare coverage?  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  She is currently here for a wellness exam.    Overall Etelvina has been doing well.  Most recently she stopped her sertraline 3 weeks ago due to diarrhea and mood has been good and diarrhea has resolved.  She does have appointment scheduled to follow-up with her psychiatrist on November 16.  Discussed to stay in close touch with psychiatrist over the weekend to her to make sure that her depression does not recur.  It would be good if they had a alternative plan of medication should her symptoms of depression gets worse but she is doing well so far.    Currently she takes Metamucil, vitamin B, D3 calcium and mild to vitamin for supplements.    Her knee has been doing well and she has been delaying her knee replacement surgery.  No swelling, she and her  go to [employer] and exercise there regularly and she does some weights and aerobic exercises.  She denies any chest pains palpitations or shortness of breath.    Blood pressure, heart rate and weight are all good.    She wears glasses and sees ophthalmologist annually.  No problems with hearing or recent falls.    She has had CVA in 2022, has not had any recurrent strokes and currently takes  "aspirin and she is on Crestor.  She did remember 3 words today which is excellent.      Healthy Habits:     In general, how would you rate your overall health?  Good    Frequency of exercise:  2-3 days/week    Duration of exercise:  30-45 minutes    Do you usually eat at least 4 servings of fruit and vegetables a day, include whole grains    & fiber and avoid regularly eating high fat or \"junk\" foods?  Yes    Taking medications regularly:  Yes    Medication side effects:  None    Ability to successfully perform activities of daily living:  No assistance needed    Home Safety:  No safety concerns identified    Hearing Impairment:  No hearing concerns    In the past 6 months, have you been bothered by leaking of urine? Yes    In general, how would you rate your overall mental or emotional health?  Good    Additional concerns today:  Yes      Today's PHQ-9 Score:       10/27/2023     2:47 PM   PHQ-9 SCORE   PHQ-9 Total Score MyChart 1 (Minimal depression)   PHQ-9 Total Score 1           Have you ever done Advance Care Planning? (For example, a Health Directive, POLST, or a discussion with a medical provider or your loved ones about your wishes): No, advance care planning information given to patient to review.  Patient declined advance care planning discussion at this time.  Fall risk  Fallen 2 or more times in the past year?: No  Any fall with injury in the past year?: No    Cognitive Screening   1) Repeat 3 items (Leader, Season, Table)    2) Clock draw: NORMAL  3) 3 item recall: Recalls 3 objects  Results: 3 items recalled: COGNITIVE IMPAIRMENT LESS LIKELY    Mini-CogTM Copyright MARYBETH Avalos. Licensed by the author for use in Health system; reprinted with permission (arianna@.[facility]). All rights reserved.      Do you have sleep apnea, excessive snoring or daytime drowsiness? : no    Reviewed and updated as needed this visit by clinical staff   Tobacco  Allergies  Meds              Reviewed and updated as " needed this visit by Provider                 Social History     Tobacco Use    Smoking status: Never    Smokeless tobacco: Never   Substance Use Topics    Alcohol use: Yes     Alcohol/week: 0.8 standard drinks of alcohol     Comment: Alcoholic Drinks/day: 1 glass of red wine           10/27/2023     3:00 PM   Alcohol Use   Prescreen: >3 drinks/day or >7 drinks/week? No     Do you have a current opioid prescription? No  Do you use any other controlled substances or medications that are not prescribed by a provider? None      Current providers sharing in care for this patient include: Patient Care Team:  Brenda Godoy MD as PCP - General (Internal Medicine)  Brenda Godoy MD as Assigned PCP    The following health maintenance items are reviewed in Epic and correct as of today:  Health Maintenance   Topic Date Due    RSV VACCINE 60+ (1 - 1-dose 60+ series) Never done    ANNUAL REVIEW OF HM ORDERS  10/11/2022    MEDICARE ANNUAL WELLNESS VISIT  01/20/2024    PHQ-9  04/27/2024    FALL RISK ASSESSMENT  10/27/2024    ADVANCE CARE PLANNING  10/08/2025    DTAP/TDAP/TD IMMUNIZATION (3 - Td or Tdap) 10/21/2033    DEXA  12/09/2037    DEPRESSION ACTION PLAN  Completed    INFLUENZA VACCINE  Completed    Pneumococcal Vaccine: 65+ Years  Completed    ZOSTER IMMUNIZATION  Completed    COVID-19 Vaccine  Completed    IPV IMMUNIZATION  Aged Out    HPV IMMUNIZATION  Aged Out    MENINGITIS IMMUNIZATION  Aged Out   Pertinent mammograms are reviewed under the imaging tab.    Review of Systems   Constitutional:  Negative for chills and fever.   HENT:  Negative for congestion, ear pain, hearing loss and sore throat.    Eyes:  Negative for pain and visual disturbance.   Respiratory:  Negative for cough and shortness of breath.    Cardiovascular:  Negative for chest pain, palpitations and peripheral edema.   Gastrointestinal:  Negative for abdominal pain, constipation, diarrhea, heartburn, hematochezia and nausea.   Breasts:   "Negative for tenderness, breast mass and discharge.   Genitourinary:  Negative for dysuria, frequency, genital sores, hematuria, pelvic pain, urgency, vaginal bleeding and vaginal discharge.   Musculoskeletal:  Positive for arthralgias. Negative for joint swelling and myalgias.   Skin:  Negative for rash.   Neurological:  Negative for dizziness, weakness, headaches and paresthesias.   Psychiatric/Behavioral:  Positive for mood changes. The patient is not nervous/anxious.        OBJECTIVE:   /71 (BP Location: Left arm, Patient Position: Sitting, Cuff Size: Adult Regular)   Pulse 63   Temp 98  F (36.7  C) (Tympanic)   Resp 16   Ht 1.689 m (5' 6.5\")   Wt 74.4 kg (164 lb)   LMP  (LMP Unknown)   SpO2 98%   Breastfeeding No   BMI 26.07 kg/m   Estimated body mass index is 26.07 kg/m  as calculated from the following:    Height as of this encounter: 1.689 m (5' 6.5\").    Weight as of this encounter: 74.4 kg (164 lb).  Physical Exam  General: well appearing female, alert and oriented x3  EYES: Eyelids, conjunctiva, and sclera were normal. Pupils were normal.   HEAD, EARS, NOSE, MOUTH, AND THROAT: no cervical LAD, no thyromegaly or nodules appreciated. TMs are visualized and normal, oropharynx is clear.  RESPIRATORY: respirations non labored, CTA bl, no wheezes, rales, no forced expiratory wheezing.  CARDIOVASCULAR: Heart rate and rhythm were normal. No murmurs, rubs,gallops. There was no peripheral edema. No carotid bruits.  GASTROINTESTINAL: Positive bowel sounds, abdomen is soft, non tender, non distended.     MUSCULOSKELETAL: Muscle mass was normal for age. No joint synovitis or deformity.  LYMPHATIC: There were no enlarged nodes palpable.  SKIN/HAIR/NAILS: Skin color was normal.  No rashes.  NEUROLOGIC: The patient was alert and oriented.  Speech was normal.  There is no facial asymmetry.   PSYCHIATRIC:  Mood and affect were normal.   Breast exam: No axilla lymphadenopathy, breast masses or skin changes " "appreciated.      ASSESSMENT / PLAN:   Meghan was seen today for wellness visit and recheck medication.    Diagnoses and all orders for this visit:    Encounter for Medicare annual wellness exam  She has had labs done in September which were reviewed, today we will check her cholesterol and liver functions.  She is up-to-date on all of her vaccinations.  Bone density test will be due next year.    Primary hypertension  Well-controlled on metoprolol,    Major Depression, Single Episode In Remission  She has been on Paxil in the past but was switched to sertraline by psychiatry, it does cause increase in diarrhea.  Recently she stopped sertraline 3 weeks ago and diarrhea resolved.  Mood has been good.  She will follow-up with psychiatry in November.  Discussed to come off his alternative plan of a new medication if her depression gets worse over the winter.  She will continue with his exercising.    Hyperlipidemia LDL goal <100  She is on Crestor.  -     Lipid panel reflex to direct LDL Fasting  -     Hepatic panel (Albumin, ALT, AST, Bili, Alk Phos, TP)    Vitamin D deficiency  -     Vitamin D Deficiency    S/P TAVR (transcatheter aortic valve replacement)  No murmurs on exam today.  Echocardiogram in June 2023 was done at [facility] where she sees her cardiologist and showed mild periprostatic regurgitation, ejection fraction of 60%.    Cerebrovascular accident (CVA), unspecified mechanism (H)  Due to severe aortic stenosis.  She was remembering 3 words today, overall is doing very well.    Other orders  -     PRIMARY CARE FOLLOW-UP SCHEDULING; Future        BMI:   Estimated body mass index is 26.07 kg/m  as calculated from the following:    Height as of this encounter: 1.689 m (5' 6.5\").    Weight as of this encounter: 74.4 kg (164 lb).         She reports that she has never smoked. She has never used smokeless tobacco.      Appropriate preventive services were discussed with this patient, including applicable " screening as appropriate for fall prevention, nutrition, physical activity, Tobacco-use cessation, weight loss and cognition.  Checklist reviewing preventive services available has been given to the patient.    Reviewed patients plan of care and provided an AVS. The Basic Care Plan (routine screening as documented in Health Maintenance) for Ellen meets the Care Plan requirement. This Care Plan has been established and reviewed with the Patient.        Brenda Godoy MD  [facility]    Identified Health Risks:    Answers submitted by the patient for this visit:  Patient Health Questionnaire (Submitted on 10/27/2023)  If you checked off any problems, how difficult have these problems made it for you to do your work, take care of things at home, or get along with other people?: Not difficult at all  PHQ9 TOTAL SCORE: 1

## 2023-10-27 NOTE — PATIENT INSTRUCTIONS
Continue exercising    2. See eye doctor for yearly     3. Get RSV    4. Take baby aspirin with food to prevent stomach irritation.     5. Follow up with psychiatry to have a plan for next medication if your depression gets worse over winter.       Patient Education   Personalized Prevention Plan  You are due for the preventive services outlined below.  Your care team is available to assist you in scheduling these services.  If you have already completed any of these items, please share that information with your care team to update in your medical record.  Health Maintenance Due   Topic Date Due    RSV VACCINE 60+ (1 - 1-dose 60+ series) Never done    ANNUAL REVIEW OF HM ORDERS  10/11/2022     Bladder Training: Care Instructions  Your Care Instructions     Bladder training is used to treat urge incontinence and stress incontinence. Urge incontinence means that the need to urinate comes on so fast that you can't get to a toilet in time. Stress incontinence means that you leak urine because of pressure on your bladder. For example, it may happen when you laugh, cough, or lift something heavy.  Bladder training can increase how long you can wait before you have to urinate. It can also help your bladder hold more urine. And it can give you better control over the urge to urinate.  It is important to remember that bladder training takes a few weeks to a few months to make a difference. You may not see results right away, but don't give up.  Follow-up care is a key part of your treatment and safety. Be sure to make and go to all appointments, and call your doctor if you are having problems. It's also a good idea to know your test results and keep a list of the medicines you take.  How can you care for yourself at home?  Work with your doctor to come up with a bladder training program that is right for you. You may use one or more of the following methods.  Delayed urination  In the beginning, try to keep from urinating  "for 5 minutes after you first feel the need to go.  While you wait, take deep, slow breaths to relax. Kegel exercises can also help you delay the need to go to the bathroom.  After some practice, when you can easily wait 5 minutes to urinate, try to wait 10 minutes before you urinate.  Slowly increase the waiting period until you are able to control when you have to urinate.  Scheduled urination  Empty your bladder when you first wake up in the morning.  Schedule times throughout the day when you will urinate.  Start by going to the bathroom every hour, even if you don't need to go.  Slowly increase the time between trips to the bathroom.  When you have found a schedule that works well for you, keep doing it.  If you wake up during the night and have to urinate, do it. Apply your schedule to waking hours only.  Kegel exercises  These tighten and strengthen pelvic muscles, which can help you control the flow of urine. (If doing these exercises causes pain, stop doing them and talk with your doctor.) To do Kegel exercises:  Squeeze your muscles as if you were trying not to pass gas. Or squeeze your muscles as if you were stopping the flow of urine. Your belly, legs, and buttocks shouldn't move.  Hold the squeeze for 3 seconds, then relax for 5 to 10 seconds.  Start with 3 seconds, then add 1 second each week until you are able to squeeze for 10 seconds.  Repeat the exercise 10 times a session. Do 3 to 8 sessions a day.  When should you call for help?  Watch closely for changes in your health, and be sure to contact your doctor if:    Your incontinence is getting worse.     You do not get better as expected.   Where can you learn more?  Go to https://www.[employer].net/patiented  Enter V684 in the search box to learn more about \"Bladder Training: Care Instructions.\"  Current as of: March 1, 2023               Content Version: 13.7    1318-6003 [employer].   Care instructions adapted under license by your " healthcare professional. If you have questions about a medical condition or this instruction, always ask your healthcare professional. [employer] disclaims any warranty or liability for your use of this information.

## 2023-11-14 DIAGNOSIS — I10 PRIMARY HYPERTENSION: ICD-10-CM

## 2023-11-15 NOTE — TELEPHONE ENCOUNTER
Please confirm if pt taking Amlodipine medication, it is not on her med list and I do not remember if we stopped it or it just fell off the list.

## 2023-11-15 NOTE — TELEPHONE ENCOUNTER
Attempted to call patient and inquire about amlodipine. Patient's home phone number on chart is no longer in service. Left a voicemail on alternate mobile number ([phone]) for her to call the clinic back. Will re attempt phone call at another time.

## 2023-11-16 NOTE — TELEPHONE ENCOUNTER
Attempted to return patient's call. No answer. Left message to call clinic.    When patient calls back please confirm if she is still taking amlodipine.

## 2023-11-17 RX ORDER — AMLODIPINE BESYLATE 2.5 MG/1
2.5 TABLET ORAL DAILY
Qty: 90 TABLET | Refills: 3 | Status: SHIPPED | OUTPATIENT
Start: 2023-11-17

## 2023-11-17 NOTE — TELEPHONE ENCOUNTER
Spoke with patient who confirms she is still taking amlodipine. States she does not remember discussing stopping it but does recall seeing it was stopped on her last AVS and was not sure why.

## 2023-11-20 ENCOUNTER — OFFICE VISIT (OUTPATIENT)
Dept: INTERNAL MEDICINE | Facility: CLINIC | Age: 83
End: 2023-11-20
Payer: MEDICARE

## 2023-11-20 ENCOUNTER — ANCILLARY PROCEDURE (OUTPATIENT)
Dept: GENERAL RADIOLOGY | Facility: CLINIC | Age: 83
End: 2023-11-20
Attending: INTERNAL MEDICINE
Payer: MEDICARE

## 2023-11-20 ENCOUNTER — TELEPHONE (OUTPATIENT)
Dept: INTERNAL MEDICINE | Facility: CLINIC | Age: 83
End: 2023-11-20

## 2023-11-20 VITALS
WEIGHT: 165.3 LBS | HEIGHT: 68 IN | DIASTOLIC BLOOD PRESSURE: 68 MMHG | TEMPERATURE: 98.1 F | HEART RATE: 74 BPM | RESPIRATION RATE: 13 BRPM | OXYGEN SATURATION: 99 % | BODY MASS INDEX: 25.05 KG/M2 | SYSTOLIC BLOOD PRESSURE: 116 MMHG

## 2023-11-20 DIAGNOSIS — M25.512 ACUTE PAIN OF LEFT SHOULDER: Primary | ICD-10-CM

## 2023-11-20 DIAGNOSIS — I10 PRIMARY HYPERTENSION: ICD-10-CM

## 2023-11-20 DIAGNOSIS — F32.4 MAJOR DEPRESSIVE DISORDER IN PARTIAL REMISSION, UNSPECIFIED WHETHER RECURRENT (H): ICD-10-CM

## 2023-11-20 DIAGNOSIS — M25.512 ACUTE PAIN OF LEFT SHOULDER: ICD-10-CM

## 2023-11-20 PROCEDURE — 73030 X-RAY EXAM OF SHOULDER: CPT | Mod: TC | Performed by: RADIOLOGY

## 2023-11-20 PROCEDURE — 99214 OFFICE O/P EST MOD 30 MIN: CPT | Performed by: INTERNAL MEDICINE

## 2023-11-20 NOTE — TELEPHONE ENCOUNTER
Julee MARQUIS,    There is drug - drug interaction duloxetine increasing the side effects of metoprolol by blocking CYP2D6.The main concern is orthostatic hypotension and dizziness on the first days of the medication start, and might stabilize through time. However, with her age it might be risky. I am not sure how her blood pressure and pulse has been. Does she need to be duloxetine? The other option is to monitor the side effects of metoprolol or to be safe possibly we can lower metoprolol dose. I am not sure if it is worthy all of that to take duloxetine.      Thanks,

## 2023-11-20 NOTE — TELEPHONE ENCOUNTER
Please let patient know:    1.  X-ray of the shoulder looks good, no significant arthritis, she should proceed with physical therapy to help treat rotator cuff tendinitis or possibly mild small tear.    2.  Our pharmacist did mention that duloxetine can interact with metoprolol and enhance its effect and cause dizziness or lightheadedness.  Based on this information, if psychiatrist and says that she takes duloxetine and not on any other antidepressant, I would not go higher than 30 mg of duloxetine a day and if she develops dizziness, stop it altogether.  Otherwise asks psychiatrist for a different antidepressant.

## 2023-11-20 NOTE — TELEPHONE ENCOUNTER
Brett Vargas,    Patient psychiatrist wants her to start on 30 mg of duloxetine and then increase it to 60 mg.  She is on metoprolol and her local pharmacist mention interaction between the 2.  I am not aware about interaction with SNRIs and beta-blockers.  Please let me know if you think she should be fine and what the interaction is.

## 2023-11-20 NOTE — PATIENT INSTRUCTIONS
Suspect left shoulder is a rotator cuff inflammation/partial tear. Will check XR to check on degree of arthritis in the shoulder. Start PT. If not better, see [facility]. For pain can take Tylenol 650 mg 2-3 times a day. Can also try Voltaren gel topically.

## 2023-11-20 NOTE — PROGRESS NOTES
"  Assessment & Plan     Acute pain of left shoulder  Suspect rotator cuff tendinitis versus mild tear, will start with physical therapy, will grab x-ray today to take a look at degree of arthritis, if she has severe arthritis may need to go to Ortho for steroid injection.  If physical therapy is not helping, then she should see Ortho as well.  - Physical Therapy Referral; Future  - XR Shoulder Left G/E 3 Views; Future  - Orthopedic  Referral; Future    Major depressive disorder in partial remission, unspecified whether recurrent (H24)  Previously Zoloft caused too much diarrhea and she is currently off of it.  Psychiatrist recommended starting on Cymbalta at 30 mg capsule and then increasing to 60 mg.  Currently patient denies worsening depressive symptoms or anxiety and wants to wait to start on this medication but also is concerned about possible interaction between Cymbalta and metoprolol.  We will check with our pharmacist and notify her.  Certainly she can start Cymbalta if symptoms of depression are getting worse.    Primary hypertension  Well-controlled on metoprolol and amlodipine.     BMI:   Estimated body mass index is 25.36 kg/m  as calculated from the following:    Height as of this encounter: 1.72 m (5' 7.7\").    Weight as of this encounter: 75 kg (165 lb 4.8 oz).     Brenda Godoy MD  [facility]    Subjective   Meghan is a 83 year old, presenting for the following health issues:  Left Arm Pain (Pt c/o left arm and shoulder pain for last month, states she is unable to fully raise left arm without pain. 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   She is currently here for acute visit due to pain in her left shoulder.    Pain started acutely several weeks ago, lays denies any injury, falls or lifting something heavy.  Currently due to pain she is unable to raise her arm above 90 degrees.  There is mild radiation over it into the neck area but no sharp shooting pains into her hand.  She denies any weakness in the , no numbness.  Pain does bother her at nighttime, she has not taking anything over-the-counter for pain control.    Blood pressure is well controlled she is on both amlodipine and metoprolol.    Since she stopped Zoloft diarrhea has improved.  She recently psychiatrist recommended Cymbalta for depression starting at 30 mg capsules.  Patient is anxious because her pharmacist mention interaction between Cymbalta and metoprolol and has not started it yet.  She is wondering if she could stay off her psychiatric medications for a while.    History of Present Illness       Reason for visit:  Rotator  Symptom onset:  3-4 weeks ago    She eats 2-3 servings of fruits and vegetables daily.She consumes 0 sweetened beverage(s) daily.She exercises with enough effort to increase her heart rate 30 to 60 minutes per day.  She exercises with enough effort to increase her heart rate 3 or less days per week. She is missing 1 dose(s) of medications per week.       Review of systems: As above      Objective    /68 (BP Location: Left arm, Patient Position: Sitting, Cuff Size: Adult Regular)   Pulse 74   Temp 98.1  F (36.7  C) (Oral)   Resp 13   Ht 1.72 m (5' 7.7\")   Wt 75 kg (165 lb 4.8 oz)   LMP  (LMP Unknown)   SpO2 99%   BMI 25.36 kg/m    Body mass index is 25.36 kg/m .  Physical Exam   General: well appearing female, alert and oriented x3  EYES: Eyelids, " conjunctiva, and sclera were normal. Pupils were normal.   RESPIRATORY: respirations non labored, CTA bl, no wheezes, rales, no forced expiratory wheezing.  CARDIOVASCULAR: Heart rate and rhythm were normal. No murmurs, rubs,gallops. There was no peripheral edema.   GASTROINTESTINAL: Positive bowel sounds, abdomen is soft, non tender, non distended.     MUSCULOSKELETAL: Muscle mass was normal for age. No joint synovitis or deformity.  She is unable to raise her left arm above 90 degrees due to pain, negative empty can test, good , no radiculopathy symptoms.  LYMPHATIC: There were no enlarged nodes palpable.  SKIN/HAIR/NAILS: Skin color was normal.  No rashes.  NEUROLOGIC: The patient was alert and oriented.  Speech was normal.  There is no facial asymmetry.   PSYCHIATRIC:  Mood and affect were normal.

## 2023-11-30 NOTE — PROGRESS NOTES
PHYSICAL THERAPY EVALUATION  Type of Visit: Evaluation    See electronic medical record for Abuse and Falls Screening details.    Subjective       Patient presents to therapy today with complaints of left shoulder pain. Pain level 4-5/10, range 2-3/10 to 8-9/10. Date of onset/duration of symptoms is October 2023. Onset was a gradual onset. Symptoms are constant- very minimal pain with rest. Patient denies a history of similar symptoms. Patient describes their previous level of function as not limited. Symptoms worsen with arm movement, sleeping, reaching, lifting. Symptoms improve with unsure at this time- maybe rest. Previous treatment includes none. Functional limitations: reaching, lifting, dressing, use of Ues, lifting, sleeping- wakes up 1-3 times per week. Pt describes the pain as dull/achy. Pt goals: put on my coat more easily          Objective   SHOULDER EVALUATION    INTEGUMENTARY (edema, incisions): WNL  POSTURE: Standing Posture: Rounded shoulders, Forward head, Thoracic kyphosis increased    ROM:   (Degrees) Left AROM Left PROM Right AROM  Right PROM   Shoulder Flexion 120 deg with pain  130 deg    Shoulder Extension       Shoulder Abduction 110 deg with pain  140 deg    Shoulder Adduction       Shoulder Internal Rotation Functional: to L1 with pain  Functional IR: to T12    Shoulder External Rotation Functional: To C7  Functional : to C7    Shoulder Horizontal Abduction       Shoulder Horizontal Adduction       Shoulder Flexion ER       Shoulder Flexion IR       Elbow Extension       Elbow Flexion       Pain:   End feel:     STRENGTH:   Pain: - none + mild ++ moderate +++ severe  Strength Scale: 0-5/5 Left Right   Shoulder Flexion 4- 4+   Shoulder Extension     Shoulder Abduction 4 4+   Shoulder Adduction 5 5   Shoulder Internal Rotation 5- 5   Shoulder External Rotation 3+ 4+   Shoulder Horizontal Abduction     Shoulder Horizontal Adduction     Elbow Flexion 4+ 4+   Elbow Extension 4+ 4+   Mid Trap      Lower Trap     Rhomboid     Serratus Anterior       FLEXIBILITY:   SPECIAL TESTS:    Left Right   Impingement     Neer's     Hawkin's-Leandro Positive Negative    Coracoid Impingement     Internal impingement     Labral     Anterior Slide     Driscoll's     Crank     Instability     Apprehension (anterior)     Relocation (anterior)     Anterior Load & Shift     Posterior Load & Shift     Posterior instability (with 90 degrees flex)     Multi-Directional Instability      Sulcus     Biceps      Speed's     Rotator Cuff Tear     Drop Arm Negative  Negative    Belly Press Negative  Negative    ERLS Negative  Negative      PALPATION:   + Tenderness At Location Left Right   Clavicle     Sternoclavicular     Acromioclavicular + -   Biceps - -   Triceps - -   Supraspinatus + -   Infraspinatus + -   Teres minor + -   Subscapularis - -   Deltoid     Levator     Rhomboids     Upper trap - -   Incisional     Bicipital groove       JOINT MOBILITY:   CERVICAL SCREEN: WNL    Assessment & Plan   CLINICAL IMPRESSIONS  Medical Diagnosis: Acute pain of left shoulder    Treatment Diagnosis: Acute pain of left shoulder, left shoulder impingement, muscle weakness   Impression/Assessment: Patient is a 83 year old female with left shoulder impingement complaints.  The following significant findings have been identified: Pain, Decreased ROM/flexibility, Decreased strength, Decreased activity tolerance, and Impaired posture. These impairments interfere with their ability to perform self care tasks, recreational activities, and household chores as compared to previous level of function.     Clinical Decision Making (Complexity):  Clinical Presentation: Stable/Uncomplicated  Clinical Presentation Rationale: based on medical and personal factors listed in PT evaluation  Clinical Decision Making (Complexity): Low complexity    PLAN OF CARE  Treatment Interventions:  Modalities: Cryotherapy, E-stim, Hot Pack  Interventions: Gait Training, Manual  Therapy, Neuromuscular Re-education, Therapeutic Activity, Therapeutic Exercise, Self-Care/Home Management    Long Term Goals     PT Goal 1  Goal Identifier: HEP  Goal Description: Pt will be independent in HEP to address impairments, reduce pain and improve function  Target Date: 01/12/24  PT Goal 2  Goal Identifier: Dressing  Goal Description: Pt will be able to don/doff shirts without pain to return to PLOF  Target Date: 02/28/24  PT Goal 3  Goal Identifier: SPADI  Goal Description: Pt will demonstrate significant improvement in her L shoulder pain and function as demonstrated by a >9 pts improvement in her SPADI score  Target Date: 02/28/24  PT Goal 4  Goal Identifier: Reaching and lifting  Goal Description: Pt will be able to reach overhead and lift 5# for daily household chores with <3/10 pain  Target Date: 02/28/24      Frequency of Treatment: 1x/week once able to return to PT due to clinic availability  Duration of Treatment: 12 weeks    Recommended Referrals to Other Professionals:  none  Education Assessment:   Learner/Method: Patient;Pictures/Video    Risks and benefits of evaluation/treatment have been explained.   Patient/Family/caregiver agrees with Plan of Care.     Evaluation Time:     PT Eval, Low Complexity Minutes (00456): 24       Signing Clinician: Kristin August, PT      [facility]                                                                                   OUTPATIENT PHYSICAL THERAPY      PLAN OF TREATMENT FOR OUTPATIENT REHABILITATION   Patient's Last Name, First Name, Ellen Santos YOB: 1940   Provider's Name   [facility]   Medical Record No.  [id number]     Onset Date: 10/01/23  Start of Care Date: 12/01/23     Medical Diagnosis:  Acute pain of left shoulder      PT Treatment Diagnosis:  Acute pain of left shoulder, left shoulder impingement, muscle weakness Plan of  Treatment  Frequency/Duration: 1x/week once able to return to PT due to clinic availability/ 12 weeks    Certification date from 12/01/23 to 02/28/24         See note for plan of treatment details and functional goals     Kristin August, PT                         I CERTIFY THE NEED FOR THESE SERVICES FURNISHED UNDER        THIS PLAN OF TREATMENT AND WHILE UNDER MY CARE     (Physician attestation of this document indicates review and certification of the therapy plan).              Referring Provider:  Brenda Godoy    Initial Assessment  See Epic Evaluation- Start of Care Date: 12/01/23

## 2023-12-01 ENCOUNTER — THERAPY VISIT (OUTPATIENT)
Dept: PHYSICAL THERAPY | Facility: REHABILITATION | Age: 83
End: 2023-12-01
Attending: INTERNAL MEDICINE
Payer: MEDICARE

## 2023-12-01 DIAGNOSIS — M25.512 ACUTE PAIN OF LEFT SHOULDER: ICD-10-CM

## 2023-12-01 DIAGNOSIS — R53.1 STRENGTH LOSS OF: ICD-10-CM

## 2023-12-01 DIAGNOSIS — M75.42 IMPINGEMENT SYNDROME OF SHOULDER REGION, LEFT: Primary | ICD-10-CM

## 2023-12-01 PROBLEM — M62.81 MUSCLE WEAKNESS (GENERALIZED): Status: ACTIVE | Noted: 2023-12-01

## 2023-12-01 PROCEDURE — 97161 PT EVAL LOW COMPLEX 20 MIN: CPT | Mod: GP | Performed by: PHYSICAL THERAPIST

## 2023-12-01 PROCEDURE — 97110 THERAPEUTIC EXERCISES: CPT | Mod: GP | Performed by: PHYSICAL THERAPIST

## 2024-01-09 ENCOUNTER — TRANSFERRED RECORDS (OUTPATIENT)
Dept: HEALTH INFORMATION MANAGEMENT | Facility: CLINIC | Age: 84
End: 2024-01-09
Payer: MEDICARE

## 2024-02-05 ENCOUNTER — TRANSFERRED RECORDS (OUTPATIENT)
Dept: HEALTH INFORMATION MANAGEMENT | Facility: CLINIC | Age: 84
End: 2024-02-05
Payer: MEDICARE

## 2024-03-15 PROBLEM — M75.42 IMPINGEMENT SYNDROME OF SHOULDER REGION, LEFT: Status: RESOLVED | Noted: 2023-12-01 | Resolved: 2024-03-15

## 2024-03-15 PROBLEM — M25.512 ACUTE PAIN OF LEFT SHOULDER: Status: RESOLVED | Noted: 2023-12-01 | Resolved: 2024-03-15

## 2024-03-15 PROBLEM — R53.1 STRENGTH LOSS OF: Status: RESOLVED | Noted: 2023-12-01 | Resolved: 2024-03-15

## 2024-03-15 NOTE — PROGRESS NOTES
DISCHARGE  Reason for Discharge: Patient chooses to discontinue therapy.    Equipment Issued: none    Discharge Plan: Patient to continue home program.    Referring Provider:  Brenda Godoy     12/01/23 0500   Appointment Info   Signing clinician's name / credentials Kristin August, PT, DPT, VIELKA   Total/Authorized Visits 8   Visits Used 1   Medical Diagnosis Acute pain of left shoulder   PT Tx Diagnosis Acute pain of left shoulder, left shoulder impingement, muscle weakness   Quick Adds Certification   Progress Note/Certification   Start of Care Date 12/01/23   Onset of illness/injury or Date of Surgery 10/01/23   Therapy Frequency 1x/week once able to return to PT due to clinic availability   Predicted Duration 12 weeks   Certification date from 12/01/23   Certification date to 02/28/24   Progress Note Due Date 02/28/24   Progress Note Completed Date 12/01/23   GOALS   PT Goals 2;3;4   PT Goal 1   Goal Identifier HEP   Goal Description Pt will be independent in HEP to address impairments, reduce pain and improve function   Target Date 01/12/24   PT Goal 2   Goal Identifier Dressing   Goal Description Pt will be able to don/doff shirts without pain to return to PLOF   Target Date 02/28/24   PT Goal 3   Goal Identifier SPADI   Goal Description Pt will demonstrate significant improvement in her L shoulder pain and function as demonstrated by a >9 pts improvement in her SPADI score   Target Date 02/28/24   PT Goal 4   Goal Identifier Reaching and lifting   Goal Description Pt will be able to reach overhead and lift 5# for daily household chores with <3/10 pain   Target Date 02/28/24   Treatment Interventions (PT)   Interventions Therapeutic Procedure/Exercise   Therapeutic Procedure/Exercise   Therapeutic Procedures: strength, endurance, ROM, flexibility minutes (45998) 15   Skilled Intervention Initiation of HEP for scapular and RC strengthening and shoulder AROM to reduce pain and improve function    Patient Response/Progress pain with shoulder ER with L1 band- switched to S/L with improved tolerance   PTRx Ther Proc 1 Shoulder External Rotation Sidelying   PTRx Ther Proc 1 - Details 2 x 5 reps on L attempted shoulder ER with L1 band but did not tolerate   PTRx Ther Proc 2 Shoulder Theraband Rows   PTRx Ther Proc 2 - Details x 10 reps with L1 band   PTRx Ther Proc 3 Wand Shoulder External Rotation in Neutral   PTRx Ther Proc 3 - Details 5 x 10 sec hold on L with shoulder in    Eval/Assessments   PT Eval, Low Complexity Minutes (29961) 24   Education   Learner/Method Patient;Pictures/Video   Plan   Home program PTRx   Plan for next session Review HEP and progress with shoulder ext with band, RC strengthening   Total Session Time   Timed Code Treatment Minutes 15   Total Treatment Time (sum of timed and untimed services) 39

## 2024-04-23 ENCOUNTER — TRANSFERRED RECORDS (OUTPATIENT)
Dept: HEALTH INFORMATION MANAGEMENT | Facility: CLINIC | Age: 84
End: 2024-04-23
Payer: MEDICARE

## 2024-05-06 DIAGNOSIS — I10 ESSENTIAL HYPERTENSION: ICD-10-CM

## 2024-05-06 RX ORDER — METOPROLOL SUCCINATE 100 MG/1
100 TABLET, EXTENDED RELEASE ORAL DAILY
Qty: 90 TABLET | Refills: 3 | Status: SHIPPED | OUTPATIENT
Start: 2024-05-06 | End: 2024-09-21

## 2024-05-06 NOTE — TELEPHONE ENCOUNTER
Medication Question or Refill    Contacts         Type Contact Phone/Fax    05/06/2024 10:20 AM CDT Fax (Incoming) [facility] - [place], [place] - [address] (Pharmacy) [phone]            What medication are you calling about (include dose and sig)?:    Disp Refills Start End TAM   metoprolol succinate (TOPROL-XL) 50 MG 24 hr tablet (Discontinued) 90 tablet 2 3/31/2021 1/9/2022 No   Sig - Route: [METOPROLOL SUCCINATE (TOPROL-XL) 50 MG 24 HR TABLET] TAKE 1 TABLET (50 MG TOTAL) BY MOUTH DAILY. - Oral   Class: Normal   Order: [id number]       Preferred Pharmacy:   [facility] - [place], [place] - [address]  [address]  [place] [address]  Phone: [phone] Fax: [phone]          Controlled Substance Agreement on file:   CSA -- Patient Level:    CSA: None found at the patient level.       Who prescribed the medication?: Dr Jose Eduardo Keen    Do you need a refill? Yes

## 2024-05-16 ENCOUNTER — TRANSFERRED RECORDS (OUTPATIENT)
Dept: HEALTH INFORMATION MANAGEMENT | Facility: CLINIC | Age: 84
End: 2024-05-16
Payer: MEDICARE

## 2024-06-03 ENCOUNTER — OFFICE VISIT (OUTPATIENT)
Dept: INTERNAL MEDICINE | Facility: CLINIC | Age: 84
End: 2024-06-03
Payer: MEDICARE

## 2024-06-03 VITALS
HEIGHT: 67 IN | DIASTOLIC BLOOD PRESSURE: 64 MMHG | HEART RATE: 78 BPM | BODY MASS INDEX: 25.58 KG/M2 | WEIGHT: 163 LBS | RESPIRATION RATE: 16 BRPM | SYSTOLIC BLOOD PRESSURE: 112 MMHG | TEMPERATURE: 97.8 F | OXYGEN SATURATION: 97 %

## 2024-06-03 DIAGNOSIS — F32.5 MAJOR DEPRESSIVE DISORDER, SINGLE EPISODE IN FULL REMISSION (H): ICD-10-CM

## 2024-06-03 DIAGNOSIS — R19.7 DIARRHEA, UNSPECIFIED TYPE: ICD-10-CM

## 2024-06-03 DIAGNOSIS — I63.9 CEREBROVASCULAR ACCIDENT (CVA), UNSPECIFIED MECHANISM (H): ICD-10-CM

## 2024-06-03 DIAGNOSIS — Z95.2 S/P TAVR (TRANSCATHETER AORTIC VALVE REPLACEMENT): Primary | ICD-10-CM

## 2024-06-03 LAB
BASOPHILS # BLD AUTO: 0 10E3/UL (ref 0–0.2)
BASOPHILS NFR BLD AUTO: 1 %
EOSINOPHIL # BLD AUTO: 0.2 10E3/UL (ref 0–0.7)
EOSINOPHIL NFR BLD AUTO: 2 %
ERYTHROCYTE [DISTWIDTH] IN BLOOD BY AUTOMATED COUNT: 13.8 % (ref 10–15)
HCT VFR BLD AUTO: 36.6 % (ref 35–47)
HGB BLD-MCNC: 11.6 G/DL (ref 11.7–15.7)
IMM GRANULOCYTES # BLD: 0 10E3/UL
IMM GRANULOCYTES NFR BLD: 0 %
LYMPHOCYTES # BLD AUTO: 1.8 10E3/UL (ref 0.8–5.3)
LYMPHOCYTES NFR BLD AUTO: 25 %
MCH RBC QN AUTO: 29 PG (ref 26.5–33)
MCHC RBC AUTO-ENTMCNC: 31.7 G/DL (ref 31.5–36.5)
MCV RBC AUTO: 92 FL (ref 78–100)
MONOCYTES # BLD AUTO: 0.6 10E3/UL (ref 0–1.3)
MONOCYTES NFR BLD AUTO: 9 %
NEUTROPHILS # BLD AUTO: 4.6 10E3/UL (ref 1.6–8.3)
NEUTROPHILS NFR BLD AUTO: 63 %
PLATELET # BLD AUTO: 213 10E3/UL (ref 150–450)
RBC # BLD AUTO: 4 10E6/UL (ref 3.8–5.2)
WBC # BLD AUTO: 7.2 10E3/UL (ref 4–11)

## 2024-06-03 PROCEDURE — 84443 ASSAY THYROID STIM HORMONE: CPT | Performed by: INTERNAL MEDICINE

## 2024-06-03 PROCEDURE — 91320 SARSCV2 VAC 30MCG TRS-SUC IM: CPT | Performed by: INTERNAL MEDICINE

## 2024-06-03 PROCEDURE — 99000 SPECIMEN HANDLING OFFICE-LAB: CPT | Performed by: INTERNAL MEDICINE

## 2024-06-03 PROCEDURE — 80048 BASIC METABOLIC PNL TOTAL CA: CPT | Performed by: INTERNAL MEDICINE

## 2024-06-03 PROCEDURE — 86364 TISS TRNSGLTMNASE EA IG CLAS: CPT | Performed by: INTERNAL MEDICINE

## 2024-06-03 PROCEDURE — 36415 COLL VENOUS BLD VENIPUNCTURE: CPT | Performed by: INTERNAL MEDICINE

## 2024-06-03 PROCEDURE — 86258 DGP ANTIBODY EACH IG CLASS: CPT | Performed by: INTERNAL MEDICINE

## 2024-06-03 PROCEDURE — 86231 EMA EACH IG CLASS: CPT | Mod: 90 | Performed by: INTERNAL MEDICINE

## 2024-06-03 PROCEDURE — 90480 ADMN SARSCOV2 VAC 1/ONLY CMP: CPT | Performed by: INTERNAL MEDICINE

## 2024-06-03 PROCEDURE — 84439 ASSAY OF FREE THYROXINE: CPT | Performed by: INTERNAL MEDICINE

## 2024-06-03 PROCEDURE — 99214 OFFICE O/P EST MOD 30 MIN: CPT | Performed by: INTERNAL MEDICINE

## 2024-06-03 PROCEDURE — 85025 COMPLETE CBC W/AUTO DIFF WBC: CPT | Performed by: INTERNAL MEDICINE

## 2024-06-03 PROCEDURE — 82784 ASSAY IGA/IGD/IGG/IGM EACH: CPT | Performed by: INTERNAL MEDICINE

## 2024-06-03 ASSESSMENT — ANXIETY QUESTIONNAIRES
7. FEELING AFRAID AS IF SOMETHING AWFUL MIGHT HAPPEN: NOT AT ALL
GAD7 TOTAL SCORE: 3
IF YOU CHECKED OFF ANY PROBLEMS ON THIS QUESTIONNAIRE, HOW DIFFICULT HAVE THESE PROBLEMS MADE IT FOR YOU TO DO YOUR WORK, TAKE CARE OF THINGS AT HOME, OR GET ALONG WITH OTHER PEOPLE: NOT DIFFICULT AT ALL
GAD7 TOTAL SCORE: 3
3. WORRYING TOO MUCH ABOUT DIFFERENT THINGS: SEVERAL DAYS
7. FEELING AFRAID AS IF SOMETHING AWFUL MIGHT HAPPEN: NOT AT ALL
4. TROUBLE RELAXING: NOT AT ALL
5. BEING SO RESTLESS THAT IT IS HARD TO SIT STILL: NOT AT ALL
GAD7 TOTAL SCORE: 3
6. BECOMING EASILY ANNOYED OR IRRITABLE: NOT AT ALL
2. NOT BEING ABLE TO STOP OR CONTROL WORRYING: SEVERAL DAYS
1. FEELING NERVOUS, ANXIOUS, OR ON EDGE: SEVERAL DAYS
8. IF YOU CHECKED OFF ANY PROBLEMS, HOW DIFFICULT HAVE THESE MADE IT FOR YOU TO DO YOUR WORK, TAKE CARE OF THINGS AT HOME, OR GET ALONG WITH OTHER PEOPLE?: NOT DIFFICULT AT ALL

## 2024-06-03 ASSESSMENT — PATIENT HEALTH QUESTIONNAIRE - PHQ9
10. IF YOU CHECKED OFF ANY PROBLEMS, HOW DIFFICULT HAVE THESE PROBLEMS MADE IT FOR YOU TO DO YOUR WORK, TAKE CARE OF THINGS AT HOME, OR GET ALONG WITH OTHER PEOPLE: NOT DIFFICULT AT ALL
SUM OF ALL RESPONSES TO PHQ QUESTIONS 1-9: 0
SUM OF ALL RESPONSES TO PHQ QUESTIONS 1-9: 0

## 2024-06-03 ASSESSMENT — PAIN SCALES - GENERAL: PAINLEVEL: NO PAIN (0)

## 2024-06-03 NOTE — LETTER
June 12, 2024      Meghan RICE Matteo  [address]    [place] [address]        Dear Ms.Matteo,    We are writing to inform you of your test results.    Meghan,  Stool culture is negative for blood, or infection. Can try Immodium to help with diarrhea.    Dr BENITEZ Castro Orders   Enteric Bacteria and Virus Panel by ANIYA Stool   Result Value Ref Range    Campylobacter species Negative Negative    Salmonella species Negative Negative    Vibrio species Negative Negative    Vibrio cholerae Negative Negative    Yersinia enterocolitica Negative Negative    Enteropathogenic E. coli (EPEC) Negative Negative, NA    Shiga-like toxin-producing E. coli (STEC) Negative Negative    Shigella/Enteroinvasive E. coli (EIEC) Negative Negative    Cryptosporidium species Negative Negative    Giardia lamblia Negative Negative    Norovirus Gl/Gll Negative Negative    Rotavirus A Negative Negative    Plesiomonas shigelloides Negative Negative    Enteroaggregative E. coli (EAEC) Negative Negative    Enterotoxigenic E. coli (ETEC) Negative Negative    E. coli O157 NA Negative, NA    Cyclospora cayetanensis Negative Negative    Entamoeba histolytica Negative Negative    Adenovirus F40/41 Negative Negative    Astrovirus Negative Negative    Sapovirus Negative Negative    Narrative    Assay performed using the FDA-cleared FilmArray GI Panel from [employer]  A negative result should not rule out infection in patients with a probability for gastrointestinal infection. The assay does not test for all potential infectious agents of diarrheal disease.  Positive results do not distinguish between a viable or replicating organism and the presence of a nonviable organism or nucleic acid, nor do they exclude the possibility of coinfection by organisms not in the panel.  Results are intended to aid in the diagnosis of illness and are meant to be used in conjunction with other clinical findings.  This test has been verified and is performed  by the Infectious Diseases Diagnostic Laboratory at [facility]. This laboratory is certified under the Clinical Laboratory Improvement Amendments of 1988 (CLIA-88) as qualified to perform high complexity clinical laboratory testing.   C. difficile Toxin B PCR with reflex to C. difficile Antigen and Toxins A/B EIA   Result Value Ref Range    C Difficile Toxin B by PCR Negative Negative      Comment:      A negative result does not exclude actual disease due to C. difficile and may be due to improper collection, handling and storage of the specimen or the number of organisms in the specimen is below the detection limit of the assay.    Narrative    The [employer] Xpert C. difficile Assay, performed on the [employer], is a qualitative in vitro diagnostic test for rapid detection of toxin B gene sequences from unformed (liquid or soft) stool specimens collected from patients suspected of having Clostridioides difficile infection (CDI). The test utilizes automated real-time polymerase chain reaction (PCR) to detect toxin gene sequences associated with toxin producing C. difficile. The Xpert C. difficile Assay is intended as an aid in the diagnosis of CDI.   Occult blood stool   Result Value Ref Range    Occult Blood Negative Negative   IgA [LAB73]   Result Value Ref Range    Immunoglobulin A 102 84 - 499 mg/dL   Deamidated Giladin Peptide Roni IgA IgG [[id number]]   Result Value Ref Range    Deamidated Gliadin Antibody IgA 0.5 <7.0 U/mL      Comment:      Negative    Deamidated Gliadin Antibody IgG <0.6 <7.0 U/mL      Comment:      Negative   Tissue transglutaminase roni IgA and IgG [[id number]]   Result Value Ref Range    Tissue Transglutaminase Antibody IgA 0.2 <7.0 U/mL      Comment:      Negative- The tTG-IgA assay has limited utility for patients with decreased levels of IgA. Screening for celiac disease should include IgA testing to rule out selective IgA deficiency and to guide selection and  interpretation of serological testing. tTG-IgG testing may be positive in celiac disease patients with IgA deficiency.    Tissue Transglutaminase Antibody IgG 0.8 <7.0 U/mL      Comment:      Negative   Endomysial Antibody IgA by IFA [[id number]]   Result Value Ref Range    Endomysial Antibody IgA by IFA <1:10 <1:10      Comment:      INTERPRETIVE INFORMATION: Endomysial Antibody, IgA Titer    The endomysial antigen has been identified as the protein   cross-linking enzyme known as tissue transglutaminase.  Performed By: [facility]  [address] [place] [id number]  : Jose Carlos Aguilar MD, PhD  CLIA Number: [id number]   Basic metabolic panel  (Ca, Cl, CO2, Creat, Gluc, K, Na, BUN)   Result Value Ref Range    Sodium 139 135 - 145 mmol/L      Comment:      Reference intervals for this test were updated on 09/26/2023 to more accurately reflect our healthy population. There may be differences in the flagging of prior results with similar values performed with this method. Interpretation of those prior results can be made in the context of the updated reference intervals.     Potassium 4.6 3.4 - 5.3 mmol/L    Chloride 104 98 - 107 mmol/L    Carbon Dioxide (CO2) 24 22 - 29 mmol/L    Anion Gap 11 7 - 15 mmol/L    Urea Nitrogen 23.6 (H) 8.0 - 23.0 mg/dL    Creatinine 1.07 (H) 0.51 - 0.95 mg/dL    GFR Estimate 52 (L) >60 mL/min/1.73m2    Calcium 9.6 8.8 - 10.2 mg/dL    Glucose 84 70 - 99 mg/dL   TSH with free T4 reflex   Result Value Ref Range    TSH 5.04 (H) 0.30 - 4.20 uIU/mL   CBC with platelets and differential   Result Value Ref Range    WBC Count 7.2 4.0 - 11.0 10e3/uL    RBC Count 4.00 3.80 - 5.20 10e6/uL    Hemoglobin 11.6 (L) 11.7 - 15.7 g/dL    Hematocrit 36.6 35.0 - 47.0 %    MCV 92 78 - 100 fL    MCH 29.0 26.5 - 33.0 pg    MCHC 31.7 31.5 - 36.5 g/dL    RDW 13.8 10.0 - 15.0 %    Platelet Count 213 150 - 450 10e3/uL    % Neutrophils 63 %    % Lymphocytes 25 %    % Monocytes 9 %     % Eosinophils 2 %    % Basophils 1 %    % Immature Granulocytes 0 %    Absolute Neutrophils 4.6 1.6 - 8.3 10e3/uL    Absolute Lymphocytes 1.8 0.8 - 5.3 10e3/uL    Absolute Monocytes 0.6 0.0 - 1.3 10e3/uL    Absolute Eosinophils 0.2 0.0 - 0.7 10e3/uL    Absolute Basophils 0.0 0.0 - 0.2 10e3/uL    Absolute Immature Granulocytes 0.0 <=0.4 10e3/uL   T4 free   Result Value Ref Range    Free T4 0.84 (L) 0.90 - 1.70 ng/dL       If you have any questions or concerns, please call the clinic at the number listed above.       Sincerely,      Brenda Godoy MD

## 2024-06-03 NOTE — PATIENT INSTRUCTIONS
Schedule follow up with Cardiology: Dr Juliet Julio    2. For diarrhea: continue Psyllium. Try adding Immodium once day of  Pepetobithmall : bismuth subsalicylate (three 262 mg tablets three times day) . See GI specialist.    Have stool testing done to check for blood and infection.     Try Probiotics: Culturelle.    See Gi specialist for recommendations.    3. Covid booster today.

## 2024-06-03 NOTE — PROGRESS NOTES
Assessment & Plan     S/P TAVR (transcatheter aortic valve replacement)  She has severe aortic stenosis and sustained stroke after which she had TAVR procedure.  Currently denies any chest pains or palpitations.  She would like to transfer her care to [facility].  Last cycle was a month ago and showed mild periprosthetic regurgitation, ejection fraction is 55%  - Adult Cardiology Eval  Referral; Future    Cerebrovascular accident (CVA), unspecified mechanism (H)  Due to her aortic stenosis, she is on aspirin and cholesterol-lowering medication.  Currently she has minimal difficulty finding words.    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  Denies recurrent depressive symptoms, she sees psychiatry every 3 to 4 months.    Diarrhea, unspecified type  Most likely due to lymphocytic colitis, her last workup was at [facility] in 2017 when she had colonoscopy done and biopsy proven collagenous colitis.  Currently she only has up to 3 bowel movements a day, will repeat workup, will refer her to gastroenterology, she will continue fiber supplements and can try Imodium and/or Pepto-Bismol.  - Adult GI  Referral - Consult Only; Future  - Enteric Bacteria and Virus Panel by ANIYA Stool  - C. difficile Toxin B PCR with reflex to C. difficile Antigen and Toxins A/B EIA  - Occult blood stool  - IgA [LAB73]  - Deamidated Giladin Peptide Roni IgA IgG [[id number]]  - Tissue transglutaminase roni IgA and IgG [[id number]]  - Endomysial Antibody IgA by IFA [[id number]]  - CBC with platelets and differential  - Basic metabolic panel  (Ca, Cl, CO2, Creat, Gluc, K, Na, BUN)  - TSH with free T4 reflex    BMI  Estimated body mass index is 25.53 kg/m  as calculated from the following:    Height as of this encounter: 1.702 m  "(5' 7\").    Weight as of this encounter: 73.9 kg (163 lb).       Dara Christianson is a 83 year old, presenting for the following health issues:  Follow Up and Recheck Medication (Pt reports that she is here to follow from last appointment, and having diarrhea.)      6/3/2024     9:20 AM   Additional Questions   Roomed by ryan   Accompanied by alone         6/3/2024     9:20 AM   Patient Reported Additional Medications   Patient reports taking the following new medications none     History of Present Illness       Mental Health Follow-up:  Patient presents to follow-up on Depression & Anxiety.Patient's depression since last visit has been:  No change  The patient is having other symptoms associated with depression.  Patient's anxiety since last visit has been:  No change  The patient is having other symptoms associated with anxiety.  Any significant life events: other  Patient is not feeling anxious or having panic attacks.  Patient has no concerns about alcohol or drug use.    Vascular Disease:  She presents for follow up of vascular disease.     She never takes nitroglycerin. She takes daily aspirin.    Reason for visit:  Follow up    She eats 2-3 servings of fruits and vegetables daily.She consumes 0 sweetened beverage(s) daily.She exercises with enough effort to increase her heart rate 9 or less minutes per day.  She exercises with enough effort to increase her heart rate 3 or less days per week.   She is taking medications regularly.     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   She is currently here for follow-up.    From previous TAVR surgery " "due to aortic stenosis and stroke was at [facility], she would like to switch to cardiology follow-up visit and I am [facility] for review system and asking for referral.    She continues to have loose stools.  She is currently not on antidepressants.  She has 2-3 bowel movements a day, previous colonoscopy was in 2017 at [facility], no polyps were found, pathology report showed diffuse collagenous colitis, no dysplasia, she did have negative celiac screen and stool cultures at that time, currently not on any medications.  She does take psyllium fiber daily.    For depression and anxiety she is followed by psychiatry, previously Zoloft was changed to Cymbalta but currently she is off antidepressants for over 6 months.  She denies any worsening depression or anxiety.    Review of systems: As above      Objective    /64 (BP Location: Right arm, Patient Position: Sitting, Cuff Size: Adult Regular)   Pulse 78   Temp 97.8  F (36.6  C) (Tympanic)   Resp 16   Ht 1.702 m (5' 7\")   Wt 73.9 kg (163 lb)   LMP  (LMP Unknown)   SpO2 97%   Breastfeeding No   BMI 25.53 kg/m    Body mass index is 25.53 kg/m .  Physical Exam   General: well appearing female, alert and oriented x3  EYES: Eyelids, conjunctiva, and sclera were normal. Pupils were normal.   HEAD, EARS, NOSE, MOUTH, AND THROAT: no cervical LAD, no thyromegaly or nodules appreciated. TMs are visualized and normal, oropharynx is clear.  RESPIRATORY: respirations non labored, CTA bl, no wheezes, rales, no forced expiratory wheezing.  CARDIOVASCULAR: Heart rate and rhythm were normal. No murmurs, rubs,gallops. There was no peripheral edema. No carotid bruits.  GASTROINTESTINAL: Positive bowel sounds, abdomen is soft, non tender, non distended.     MUSCULOSKELETAL: Muscle mass was normal for age. No joint synovitis or deformity.  LYMPHATIC: There were no enlarged nodes palpable.  SKIN/HAIR/NAILS: Skin color was normal.  No rashes.  NEUROLOGIC: The patient was alert " and oriented.  Speech was normal.  There is no facial asymmetry.   PSYCHIATRIC:  Mood and affect were normal.            Signed Electronically by: Brenda Godoy MD

## 2024-06-03 NOTE — LETTER
June 6, 2024      Meghan N Matteo  [address]    [place] [address]        Dear ,    We are writing to inform you of your test results.    Meghan,  Kidney function is stable, potassium level is good.    Thyroid level is slightly low, but not by much, lets repeat labs again in 3 months.  Mild anemia noted.  Celiac screen is negative.  Please submit stool testing.    Dr BENITEZ Castro Orders   IgA [LAB73]   Result Value Ref Range    Immunoglobulin A 102 84 - 499 mg/dL   Deamidated Giladin Peptide Roni IgA IgG [[id number]]   Result Value Ref Range    Deamidated Gliadin Antibody IgA 0.5 <7.0 U/mL      Comment:      Negative    Deamidated Gliadin Antibody IgG <0.6 <7.0 U/mL      Comment:      Negative   Tissue transglutaminase roni IgA and IgG [[id number]]   Result Value Ref Range    Tissue Transglutaminase Antibody IgA 0.2 <7.0 U/mL      Comment:      Negative- The tTG-IgA assay has limited utility for patients with decreased levels of IgA. Screening for celiac disease should include IgA testing to rule out selective IgA deficiency and to guide selection and interpretation of serological testing. tTG-IgG testing may be positive in celiac disease patients with IgA deficiency.    Tissue Transglutaminase Antibody IgG 0.8 <7.0 U/mL      Comment:      Negative   Endomysial Antibody IgA by IFA [[id number]]   Result Value Ref Range    Endomysial Antibody IgA by IFA <1:10 <1:10      Comment:      INTERPRETIVE INFORMATION: Endomysial Antibody, IgA Titer    The endomysial antigen has been identified as the protein   cross-linking enzyme known as tissue transglutaminase.  Performed By: [facility]  [address] [place] [id number]  : Jose Carlos Aguilar MD, PhD  CLIA Number: [id number]   Basic metabolic panel  (Ca, Cl, CO2, Creat, Gluc, K, Na, BUN)   Result Value Ref Range    Sodium 139 135 - 145 mmol/L      Comment:      Reference intervals for this test were updated on 09/26/2023 to more  accurately reflect our healthy population. There may be differences in the flagging of prior results with similar values performed with this method. Interpretation of those prior results can be made in the context of the updated reference intervals.     Potassium 4.6 3.4 - 5.3 mmol/L    Chloride 104 98 - 107 mmol/L    Carbon Dioxide (CO2) 24 22 - 29 mmol/L    Anion Gap 11 7 - 15 mmol/L    Urea Nitrogen 23.6 (H) 8.0 - 23.0 mg/dL    Creatinine 1.07 (H) 0.51 - 0.95 mg/dL    GFR Estimate 52 (L) >60 mL/min/1.73m2    Calcium 9.6 8.8 - 10.2 mg/dL    Glucose 84 70 - 99 mg/dL   TSH with free T4 reflex   Result Value Ref Range    TSH 5.04 (H) 0.30 - 4.20 uIU/mL   CBC with platelets and differential   Result Value Ref Range    WBC Count 7.2 4.0 - 11.0 10e3/uL    RBC Count 4.00 3.80 - 5.20 10e6/uL    Hemoglobin 11.6 (L) 11.7 - 15.7 g/dL    Hematocrit 36.6 35.0 - 47.0 %    MCV 92 78 - 100 fL    MCH 29.0 26.5 - 33.0 pg    MCHC 31.7 31.5 - 36.5 g/dL    RDW 13.8 10.0 - 15.0 %    Platelet Count 213 150 - 450 10e3/uL    % Neutrophils 63 %    % Lymphocytes 25 %    % Monocytes 9 %    % Eosinophils 2 %    % Basophils 1 %    % Immature Granulocytes 0 %    Absolute Neutrophils 4.6 1.6 - 8.3 10e3/uL    Absolute Lymphocytes 1.8 0.8 - 5.3 10e3/uL    Absolute Monocytes 0.6 0.0 - 1.3 10e3/uL    Absolute Eosinophils 0.2 0.0 - 0.7 10e3/uL    Absolute Basophils 0.0 0.0 - 0.2 10e3/uL    Absolute Immature Granulocytes 0.0 <=0.4 10e3/uL   T4 free   Result Value Ref Range    Free T4 0.84 (L) 0.90 - 1.70 ng/dL       If you have any questions or concerns, please call the clinic at the number listed above.       Sincerely,      Brenda Godoy MD

## 2024-06-03 NOTE — NURSING NOTE
Pt tolerated injection (Covid 19 12+ Pfizer Comirnaty Vaccine). Site (Left deltoid) was cleansed with alcohol prior to injections. No pain, burning, swelling or redness at the site of the injection. Patient instructed to remain in clinic for 15 minutes afterwards, and to report any adverse reactions.

## 2024-06-04 LAB
ANION GAP SERPL CALCULATED.3IONS-SCNC: 11 MMOL/L (ref 7–15)
BUN SERPL-MCNC: 23.6 MG/DL (ref 8–23)
CALCIUM SERPL-MCNC: 9.6 MG/DL (ref 8.8–10.2)
CHLORIDE SERPL-SCNC: 104 MMOL/L (ref 98–107)
CREAT SERPL-MCNC: 1.07 MG/DL (ref 0.51–0.95)
DEPRECATED HCO3 PLAS-SCNC: 24 MMOL/L (ref 22–29)
EGFRCR SERPLBLD CKD-EPI 2021: 52 ML/MIN/1.73M2
GLIADIN IGA SER-ACNC: 0.5 U/ML
GLIADIN IGG SER-ACNC: <0.6 U/ML
GLUCOSE SERPL-MCNC: 84 MG/DL (ref 70–99)
IGA SERPL-MCNC: 102 MG/DL (ref 84–499)
POTASSIUM SERPL-SCNC: 4.6 MMOL/L (ref 3.4–5.3)
SODIUM SERPL-SCNC: 139 MMOL/L (ref 135–145)
T4 FREE SERPL-MCNC: 0.84 NG/DL (ref 0.9–1.7)
TSH SERPL DL<=0.005 MIU/L-ACNC: 5.04 UIU/ML (ref 0.3–4.2)
TTG IGA SER-ACNC: 0.2 U/ML
TTG IGG SER-ACNC: 0.8 U/ML

## 2024-06-05 LAB — ENDOMYSIUM IGA TITR SER IF: NORMAL {TITER}

## 2024-06-11 ENCOUNTER — APPOINTMENT (OUTPATIENT)
Dept: LAB | Facility: CLINIC | Age: 84
End: 2024-06-11
Payer: MEDICARE

## 2024-06-11 LAB — C DIFF TOX B STL QL: NEGATIVE

## 2024-06-11 PROCEDURE — 87507 IADNA-DNA/RNA PROBE TQ 12-25: CPT | Mod: GZ | Performed by: INTERNAL MEDICINE

## 2024-06-11 PROCEDURE — 87493 C DIFF AMPLIFIED PROBE: CPT | Performed by: INTERNAL MEDICINE

## 2024-06-12 ENCOUNTER — APPOINTMENT (OUTPATIENT)
Dept: LAB | Facility: CLINIC | Age: 84
End: 2024-06-12
Payer: MEDICARE

## 2024-06-12 LAB

## 2024-06-12 PROCEDURE — 82272 OCCULT BLD FECES 1-3 TESTS: CPT | Performed by: INTERNAL MEDICINE

## 2024-07-01 ENCOUNTER — TRANSFERRED RECORDS (OUTPATIENT)
Dept: HEALTH INFORMATION MANAGEMENT | Facility: CLINIC | Age: 84
End: 2024-07-01
Payer: MEDICARE

## 2024-08-07 ENCOUNTER — OFFICE VISIT (OUTPATIENT)
Dept: CARDIOLOGY | Facility: CLINIC | Age: 84
End: 2024-08-07
Payer: MEDICARE

## 2024-08-07 VITALS
DIASTOLIC BLOOD PRESSURE: 60 MMHG | HEIGHT: 67 IN | RESPIRATION RATE: 16 BRPM | SYSTOLIC BLOOD PRESSURE: 110 MMHG | BODY MASS INDEX: 25.58 KG/M2 | HEART RATE: 67 BPM | WEIGHT: 163 LBS

## 2024-08-07 DIAGNOSIS — Z95.2 S/P TAVR (TRANSCATHETER AORTIC VALVE REPLACEMENT): Primary | ICD-10-CM

## 2024-08-07 DIAGNOSIS — I63.9 CEREBROVASCULAR ACCIDENT (CVA), UNSPECIFIED MECHANISM (H): ICD-10-CM

## 2024-08-07 DIAGNOSIS — E78.00 HYPERCHOLESTEROLEMIA: Chronic | ICD-10-CM

## 2024-08-07 DIAGNOSIS — I10 PRIMARY HYPERTENSION: ICD-10-CM

## 2024-08-07 PROCEDURE — 99204 OFFICE O/P NEW MOD 45 MIN: CPT | Performed by: INTERNAL MEDICINE

## 2024-08-07 NOTE — LETTER
8/7/2024    Brenda Godoy MD  [address] [facility] [address]    RE: Ellen Felipe       Dear Colleague,     I had the pleasure of seeing Ellen Felipe in the [facility].      Thank you, Dr. Godoy, for asking the [facility] team to see Ms. Ellen Felipe to follow-up on TAVR valve.    Assessment/Recommendations   Assessment:    1.  Severe aortic valve stenosis, status post TAVR with 29 mm Medtronic CoreValve evolute Pro aortic bioprosthesis in June 2022.  She has done well over the intervening 2 years with no complaints of chest tightness or shortness of breath.  Did undergo an echocardiogram a year ago demonstrating normal bioprosthetic function with a mean gradient of 4 mmHg and mild periprosthetic regurgitation.  Did recommend repeating an echocardiogram for reassessment.  2.  Essential hypertension, controlled with combination of amlodipine and metoprolol therapy  3.  Hypercholesterolemia, currently on rosuvastatin.  I did not see evidence of a pre-TAVR angiogram to assess for concomitant coronary artery disease.  Her most recent lipid profile 9 months ago demonstrated an LDL of 51 which is excellent.  No change in therapy.  4.  Status post CVA, possibly related to severe aortic valve stenosis.  Her aphasia has essentially resolved.    Plan:  1.  Continue current medications  2.  Schedule echocardiogram to follow-up on TAVR valve  3.  Follow back in cardiology clinic in 1 year       History of Present Illness    Ms. Ellen Felipe is a 83 year old female with history of essential hypertension, hypercholesterolemia who presented to [facility] in May 2022 with evidence of CVA.  As part of her workup, she had an echocardiogram done demonstrating severe aortic valve stenosis which was a new finding.  No other clear source for stroke was identified and she was seen by cardiology who felt she would be a good candidate for TAVR.  She was  "subsequently discharged home and brought back the following month where she underwent TAVR with a 29 mm Medtronic CoreValve evolute Pro aortic bioprosthesis.  She has done well in the intervening 2 years and did undergo follow-up echocardiogram at [facility] in June 2023 demonstrating normal bioprosthetic valve function with mild paravalvular insufficiency.  Is now here to establish care at [facility].  Reports no significant symptoms of chest tightness or shortness of breath.  No significant residual aphasia.    ECG (personally reviewed): No ECG today    Cardiac Imaging Studies (personally reviewed): No new imaging     Physical Examination Review of Systems   /60 (BP Location: Right arm, Patient Position: Sitting, Cuff Size: Adult Regular)   Pulse 67   Resp 16   Ht 1.702 m (5' 7\")   Wt 73.9 kg (163 lb)   LMP  (LMP Unknown)   BMI 25.53 kg/m    Body mass index is 25.53 kg/m .  Wt Readings from Last 3 Encounters:   08/07/24 73.9 kg (163 lb)   06/03/24 73.9 kg (163 lb)   11/20/23 75 kg (165 lb 4.8 oz)     General Appearance:   Awake, Alert, No acute distress.   HEENT:  No scleral icterus; the mucous membranes were pink and moist.   Neck: No cervical bruits or jugular venous distention    Chest: The spine was straight. The chest was symmetric.   Lungs:   Respirations unlabored; the lungs are clear to auscultation. No wheezing   Cardiovascular:   Regular rate and rhythm.  S1, S2 normal.  No murmur or gallop   Abdomen:  No organomegaly, masses, bruits, or tenderness. Bowels sounds are present   Extremities: No peripheral edema bilaterally   Skin: No xanthelasma. Warm, Dry.   Musculoskeletal: No tenderness.   Neurologic: Mood and affect are appropriate.    Enc Vitals  BP: 110/60  Pulse: 67  Resp: 16  Weight: 73.9 kg (163 lb)  Height: 170.2 cm (5' 7\")                                         Medical History  Surgical History Family History Social History   Past Medical History:   Diagnosis Date     " Aortic valve stenosis     TAVR 2022     Arthritis      Cerebral artery occlusion with cerebral infarction (H)      Depression      Essential hypertension     Created by Conversion  Replacement Utility updated for latest IMO load     Heart murmur      Hyperlipidemia      IBS (irritable bowel syndrome)      Iron deficiency anemia due to chronic blood loss 10/26/2022     OCD (obsessive compulsive disorder) 04/10/2015     OCD (obsessive compulsive disorder)     Past Surgical History:   Procedure Laterality Date     AORTIC VALVE SURGERY  2022    TAVR     HC REMOVE TONSILS/ADENOIDS,<11 Y/O      Description: Tonsillectomy With Adenoidectomy;  Recorded: 2010;    Family History   Problem Relation Age of Onset     Heart Failure Mother          age 96     Heart Failure Father          age 85     Diabetes Type 2  Sister      Diabetes Type 2  Brother      Diabetes Type 2  Brother     Social History     Socioeconomic History     Marital status:      Spouse name: Not on file     Number of children: Not on file     Years of education: Not on file     Highest education level: Not on file   Occupational History     Not on file   Tobacco Use     Smoking status: Never     Smokeless tobacco: Never   Vaping Use     Vaping status: Never Used   Substance and Sexual Activity     Alcohol use: Yes     Alcohol/week: 0.8 standard drinks of alcohol     Comment: Alcoholic Drinks/day: 1 glass of red wine     Drug use: No     Sexual activity: Not on file   Other Topics Concern     Not on file   Social History Narrative    She is .  They do not have children.  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     Social Determinants of Health     Financial Resource Strain: Low Risk  (2023)    Financial Resource Strain      Within the past 12 months, have you or your family members you live with been unable to get utilities (heat, electricity) when it was really  needed?: No   Food Insecurity: Low Risk  (11/20/2023)    Food Insecurity      Within the past 12 months, did you worry that your food would run out before you got money to buy more?: No      Within the past 12 months, did the food you bought just not last and you didn t have money to get more?: No   Transportation Needs: Low Risk  (11/20/2023)    Transportation Needs      Within the past 12 months, has lack of transportation kept you from medical appointments, getting your medicines, non-medical meetings or appointments, work, or from getting things that you need?: No   Physical Activity: Not on file   Stress: Not on file   Social Connections: Unknown (12/27/2021)    Received from [employer] [facility] [employer] [facility], [employer] [facility]    Social Connections      Frequency of Communication with Friends and Family: Not on file   Interpersonal Safety: Low Risk  (6/3/2024)    Interpersonal Safety      Do you feel physically and emotionally safe where you currently live?: Yes      Within the past 12 months, have you been hit, slapped, kicked or otherwise physically hurt by someone?: No      Within the past 12 months, have you been humiliated or emotionally abused in other ways by your partner or ex-partner?: No   Housing Stability: Low Risk  (11/20/2023)    Housing Stability      Do you have housing? : Yes      Are you worried about losing your housing?: No          Medications  Allergies   Current Outpatient Medications   Medication Sig Dispense Refill     acetaminophen (TYLENOL) 325 MG tablet [ACETAMINOPHEN (TYLENOL) 325 MG TABLET] Take 2 tablets (650 mg total) by mouth every 6 (six) hours as needed.  0     amLODIPine (NORVASC) 2.5 MG tablet TAKE 1 TABLET (2.5 MG) BY MOUTH DAILY 90 tablet 3     amoxicillin (AMOXIL) 500 MG capsule Take 2,000 mg by mouth See Admin Instructions       aspirin (ASA) 81 MG EC tablet Take 1 tablet (81 mg) by mouth daily       metoprolol succinate ER (TOPROL  XL) 100 MG 24 hr tablet Take 1 tablet (100 mg) by mouth daily (Patient taking differently: Take 50 mg by mouth daily) 90 tablet 3     rosuvastatin (CRESTOR) 10 MG tablet TAKE 1 TABLET (10 MG) BY MOUTH DAILY 90 tablet 1      Allergies   Allergen Reactions     Iron Dextran Swelling     Hydrochlorothiazide Other (See Comments)     Hyponatremia, hypokalemia     Lactose Diarrhea     Sertraline Diarrhea     Bactrim [Sulfamethoxazole-Trimethoprim] Hives and Rash         Lab Results    Chemistry/lipid CBC Cardiac Enzymes/BNP/TSH/INR   Recent Labs   Lab Test 06/03/24  1030 10/27/23  1550   TRIG  --  104   LDL  --  51   BUN 23.6*  --      --    CO2 24  --     Recent Labs   Lab Test 06/03/24  1030   WBC 7.2   HGB 11.6*   HCT 36.6   MCV 92       Recent Labs   Lab Test 06/03/24  1030 12/04/19  1236 06/06/19  1208   TSH 5.04*   < >  --    INR  --   --  1.09    < > = values in this interval not displayed.        A total of 35 minutes was spent reviewing patient's medical records, obtaining history and performing examination, as well as discussing diagnoses/ recommendations with patient and answering all questions.                        Thank you for allowing me to participate in the care of your patient.      Sincerely,     Juliet Julio MD     [facility]  cc:   Brenda Godoy MD  [address] [place] [address]

## 2024-08-07 NOTE — PATIENT INSTRUCTIONS
Continue current medications  Set up echo in the next 1-2 months to look at TAVR valve.  Follow up in 1 year

## 2024-08-07 NOTE — PROGRESS NOTES
Thank you, Dr. Godoy, for asking the [facility] team to see Ms. Ellen Felipe to follow-up on TAVR valve.    Assessment/Recommendations   Assessment:    1.  Severe aortic valve stenosis, status post TAVR with 29 mm Medtronic CoreValve evolute Pro aortic bioprosthesis in June 2022.  She has done well over the intervening 2 years with no complaints of chest tightness or shortness of breath.  Did undergo an echocardiogram a year ago demonstrating normal bioprosthetic function with a mean gradient of 4 mmHg and mild periprosthetic regurgitation.  Did recommend repeating an echocardiogram for reassessment.  2.  Essential hypertension, controlled with combination of amlodipine and metoprolol therapy  3.  Hypercholesterolemia, currently on rosuvastatin.  I did not see evidence of a pre-TAVR angiogram to assess for concomitant coronary artery disease.  Her most recent lipid profile 9 months ago demonstrated an LDL of 51 which is excellent.  No change in therapy.  4.  Status post CVA, possibly related to severe aortic valve stenosis.  Her aphasia has essentially resolved.    Plan:  1.  Continue current medications  2.  Schedule echocardiogram to follow-up on TAVR valve  3.  Follow back in cardiology clinic in 1 year       History of Present Illness    Ms. Ellen Felipe is a 83 year old female with history of essential hypertension, hypercholesterolemia who presented to [facility] in May 2022 with evidence of CVA.  As part of her workup, she had an echocardiogram done demonstrating severe aortic valve stenosis which was a new finding.  No other clear source for stroke was identified and she was seen by cardiology who felt she would be a good candidate for TAVR.  She was subsequently discharged home and brought back the following month where she underwent TAVR with a 29 mm Medtronic CoreValve evolute Pro aortic bioprosthesis.  She has done well in the intervening 2 years and did undergo  "follow-up echocardiogram at [facility] in June 2023 demonstrating normal bioprosthetic valve function with mild paravalvular insufficiency.  Is now here to establish care at [facility].  Reports no significant symptoms of chest tightness or shortness of breath.  No significant residual aphasia.    ECG (personally reviewed): No ECG today    Cardiac Imaging Studies (personally reviewed): No new imaging     Physical Examination Review of Systems   /60 (BP Location: Right arm, Patient Position: Sitting, Cuff Size: Adult Regular)   Pulse 67   Resp 16   Ht 1.702 m (5' 7\")   Wt 73.9 kg (163 lb)   LMP  (LMP Unknown)   BMI 25.53 kg/m    Body mass index is 25.53 kg/m .  Wt Readings from Last 3 Encounters:   08/07/24 73.9 kg (163 lb)   06/03/24 73.9 kg (163 lb)   11/20/23 75 kg (165 lb 4.8 oz)     General Appearance:   Awake, Alert, No acute distress.   HEENT:  No scleral icterus; the mucous membranes were pink and moist.   Neck: No cervical bruits or jugular venous distention    Chest: The spine was straight. The chest was symmetric.   Lungs:   Respirations unlabored; the lungs are clear to auscultation. No wheezing   Cardiovascular:   Regular rate and rhythm.  S1, S2 normal.  No murmur or gallop   Abdomen:  No organomegaly, masses, bruits, or tenderness. Bowels sounds are present   Extremities: No peripheral edema bilaterally   Skin: No xanthelasma. Warm, Dry.   Musculoskeletal: No tenderness.   Neurologic: Mood and affect are appropriate.    Enc Vitals  BP: 110/60  Pulse: 67  Resp: 16  Weight: 73.9 kg (163 lb)  Height: 170.2 cm (5' 7\")                                         Medical History  Surgical History Family History Social History   Past Medical History:   Diagnosis Date    Aortic valve stenosis     TAVR 6/2022    Arthritis     Cerebral artery occlusion with cerebral infarction (H)     Depression     Essential hypertension     Created by Conversion  Replacement Utility updated for latest IMO " load    Heart murmur     Hyperlipidemia     IBS (irritable bowel syndrome)     Iron deficiency anemia due to chronic blood loss 10/26/2022    OCD (obsessive compulsive disorder) 04/10/2015    OCD (obsessive compulsive disorder)     Past Surgical History:   Procedure Laterality Date    AORTIC VALVE SURGERY  2022    TAVR    HC REMOVE TONSILS/ADENOIDS,<13 Y/O      Description: Tonsillectomy With Adenoidectomy;  Recorded: 2010;    Family History   Problem Relation Age of Onset    Heart Failure Mother          age 96    Heart Failure Father          age 85    Diabetes Type 2  Sister     Diabetes Type 2  Brother     Diabetes Type 2  Brother     Social History     Socioeconomic History    Marital status:      Spouse name: Not on file    Number of children: Not on file    Years of education: Not on file    Highest education level: Not on file   Occupational History    Not on file   Tobacco Use    Smoking status: Never    Smokeless tobacco: Never   Vaping Use    Vaping status: Never Used   Substance and Sexual Activity    Alcohol use: Yes     Alcohol/week: 0.8 standard drinks of alcohol     Comment: Alcoholic Drinks/day: 1 glass of red wine    Drug use: No    Sexual activity: Not on file   Other Topics Concern    Not on file   Social History Narrative    She is .  They do not have children.  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     Social Determinants of Health     Financial Resource Strain: Low Risk  (2023)    Financial Resource Strain     Within the past 12 months, have you or your family members you live with been unable to get utilities (heat, electricity) when it was really needed?: No   Food Insecurity: Low Risk  (2023)    Food Insecurity     Within the past 12 months, did you worry that your food would run out before you got money to buy more?: No     Within the past 12 months, did the food you bought just not last  and you didn t have money to get more?: No   Transportation Needs: Low Risk  (11/20/2023)    Transportation Needs     Within the past 12 months, has lack of transportation kept you from medical appointments, getting your medicines, non-medical meetings or appointments, work, or from getting things that you need?: No   Physical Activity: Not on file   Stress: Not on file   Social Connections: Unknown (12/27/2021)    [facility], [facility]    Social Connections     Frequency of Communication with Friends and Family: Not on file   Interpersonal Safety: Low Risk  (6/3/2024)    Interpersonal Safety     Do you feel physically and emotionally safe where you currently live?: Yes     Within the past 12 months, have you been hit, slapped, kicked or otherwise physically hurt by someone?: No     Within the past 12 months, have you been humiliated or emotionally abused in other ways by your partner or ex-partner?: No   Housing Stability: Low Risk  (11/20/2023)    Housing Stability     Do you have housing? : Yes     Are you worried about losing your housing?: No          Medications  Allergies   Current Outpatient Medications   Medication Sig Dispense Refill    acetaminophen (TYLENOL) 325 MG tablet [ACETAMINOPHEN (TYLENOL) 325 MG TABLET] Take 2 tablets (650 mg total) by mouth every 6 (six) hours as needed.  0    amLODIPine (NORVASC) 2.5 MG tablet TAKE 1 TABLET (2.5 MG) BY MOUTH DAILY 90 tablet 3    amoxicillin (AMOXIL) 500 MG capsule Take 2,000 mg by mouth See Admin Instructions      aspirin (ASA) 81 MG EC tablet Take 1 tablet (81 mg) by mouth daily      metoprolol succinate ER (TOPROL XL) 100 MG 24 hr tablet Take 1 tablet (100 mg) by mouth daily (Patient taking differently: Take 50 mg by mouth daily) 90 tablet 3    rosuvastatin (CRESTOR) 10 MG tablet TAKE 1 TABLET (10 MG) BY MOUTH DAILY 90 tablet 1      Allergies   Allergen Reactions    Iron  Dextran Swelling    Hydrochlorothiazide Other (See Comments)     Hyponatremia, hypokalemia    Lactose Diarrhea    Sertraline Diarrhea    Bactrim [Sulfamethoxazole-Trimethoprim] Hives and Rash         Lab Results    Chemistry/lipid CBC Cardiac Enzymes/BNP/TSH/INR   Recent Labs   Lab Test 06/03/24  1030 10/27/23  1550   TRIG  --  104   LDL  --  51   BUN 23.6*  --      --    CO2 24  --     Recent Labs   Lab Test 06/03/24  1030   WBC 7.2   HGB 11.6*   HCT 36.6   MCV 92       Recent Labs   Lab Test 06/03/24  1030 12/04/19  1236 06/06/19  1208   TSH 5.04*   < >  --    INR  --   --  1.09    < > = values in this interval not displayed.        A total of 35 minutes was spent reviewing patient's medical records, obtaining history and performing examination, as well as discussing diagnoses/ recommendations with patient and answering all questions.

## 2024-08-08 ENCOUNTER — TRANSFERRED RECORDS (OUTPATIENT)
Dept: HEALTH INFORMATION MANAGEMENT | Facility: CLINIC | Age: 84
End: 2024-08-08
Payer: MEDICARE

## 2024-09-10 ENCOUNTER — TRANSFERRED RECORDS (OUTPATIENT)
Dept: HEALTH INFORMATION MANAGEMENT | Facility: CLINIC | Age: 84
End: 2024-09-10
Payer: MEDICARE

## 2024-09-18 ENCOUNTER — PATIENT OUTREACH (OUTPATIENT)
Dept: CARE COORDINATION | Facility: CLINIC | Age: 84
End: 2024-09-18
Payer: MEDICARE

## 2024-09-18 NOTE — LETTER
[facility]       To:  [facility]            Please give to facility      From:   Mara Frank [facility]  Care Coordinator   [facility]   P: [phone]  [email or web address]        Patient Name:  Eleln Felipe     YOB: 1940   Admit date:   9-        Information Needed:  Please contact me when the patient will discharge (or if they will move to long term care)- include the discharge date, disposition, and main diagnosis       If the patient is discharged with home care services, please provide the name of the agency    Also- Please inform me if a care conference is being held.     Phone or Email with information                              Thank you

## 2024-09-18 NOTE — PROGRESS NOTES
"[facility] GERIATRICS    PRIMARY CARE PROVIDER AND CLINIC:  Brenda Godoy MD, [address] [facility] [address]  Chief Complaint   Patient presents with    Hospital F/U      [place] Medical Record Number:  [id number]  Place of Service where encounter took place:  [facility] ([facility]) [[id number]]    HPI:   Ellen Felipe  is a 84 year old  (1940), admitted to the above facility from  [facility]. Hospital stay 9/12/24 through 9/16/24. HPI information obtained from: facility chart records, facility staff, patient report and [facility] chart review. PMH: aortic stenosis s/p TAVR (June 2022), HTN, HLD, Hx of stroke. Presented to ED after falling and developed right-sided hip pain. X-ray of hip showed moderately displaced and angulated comminuted fracture of the proximal diaphysis/neck of the right femur with involvement of the lesser trochanter.  Underwent intramedullary nailing of right subtrochanteric femur on 9/12/2024.  Postop acute blood loss anemia requiring PRBC transfusion with resolution.  Discharged to this facility for medical management, rehabilitation, and nursing care. New on oxycodone.    Today:  Nursing staff reports no concerns.    Patient is seen today for a visit in her room she is sitting in a wheelchair watching television.  Feels \"pretty good\".  Worked with therapies this morning; reportedly walked in the gym.  Pain is rated 4/10 at rest; she notes \"trying to stay away\" from narcotics; receptive to schedule Tylenol. Appetite is \"too good\".  Last BM was \"yesterday.. might have one today\". Sleeping \"pretty good\".  Mood is \"not too bad\"; reported, \"I fought with depression over the years but now I'm okay\". Denies CP, palpitations, lightheadedness, dizziness, fatigue, SOB, fever, chills, nausea/vomiting, bladder concerns today.      CODE STATUS/ADVANCE DIRECTIVES DISCUSSION:  No Order  CPR/Full code   ALLERGIES:   Allergies   Allergen Reactions    Iron Dextran " Swelling    Hydrochlorothiazide Other (See Comments)     Hyponatremia, hypokalemia    Lactose Diarrhea    Sertraline Diarrhea    Bactrim [Sulfamethoxazole-Trimethoprim] Hives and Rash      PAST MEDICAL HISTORY:   Past Medical History:   Diagnosis Date    Aortic valve stenosis     TAVR 6/2022    Arthritis     Cerebral artery occlusion with cerebral infarction (H)     Depression     Essential hypertension     Created by Conversion  Replacement Utility updated for latest IMO load    Heart murmur     Hyperlipidemia     IBS (irritable bowel syndrome)     Iron deficiency anemia due to chronic blood loss 10/26/2022    OCD (obsessive compulsive disorder) 04/10/2015    OCD (obsessive compulsive disorder)       PAST SURGICAL HISTORY:   has a past surgical history that includes REMOVE TONSILS/ADENOIDS,<12 Y/O and Aortic Valve Surgery (06/2022).  FAMILY HISTORY: family history includes Diabetes Type 2  in her brother, brother, and sister; Heart Failure in her father and mother.  SOCIAL HISTORY:   reports that she has never smoked. She has never used smokeless tobacco. She reports current alcohol use of about 0.8 standard drinks of alcohol per week. She reports that she does not use drugs.  Patient's living condition: lives with spouse in senior living facility    Post Discharge Medication Reconciliation Status:   MED REC REQUIRED  Post Medication Reconciliation Status: discharge medications reconciled and changed, per note/orders       Current Outpatient Medications   Medication Sig Dispense Refill    acetaminophen (TYLENOL) 500 MG tablet Take 1,000 mg by mouth 3 times daily.      amLODIPine (NORVASC) 2.5 MG tablet TAKE 1 TABLET (2.5 MG) BY MOUTH DAILY 90 tablet 3    amoxicillin (AMOXIL) 500 MG capsule Take 2,000 mg by mouth See Admin Instructions      aspirin (ASA) 81 MG EC tablet Take 1 tablet (81 mg) by mouth daily (Patient taking differently: Take 81 mg by mouth 2 times daily. For 3 months per ortho)      calcium 600 MG  "tablet Take 2 tablets by mouth daily.      cholecalciferol (VITAMIN D3) 25 mcg (1000 units) capsule Take 1 capsule by mouth daily.      metoprolol succinate ER (TOPROL XL) 50 MG 24 hr tablet Take 50 mg by mouth daily.      Multiple Vitamin (MULTIVITAMIN PO) Take 1 chew tab by mouth daily.      oxyCODONE (OXY-IR) 5 MG capsule Take 5 mg by mouth every 6 hours as needed for severe pain.      rosuvastatin (CRESTOR) 10 MG tablet TAKE 1 TABLET (10 MG) BY MOUTH DAILY 90 tablet 1    vitamin B-Complex Take 1 tablet by mouth daily.       No current facility-administered medications for this visit.       ROS:  4 point ROS including Respiratory, CV, GI and , other than that noted in the HPI,  is negative    Vitals:  /63   Pulse 85   Temp 97.5  F (36.4  C)   Resp 17   Ht 1.702 m (5' 7\")   Wt 72.5 kg (159 lb 12.8 oz)   LMP  (LMP Unknown)   SpO2 96%   BMI 25.03 kg/m    Exam:  GENERAL APPEARANCE:  Alert, elderly, in no distress, sitting in wheelchair  HEENT:  atraumatic, [place], EOM intact, moist mucus membranes  RESP:  non-labored breathing, lungs clear on auscultation, no respiratory distress, no cough  CV:  Rate regular, S1 S2 noted, L>R edema  ABDOMEN:  soft, non-distended, non-tender, bowel sounds active  M/S:  wheelchair bound, moves all extremities, strength and tone equal bilaterally, no calf pain, arthritic changes noted in hands  SKIN:  warm, dry, thin, fragile, no obvious rash, lesions, ulcerations or petechiae   NEURO:   Face is symmetric, examination of sensation by touch normal, follows and tracks, slow speech  PSYCH:  calm, cooperative      Lab/Diagnostic data:  Labs reviewed as per [facility] and/or Care Everywhere.      ASSESSMENT/PLAN:     Closed fracture of neck of right femur with routine healing, subsequent encounter  Acute post-operative pain   S/p right hip nailing on 9/12; follow-up with Ortho in 3 weeks.  -WBAT  -ASA 81 mg twice daily as ordered for 3 months  -Continue postsurgical wound cares as " ordered  -Continue oxycodone 5 mg every 6 hours as needed; wean as able  -Start APAP 1000 mg 3 times daily  -follow-up with orthopedic trauma clinic in [place] as recommended    Anemia due to blood loss, acute  Discharged with hemoglobin 10.2 and WBC 12.4 (9/16).  In TCU, no signs of active bleeding. VSS.  -monitor bleeding risks  -recheck CBC on Monday    Primary hypertension  Hypercholesterolemia  History of stroke  //73 mmHg. HR 75-86 bpm.  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   -monitor mood and behaviors    Physical deconditioning  Secondary to diagnoses above and recent hospitalization. In TCU for rehabilitation.   -PT/OT eval and treat - adv per recommendations   -SW available for safe discharge planning ongoing        50 minutes spent on the date of this encounter doing chart review, review labs, discussion with nursing staff, patient visit, and documentation.       Electronically signed by:  Dr. Juliet Andrade, DNP, APRN, AGNP-BC, PHN    This note was completed with the assistance of dictation software. Typos and word substitution-errors are expected and unintended.

## 2024-09-18 NOTE — PROGRESS NOTES
Clinic Care Coordination Contact  Care Coordination Transition Communication    Clinical Data: Patient was hospitalized at   Date Type Department  Description   09/12/2024 9:06 AM CDT - 09/16/2024 1:56 PM CDT Hospital Encounter [address] [address]   [phone]   Closed fracture of right hip, initial encounter (HC) (Primary Dx)  Discharge Disposition: Skilled Nursing Facility     Assessment: Patient has transitioned to TCU/ARU for short term rehabilitation:    Facility Name: [facility]  Transition Communication:  Notified facility of Primary Care- Care Coordination support via Epic fax.    Plan: Care Coordinator will await notification from facility staff informing of patient's discharge plans/needs. Care Coordinator will review chart and outreach to facility staff every 4 weeks and as needed.     Mara Frank,   [facility]  [phone]

## 2024-09-19 ENCOUNTER — DOCUMENTATION ONLY (OUTPATIENT)
Dept: GERIATRICS | Facility: CLINIC | Age: 84
End: 2024-09-19

## 2024-09-19 ENCOUNTER — TRANSITIONAL CARE UNIT VISIT (OUTPATIENT)
Dept: GERIATRICS | Facility: CLINIC | Age: 84
End: 2024-09-19
Payer: MEDICARE

## 2024-09-19 VITALS
RESPIRATION RATE: 17 BRPM | HEIGHT: 67 IN | HEART RATE: 85 BPM | SYSTOLIC BLOOD PRESSURE: 138 MMHG | WEIGHT: 159.8 LBS | BODY MASS INDEX: 25.08 KG/M2 | TEMPERATURE: 97.5 F | DIASTOLIC BLOOD PRESSURE: 63 MMHG | OXYGEN SATURATION: 96 %

## 2024-09-19 DIAGNOSIS — I10 PRIMARY HYPERTENSION: ICD-10-CM

## 2024-09-19 DIAGNOSIS — D62 ANEMIA DUE TO BLOOD LOSS, ACUTE: ICD-10-CM

## 2024-09-19 DIAGNOSIS — S72.001D CLOSED FRACTURE OF NECK OF RIGHT FEMUR WITH ROUTINE HEALING, SUBSEQUENT ENCOUNTER: Primary | ICD-10-CM

## 2024-09-19 DIAGNOSIS — F32.5 MAJOR DEPRESSIVE DISORDER, SINGLE EPISODE IN FULL REMISSION (H): ICD-10-CM

## 2024-09-19 DIAGNOSIS — G89.18 ACUTE POST-OPERATIVE PAIN: ICD-10-CM

## 2024-09-19 DIAGNOSIS — Z86.73 HISTORY OF STROKE: ICD-10-CM

## 2024-09-19 DIAGNOSIS — E78.00 HYPERCHOLESTEROLEMIA: Chronic | ICD-10-CM

## 2024-09-19 DIAGNOSIS — R53.81 PHYSICAL DECONDITIONING: ICD-10-CM

## 2024-09-19 PROCEDURE — 99310 SBSQ NF CARE HIGH MDM 45: CPT

## 2024-09-19 RX ORDER — OXYCODONE HYDROCHLORIDE 5 MG/1
5 CAPSULE ORAL EVERY 6 HOURS PRN
COMMUNITY
End: 2024-10-02

## 2024-09-19 RX ORDER — ACETAMINOPHEN 500 MG
1000 TABLET ORAL 3 TIMES DAILY
COMMUNITY

## 2024-09-19 RX ORDER — IBUPROFEN 200 MG
2 CAPSULE ORAL DAILY
COMMUNITY

## 2024-09-19 RX ORDER — METOPROLOL SUCCINATE 50 MG/1
50 TABLET, EXTENDED RELEASE ORAL DAILY
COMMUNITY

## 2024-09-19 NOTE — LETTER
" 9/19/2024      Ellen Felipe  [address]  [place] [address]        [facility] GERIATRICS    PRIMARY CARE PROVIDER AND CLINIC:  Brenda Godoy MD, [address] [facility] [address]  Chief Complaint   Patient presents with     Hospital F/U      [place] Medical Record Number:  [id number]  Place of Service where encounter took place:  [facility] ([facility]) [[id number]]    HPI:   Ellen Felipe  is a 84 year old  (1940), admitted to the above facility from  [facility]. Hospital stay 9/12/24 through 9/16/24. HPI information obtained from: facility chart records, facility staff, patient report and [facility] chart review. PMH: aortic stenosis s/p TAVR (June 2022), HTN, HLD, Hx of stroke. Presented to ED after falling and developed right-sided hip pain. X-ray of hip showed moderately displaced and angulated comminuted fracture of the proximal diaphysis/neck of the right femur with involvement of the lesser trochanter.  Underwent intramedullary nailing of right subtrochanteric femur on 9/12/2024.  Postop acute blood loss anemia requiring PRBC transfusion with resolution.  Discharged to this facility for medical management, rehabilitation, and nursing care. New on oxycodone.    Today:  Nursing staff reports no concerns.    Patient is seen today for a visit in her room she is sitting in a wheelchair watching television.  Feels \"pretty good\".  Worked with therapies this morning; reportedly walked in the gym.  Pain is rated 4/10 at rest; she notes \"trying to stay away\" from narcotics; receptive to schedule Tylenol. Appetite is \"too good\".  Last BM was \"yesterday.. might have one today\". Sleeping \"pretty good\".  Mood is \"not too bad\"; reported, \"I fought with depression over the years but now I'm okay\". Denies CP, palpitations, lightheadedness, dizziness, fatigue, SOB, fever, chills, nausea/vomiting, bladder concerns today.      CODE STATUS/ADVANCE DIRECTIVES DISCUSSION:  No " Order  CPR/Full code   ALLERGIES:   Allergies   Allergen Reactions     Iron Dextran Swelling     Hydrochlorothiazide Other (See Comments)     Hyponatremia, hypokalemia     Lactose Diarrhea     Sertraline Diarrhea     Bactrim [Sulfamethoxazole-Trimethoprim] Hives and Rash      PAST MEDICAL HISTORY:   Past Medical History:   Diagnosis Date     Aortic valve stenosis     TAVR 6/2022     Arthritis      Cerebral artery occlusion with cerebral infarction (H)      Depression      Essential hypertension     Created by Conversion  Replacement Utility updated for latest IMO load     Heart murmur      Hyperlipidemia      IBS (irritable bowel syndrome)      Iron deficiency anemia due to chronic blood loss 10/26/2022     OCD (obsessive compulsive disorder) 04/10/2015     OCD (obsessive compulsive disorder)       PAST SURGICAL HISTORY:   has a past surgical history that includes REMOVE TONSILS/ADENOIDS,<14 Y/O and Aortic Valve Surgery (06/2022).  FAMILY HISTORY: family history includes Diabetes Type 2  in her brother, brother, and sister; Heart Failure in her father and mother.  SOCIAL HISTORY:   reports that she has never smoked. She has never used smokeless tobacco. She reports current alcohol use of about 0.8 standard drinks of alcohol per week. She reports that she does not use drugs.  Patient's living condition: lives with spouse in senior living facility    Post Discharge Medication Reconciliation Status:   MED REC REQUIRED  Post Medication Reconciliation Status: discharge medications reconciled and changed, per note/orders       Current Outpatient Medications   Medication Sig Dispense Refill     acetaminophen (TYLENOL) 500 MG tablet Take 1,000 mg by mouth 3 times daily.       amLODIPine (NORVASC) 2.5 MG tablet TAKE 1 TABLET (2.5 MG) BY MOUTH DAILY 90 tablet 3     amoxicillin (AMOXIL) 500 MG capsule Take 2,000 mg by mouth See Admin Instructions       aspirin (ASA) 81 MG EC tablet Take 1 tablet (81 mg) by mouth daily (Patient  "taking differently: Take 81 mg by mouth 2 times daily. For 3 months per ortho)       calcium 600 MG tablet Take 2 tablets by mouth daily.       cholecalciferol (VITAMIN D3) 25 mcg (1000 units) capsule Take 1 capsule by mouth daily.       metoprolol succinate ER (TOPROL XL) 50 MG 24 hr tablet Take 50 mg by mouth daily.       Multiple Vitamin (MULTIVITAMIN PO) Take 1 chew tab by mouth daily.       oxyCODONE (OXY-IR) 5 MG capsule Take 5 mg by mouth every 6 hours as needed for severe pain.       rosuvastatin (CRESTOR) 10 MG tablet TAKE 1 TABLET (10 MG) BY MOUTH DAILY 90 tablet 1     vitamin B-Complex Take 1 tablet by mouth daily.       No current facility-administered medications for this visit.       ROS:  4 point ROS including Respiratory, CV, GI and , other than that noted in the HPI,  is negative    Vitals:  /63   Pulse 85   Temp 97.5  F (36.4  C)   Resp 17   Ht 1.702 m (5' 7\")   Wt 72.5 kg (159 lb 12.8 oz)   LMP  (LMP Unknown)   SpO2 96%   BMI 25.03 kg/m    Exam:  GENERAL APPEARANCE:  Alert, elderly, in no distress, sitting in wheelchair  HEENT:  atraumatic, [place], EOM intact, moist mucus membranes  RESP:  non-labored breathing, lungs clear on auscultation, no respiratory distress, no cough  CV:  Rate regular, S1 S2 noted, L>R edema  ABDOMEN:  soft, non-distended, non-tender, bowel sounds active  M/S:  wheelchair bound, moves all extremities, strength and tone equal bilaterally, no calf pain, arthritic changes noted in hands  SKIN:  warm, dry, thin, fragile, no obvious rash, lesions, ulcerations or petechiae   NEURO:   Face is symmetric, examination of sensation by touch normal, follows and tracks, slow speech  PSYCH:  calm, cooperative      Lab/Diagnostic data:  Labs reviewed as per [facility] and/or Care Everywhere.      ASSESSMENT/PLAN:     Closed fracture of neck of right femur with routine healing, subsequent encounter  Acute post-operative pain   S/p right hip nailing on 9/12; follow-up with Ortho in " 3 weeks.  -WBAT  -ASA 81 mg twice daily as ordered for 3 months  -Continue postsurgical wound cares as ordered  -Continue oxycodone 5 mg every 6 hours as needed; wean as able  -Start APAP 1000 mg 3 times daily  -follow-up with orthopedic trauma clinic in [place] as recommended    Anemia due to blood loss, acute  Discharged with hemoglobin 10.2 and WBC 12.4 (9/16).  In TCU, no signs of active bleeding. VSS.  -monitor bleeding risks  -recheck CBC on Monday    Primary hypertension  Hypercholesterolemia  History of stroke  //73 mmHg. HR 75-86 bpm.  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   -monitor mood and behaviors    Physical deconditioning  Secondary to diagnoses above and recent hospitalization. In TCU for rehabilitation.   -PT/OT eval and treat - adv per recommendations   -SW available for safe discharge planning ongoing        50 minutes spent on the date of this encounter doing chart review, review labs, discussion with nursing staff, patient visit, and documentation.       Electronically signed by:  Dr. Juliet Andrade, DNP, APRN, AGNP-BC, PHN    This note was completed with the assistance of dictation software. Typos and word substitution-errors are expected and unintended.                      Sincerely,        Juliet Andrade, UMANG CNP

## 2024-09-23 ENCOUNTER — TRANSITIONAL CARE UNIT VISIT (OUTPATIENT)
Dept: GERIATRICS | Facility: CLINIC | Age: 84
End: 2024-09-23
Payer: MEDICARE

## 2024-09-23 ENCOUNTER — LAB REQUISITION (OUTPATIENT)
Dept: LAB | Facility: CLINIC | Age: 84
End: 2024-09-23
Payer: COMMERCIAL

## 2024-09-23 VITALS
HEART RATE: 88 BPM | OXYGEN SATURATION: 99 % | SYSTOLIC BLOOD PRESSURE: 125 MMHG | TEMPERATURE: 96.8 F | BODY MASS INDEX: 25.08 KG/M2 | RESPIRATION RATE: 18 BRPM | WEIGHT: 159.8 LBS | HEIGHT: 67 IN | DIASTOLIC BLOOD PRESSURE: 76 MMHG

## 2024-09-23 DIAGNOSIS — R15.9 BOWEL AND BLADDER INCONTINENCE: ICD-10-CM

## 2024-09-23 DIAGNOSIS — S72.001D CLOSED FRACTURE OF NECK OF RIGHT FEMUR WITH ROUTINE HEALING, SUBSEQUENT ENCOUNTER: ICD-10-CM

## 2024-09-23 DIAGNOSIS — R53.81 PHYSICAL DECONDITIONING: ICD-10-CM

## 2024-09-23 DIAGNOSIS — M80.00XD AGE-RELATED OSTEOPOROSIS WITH CURRENT PATHOLOGICAL FRACTURE WITH ROUTINE HEALING, SUBSEQUENT ENCOUNTER: ICD-10-CM

## 2024-09-23 DIAGNOSIS — I10 PRIMARY HYPERTENSION: ICD-10-CM

## 2024-09-23 DIAGNOSIS — R32 BOWEL AND BLADDER INCONTINENCE: ICD-10-CM

## 2024-09-23 DIAGNOSIS — D62 ANEMIA DUE TO BLOOD LOSS, ACUTE: ICD-10-CM

## 2024-09-23 DIAGNOSIS — F42.9 OBSESSIVE-COMPULSIVE DISORDER, UNSPECIFIED TYPE: Chronic | ICD-10-CM
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
## 2024-09-23 DIAGNOSIS — K58.0 IRRITABLE BOWEL SYNDROME WITH DIARRHEA: ICD-10-CM

## 2024-09-23 DIAGNOSIS — S72.21XD DISPLACED SUBTROCHANTERIC FRACTURE OF RIGHT FEMUR, SUBSEQUENT ENCOUNTER FOR CLOSED FRACTURE WITH ROUTINE HEALING: ICD-10-CM

## 2024-09-23 DIAGNOSIS — F41.1 GENERALIZED ANXIETY DISORDER: Chronic | ICD-10-CM

## 2024-09-23 DIAGNOSIS — Z95.2 S/P TAVR (TRANSCATHETER AORTIC VALVE REPLACEMENT): ICD-10-CM

## 2024-09-23 DIAGNOSIS — Z86.73 HISTORY OF STROKE: ICD-10-CM

## 2024-09-23 PROBLEM — N30.00 ACUTE CYSTITIS WITHOUT HEMATURIA: Status: RESOLVED | Noted: 2019-06-06 | Resolved: 2024-09-23

## 2024-09-23 PROCEDURE — 99306 1ST NF CARE HIGH MDM 50: CPT | Performed by: FAMILY MEDICINE

## 2024-09-23 NOTE — LETTER
" 9/23/2024      Ellen Felipe  [address]  [place] [address]        [facility] GERIATRICS    PRIMARY CARE PROVIDER AND CLINIC:  Brenda Godoy MD, [address] [facility] [address] [facility] [address]  Chief Complaint   Patient presents with     Hospital F/U      [place] Medical Record Number:  [id number]  Place of Service where encounter took place:  [facility] (SNF) [[id number]]    Ellen \"Meghan\" KRYSTAL Felipe  is a 84 year old  (1940), former [place] , with pmhx including  Stokre May 2022 without significant residual effects  Severe aortic stenosis s/p TAVR June 2022  HARDY, OCD with regular counseling  IBS and possilbe collagenous colitis recently seen by [facility]  Prior DEXA scan with T-score -1.9    Who was admitted to the above facility from  [facility]. Hospital stay 9/12/24 through 9/16/24..     HPI:    Hospital course  Presented after she tripped and fell on loose carpet and landed on her right side with associated hip pain. No head injury or LOC. She was unable to get up on her own and brought to the ED. Imaging in the ED showed right proximal  diaphysis/neck femoral fracture with moderate displacement and angulation. BMP normal. Orthopedic surgery was consulted and she underwent ORIF with IMN 9/12/24 without complications. Relatively uncomplicated post-operative course except for blood loss anemia. Hemoglobin mindy 7.4 (from 11,0) for which she received 2 units PRBC with discharge level of 10.2. No other concerns, stable to discharge for ongoing rehabilitation.     Today  Previously seen by my colleague without significant concerns. Adjustments to tylenol made, labs ordered though not drawn today.     Today says she is overall doing well.  She has been having some phlegm and cough but has found cough syrup helpful.  She has no known allergies.  No dysphagia.  No fevers or chills, no rhinorrhea.  No chest pain or shortness of breath.    Her pain is overall doing " better.  Says it is a 5 out of 10.  Has been ambulating about 25 to 50 feet with therapies.  She does get some soreness and fatigues easily.  No severe pain however.    Her appetite is doing well.  She has no abdominal pain.  No she is always had softer stools but otherwise they are normal.  She can have urgency of her bowels with occasional accidents.  She has no current urinary symptoms, no dysuria hematuria, though intermittent urge incontinence.    She discusses and recalls having her stroke in May 2022.  Says following this she did have decreased mood.  However no longer using antidepressants following her stroke.  Currently no mood concerns.  She does have counselor she sees regularly.    Functional Review  She had been living at home with her  independently.  He would assist her as needed particularly with heavier chores.  Additionally she does not cook.  She does continue to drive with accommodations following her prior stroke and evaluation.  She and her  moved into an apartment from their prior house about 1 year ago to assist with downsizing.  They have been  for about 32 years.  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  She now enjoys spending her time going to [employer], volunteering weekly, and attending exercise classes.  She notes she was not using a walker prior to this.    CODE STATUS/ADVANCE DIRECTIVES DISCUSSION: CPR/Full code   ALLERGIES:   Allergies   Allergen Reactions     Iron Dextran Swelling     Hydrochlorothiazide Other (See Comments)     Hyponatremia, hypokalemia     Lactose Diarrhea     Sertraline Diarrhea     Bactrim [Sulfamethoxazole-Trimethoprim] Hives and Rash      PAST MEDICAL HISTORY:   Past Medical History:   Diagnosis Date     Aortic valve stenosis     TAVR 6/2022     Arthritis      Cerebral artery occlusion with cerebral infarction (H)      Depression       Essential hypertension     Created by Conversion  Replacement Utility updated for latest IMO load     Heart murmur      Hyperlipidemia      IBS (irritable bowel syndrome)      Iron deficiency anemia due to chronic blood loss 10/26/2022     OCD (obsessive compulsive disorder) 04/10/2015     OCD (obsessive compulsive disorder)       PAST SURGICAL HISTORY:   has a past surgical history that includes REMOVE TONSILS/ADENOIDS,<14 Y/O and Aortic Valve Surgery (06/2022).  FAMILY HISTORY: family history includes Diabetes Type 2  in her brother, brother, and sister; Heart Failure in her father and mother.  SOCIAL HISTORY:   reports that she has never smoked. She has never used smokeless tobacco. She reports current alcohol use of about 0.8 standard drinks of alcohol per week. She reports that she does not use drugs.  Patient's living condition: lives with spouse    Post Discharge Medication Reconciliation Status:   MED REC REQUIRED  Post Medication Reconciliation Status: medication reconcilation previously completed during another office visit       Current Outpatient Medications   Medication Sig Dispense Refill     acetaminophen (TYLENOL) 500 MG tablet Take 1,000 mg by mouth 3 times daily.       amLODIPine (NORVASC) 2.5 MG tablet TAKE 1 TABLET (2.5 MG) BY MOUTH DAILY 90 tablet 3     amoxicillin (AMOXIL) 500 MG capsule Take 2,000 mg by mouth See Admin Instructions       aspirin (ASA) 81 MG EC tablet Take 1 tablet (81 mg) by mouth daily (Patient taking differently: Take 81 mg by mouth 2 times daily. For 3 months per ortho)       calcium 600 MG tablet Take 2 tablets by mouth daily.       cholecalciferol (VITAMIN D3) 25 mcg (1000 units) capsule Take 1 capsule by mouth daily.       metoprolol succinate ER (TOPROL XL) 50 MG 24 hr tablet Take 50 mg by mouth daily.       Multiple Vitamin (MULTIVITAMIN PO) Take 1 chew tab by mouth daily.       oxyCODONE (OXY-IR) 5 MG capsule Take 5 mg by mouth every 6 hours as needed for severe pain.  "      rosuvastatin (CRESTOR) 10 MG tablet TAKE 1 TABLET (10 MG) BY MOUTH DAILY 90 tablet 1     vitamin B-Complex Take 1 tablet by mouth daily.       No current facility-administered medications for this visit.       Vitals:  /76   Pulse 88   Temp 96.8  F (36  C)   Resp 18   Ht 1.702 m (5' 7\")   Wt 72.5 kg (159 lb 12.8 oz)   LMP  (LMP Unknown)   SpO2 99%   BMI 25.03 kg/m    Exam:    GENERAL APPEARANCE: Seated comfortably, NAD  HENT:  NCAT, mildly [place], good dentition  EYES:  Conjunctiva clear, anicteric, EOMI, PERRL  PULM  Normal WOB on RA, lungs CTAB, no wheezes or crackles  CV:  RRR, S1/S2 normal, no murmurs; trace LE edema;  ABDOMEN: Abdomen soft, not tender, not distended, BS normal and active throughout   M/S:  Normal AROM of UEs  NEURO: Alert, answering questions appropriately, normal thought processes; CN II-XII grossly intact, 5/5 strength bilateral UEs, strength pain limited in LEs  PSYCH:  Mood anxious with congruent affect    Lab/Diagnostic data:  Recent labs in [facility] reviewed by me today.     ASSESSMENT/PLAN:    (W19.XXXD) Fall, subsequent encounter  (primary encounter diagnosis)  (S72.001D) Closed fracture of neck of right femur with routine healing, subsequent encounter  Comment: Primary reason for admission with fall due to hazardous environment with subsequent right femoral neck fracture.  Status post ORIF, without significant complications except blood loss anemia.  Progressing well with therapies.  Pain well-controlled.  Plan:   -Follow-up with orthopedic surgery in 2 weeks  -Continue 81 mg aspirin twice daily  -PT/OT  -Tylenol 1000 mg 3 times daily, oxycodone 5 mg as needed    (M80.00XD) Age-related osteoporosis with current pathological fracture with routine healing, subsequent encounter  Comment: Consistent with the above.  She has had multiple DEXA scans in the past all with T-scores borderline, most recently -1.9.  Continues on the vitamin calcium D appropriately.  Most recent " vitamin D level slightly elevated 55 1 year ago.  TSH reasonable.  No concerns on CBC during admission.  Plan:   -BMP, TSH, PTH, vitamin D  -Would likely start bisphosphonate pending lab results    (R53.81) Physical deconditioning  Comment: Acute deconditioning due to fracture and postoperative course.  Advancing well with therapies  Plan:   -PT/OT    (D62) Anemia due to blood loss, acute  Comment: Moderate blood loss following surgery as expected.  Hemoglobin 7.2 during hospitalization, received 2 units PRBC.  Discharged with hemoglobin 10.2.  Plan:   -CBC    (Z86.73) History of stroke  Comment: Stroke without significant residual symptoms in May 2022.  Continues on aspirin and statin appropriately.  Plan:   -Revert to daily aspirin when DVT prophylactic dosing is complete    (F42.9) Obsessive-compulsive disorder, unspecified type  (F41.1) Generalized anxiety disorder  Comment: History of OCD and anxiety for which she used to be on SSRIs, however recently doing well without these.  Continues to get therapy regularly and overall doing well    (K58.0) Irritable bowel syndrome with diarrhea  Comment: History of IBS with loose stool and intermittent episodes of incontinence.  Was recently seen by [facility] with concerns for possible collagenous colitis, though this noted from an outside colonoscopy from which they did not yet have records.  Currently not needing oral steroids.  Doing well with as needed fiber and Imodium    (R32,  R15.9) Bowel and bladder incontinence  Comment: Does note urge urinary incontinence in addition to urge bowel incontinence.  Plan:   -Strongly consider pelvic physical therapy outpatient    (I10) Primary hypertension  Comment: Reasonably controlled on current regimen    (Z95.2) S/P TAVR (transcatheter aortic valve replacement)  Comment: Recently seen by Dr. Julio with cardiology for follow-up.  No concerns at that time.  Functioning appropriately on exam.  Plan:   -Continue aspirin as  above    Orders:  [x] BMP, PTH, Vit D, TSH; CBC    Electronically signed by:    Benjamin Rosenstein, MD, MA  [facility]     This note was completed with the assistance of dictation software. Typos and word substitution-errors are expected and unintended.      70 MINUTES SPENT BY ME on the date of service doing chart review, history, exam, documentation & further activities per the note.              Sincerely,        Benjamin Rosenstein, MD       Face Mask

## 2024-09-23 NOTE — PROGRESS NOTES
"[facility] GERIATRICS    PRIMARY CARE PROVIDER AND CLINIC:  Brenda Godoy MD, [address] [facility] [address]  Chief Complaint   Patient presents with    Hospital F/U      [place] Medical Record Number:  [id number]  Place of Service where encounter took place:  [facility] ([facility]) [[id number]]    Ellen \"Meghan\" KRYSTAL Felipe  is a 84 year old  (1940), former [place] , with pmhx including  Stokre May 2022 without significant residual effects  Severe aortic stenosis s/p TAVR June 2022  HARDY, OCD with regular counseling  IBS and possilbe collagenous colitis recently seen by [facility]  Prior DEXA scan with T-score -1.9    Who was admitted to the above facility from  [facility]. Hospital stay 9/12/24 through 9/16/24..     HPI:    Hospital course  Presented after she tripped and fell on loose carpet and landed on her right side with associated hip pain. No head injury or LOC. She was unable to get up on her own and brought to the ED. Imaging in the ED showed right proximal  diaphysis/neck femoral fracture with moderate displacement and angulation. BMP normal. Orthopedic surgery was consulted and she underwent ORIF with IMN 9/12/24 without complications. Relatively uncomplicated post-operative course except for blood loss anemia. Hemoglobin mindy 7.4 (from 11,0) for which she received 2 units PRBC with discharge level of 10.2. No other concerns, stable to discharge for ongoing rehabilitation.     Today  Previously seen by my colleague without significant concerns. Adjustments to tylenol made, labs ordered though not drawn today.     Today says she is overall doing well.  She has been having some phlegm and cough but has found cough syrup helpful.  She has no known allergies.  No dysphagia.  No fevers or chills, no rhinorrhea.  No chest pain or shortness of breath.    Her pain is overall doing better.  Says it is a 5 out of 10.  Has been ambulating about 25 to 50 feet with therapies.  " She does get some soreness and fatigues easily.  No severe pain however.    Her appetite is doing well.  She has no abdominal pain.  No she is always had softer stools but otherwise they are normal.  She can have urgency of her bowels with occasional accidents.  She has no current urinary symptoms, no dysuria hematuria, though intermittent urge incontinence.    She discusses and recalls having her stroke in May 2022.  Says following this she did have decreased mood.  However no longer using antidepressants following her stroke.  Currently no mood concerns.  She does have counselor she sees regularly.    Functional Review  She had been living at home with her  independently.  He would assist her as needed particularly with heavier chores.  Additionally she does not cook.  She does continue to drive with accommodations following her prior stroke and evaluation.  She and her  moved into an apartment from their prior house about 1 year ago to assist with downsizing.  They have been  for about 32 years.  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  She now enjoys spending her time going to [employer], volunteering weekly, and attending exercise classes.  She notes she was not using a walker prior to this.    CODE STATUS/ADVANCE DIRECTIVES DISCUSSION: CPR/Full code   ALLERGIES:   Allergies   Allergen Reactions    Iron Dextran Swelling    Hydrochlorothiazide Other (See Comments)     Hyponatremia, hypokalemia    Lactose Diarrhea    Sertraline Diarrhea    Bactrim [Sulfamethoxazole-Trimethoprim] Hives and Rash      PAST MEDICAL HISTORY:   Past Medical History:   Diagnosis Date    Aortic valve stenosis     TAVR 6/2022    Arthritis     Cerebral artery occlusion with cerebral infarction (H)     Depression     Essential hypertension     Created by Conversion  Replacement Utility updated for latest IMO load    Heart  murmur     Hyperlipidemia     IBS (irritable bowel syndrome)     Iron deficiency anemia due to chronic blood loss 10/26/2022    OCD (obsessive compulsive disorder) 04/10/2015    OCD (obsessive compulsive disorder)       PAST SURGICAL HISTORY:   has a past surgical history that includes REMOVE TONSILS/ADENOIDS,<14 Y/O and Aortic Valve Surgery (06/2022).  FAMILY HISTORY: family history includes Diabetes Type 2  in her brother, brother, and sister; Heart Failure in her father and mother.  SOCIAL HISTORY:   reports that she has never smoked. She has never used smokeless tobacco. She reports current alcohol use of about 0.8 standard drinks of alcohol per week. She reports that she does not use drugs.  Patient's living condition: lives with spouse    Post Discharge Medication Reconciliation Status:   MED REC REQUIRED  Post Medication Reconciliation Status: medication reconcilation previously completed during another office visit       Current Outpatient Medications   Medication Sig Dispense Refill    acetaminophen (TYLENOL) 500 MG tablet Take 1,000 mg by mouth 3 times daily.      amLODIPine (NORVASC) 2.5 MG tablet TAKE 1 TABLET (2.5 MG) BY MOUTH DAILY 90 tablet 3    amoxicillin (AMOXIL) 500 MG capsule Take 2,000 mg by mouth See Admin Instructions      aspirin (ASA) 81 MG EC tablet Take 1 tablet (81 mg) by mouth daily (Patient taking differently: Take 81 mg by mouth 2 times daily. For 3 months per ortho)      calcium 600 MG tablet Take 2 tablets by mouth daily.      cholecalciferol (VITAMIN D3) 25 mcg (1000 units) capsule Take 1 capsule by mouth daily.      metoprolol succinate ER (TOPROL XL) 50 MG 24 hr tablet Take 50 mg by mouth daily.      Multiple Vitamin (MULTIVITAMIN PO) Take 1 chew tab by mouth daily.      oxyCODONE (OXY-IR) 5 MG capsule Take 5 mg by mouth every 6 hours as needed for severe pain.      rosuvastatin (CRESTOR) 10 MG tablet TAKE 1 TABLET (10 MG) BY MOUTH DAILY 90 tablet 1    vitamin B-Complex Take 1  "tablet by mouth daily.       No current facility-administered medications for this visit.       Vitals:  /76   Pulse 88   Temp 96.8  F (36  C)   Resp 18   Ht 1.702 m (5' 7\")   Wt 72.5 kg (159 lb 12.8 oz)   LMP  (LMP Unknown)   SpO2 99%   BMI 25.03 kg/m    Exam:    GENERAL APPEARANCE: Seated comfortably, NAD  HENT:  NCAT, mildly [place], good dentition  EYES:  Conjunctiva clear, anicteric, EOMI, PERRL  PULM  Normal WOB on RA, lungs CTAB, no wheezes or crackles  CV:  RRR, S1/S2 normal, no murmurs; trace LE edema;  ABDOMEN: Abdomen soft, not tender, not distended, BS normal and active throughout   M/S:  Normal AROM of UEs  NEURO: Alert, answering questions appropriately, normal thought processes; CN II-XII grossly intact, 5/5 strength bilateral UEs, strength pain limited in LEs  PSYCH:  Mood anxious with congruent affect    Lab/Diagnostic data:  Recent labs in [facility] reviewed by me today.     ASSESSMENT/PLAN:    (W19.XXXD) Fall, subsequent encounter  (primary encounter diagnosis)  (S72.001D) Closed fracture of neck of right femur with routine healing, subsequent encounter  Comment: Primary reason for admission with fall due to hazardous environment with subsequent right femoral neck fracture.  Status post ORIF, without significant complications except blood loss anemia.  Progressing well with therapies.  Pain well-controlled.  Plan:   -Follow-up with orthopedic surgery in 2 weeks  -Continue 81 mg aspirin twice daily  -PT/OT  -Tylenol 1000 mg 3 times daily, oxycodone 5 mg as needed    (M80.00XD) Age-related osteoporosis with current pathological fracture with routine healing, subsequent encounter  Comment: Consistent with the above.  She has had multiple DEXA scans in the past all with T-scores borderline, most recently -1.9.  Continues on the vitamin calcium D appropriately.  Most recent vitamin D level slightly elevated 55 1 year ago.  TSH reasonable.  No concerns on CBC during admission.  Plan:   -BMP, TSH, " PTH, vitamin D  -Would likely start bisphosphonate pending lab results    (R53.81) Physical deconditioning  Comment: Acute deconditioning due to fracture and postoperative course.  Advancing well with therapies  Plan:   -PT/OT    (D62) Anemia due to blood loss, acute  Comment: Moderate blood loss following surgery as expected.  Hemoglobin 7.2 during hospitalization, received 2 units PRBC.  Discharged with hemoglobin 10.2.  Plan:   -CBC    (Z86.73) History of stroke  Comment: Stroke without significant residual symptoms in May 2022.  Continues on aspirin and statin appropriately.  Plan:   -Revert to daily aspirin when DVT prophylactic dosing is complete    (F42.9) Obsessive-compulsive disorder, unspecified type  (F41.1) Generalized anxiety disorder  Comment: History of OCD and anxiety for which she used to be on SSRIs, however recently doing well without these.  Continues to get therapy regularly and overall doing well    (K58.0) Irritable bowel syndrome with diarrhea  Comment: History of IBS with loose stool and intermittent episodes of incontinence.  Was recently seen by [facility] with concerns for possible collagenous colitis, though this noted from an outside colonoscopy from which they did not yet have records.  Currently not needing oral steroids.  Doing well with as needed fiber and Imodium    (R32,  R15.9) Bowel and bladder incontinence  Comment: Does note urge urinary incontinence in addition to urge bowel incontinence.  Plan:   -Strongly consider pelvic physical therapy outpatient    (I10) Primary hypertension  Comment: Reasonably controlled on current regimen    (Z95.2) S/P TAVR (transcatheter aortic valve replacement)  Comment: Recently seen by Dr. Julio with cardiology for follow-up.  No concerns at that time.  Functioning appropriately on exam.  Plan:   -Continue aspirin as above    Orders:  [x] BMP, PTH, Vit D, TSH; CBC    Electronically signed by:    Benjamin Rosenstein, MD, MA  [facility]     This note was completed with the assistance of dictation software. Typos and word substitution-errors are expected and unintended.      70 MINUTES SPENT BY ME on the date of service doing chart review, history, exam, documentation & further activities per the note.

## 2024-09-24 ENCOUNTER — TELEPHONE (OUTPATIENT)
Dept: GERIATRICS | Facility: CLINIC | Age: 84
End: 2024-09-24

## 2024-09-24 LAB
ANION GAP SERPL CALCULATED.3IONS-SCNC: 11 MMOL/L (ref 7–15)
BUN SERPL-MCNC: 24.9 MG/DL (ref 8–23)
CALCIUM SERPL-MCNC: 9.9 MG/DL (ref 8.8–10.4)
CHLORIDE SERPL-SCNC: 103 MMOL/L (ref 98–107)
CREAT SERPL-MCNC: 0.9 MG/DL (ref 0.51–0.95)
EGFRCR SERPLBLD CKD-EPI 2021: 63 ML/MIN/1.73M2
ERYTHROCYTE [DISTWIDTH] IN BLOOD BY AUTOMATED COUNT: 15.6 % (ref 10–15)
GLUCOSE SERPL-MCNC: 87 MG/DL (ref 70–99)
HCO3 SERPL-SCNC: 23 MMOL/L (ref 22–29)
HCT VFR BLD AUTO: 34.7 % (ref 35–47)
HGB BLD-MCNC: 10.8 G/DL (ref 11.7–15.7)
MCH RBC QN AUTO: 28 PG (ref 26.5–33)
MCHC RBC AUTO-ENTMCNC: 31.1 G/DL (ref 31.5–36.5)
MCV RBC AUTO: 90 FL (ref 78–100)
PLATELET # BLD AUTO: 353 10E3/UL (ref 150–450)
POTASSIUM SERPL-SCNC: 4.2 MMOL/L (ref 3.4–5.3)
PTH-INTACT SERPL-MCNC: 30 PG/ML (ref 15–65)
RBC # BLD AUTO: 3.86 10E6/UL (ref 3.8–5.2)
SODIUM SERPL-SCNC: 137 MMOL/L (ref 135–145)
TSH SERPL DL<=0.005 MIU/L-ACNC: 3.09 UIU/ML (ref 0.3–4.2)
VIT D+METAB SERPL-MCNC: 50 NG/ML (ref 20–50)
WBC # BLD AUTO: 12.6 10E3/UL (ref 4–11)

## 2024-09-24 PROCEDURE — 36415 COLL VENOUS BLD VENIPUNCTURE: CPT | Performed by: FAMILY MEDICINE

## 2024-09-24 PROCEDURE — 82306 VITAMIN D 25 HYDROXY: CPT | Performed by: FAMILY MEDICINE

## 2024-09-24 PROCEDURE — 80048 BASIC METABOLIC PNL TOTAL CA: CPT | Performed by: FAMILY MEDICINE

## 2024-09-24 PROCEDURE — 84443 ASSAY THYROID STIM HORMONE: CPT | Performed by: FAMILY MEDICINE

## 2024-09-24 PROCEDURE — 85027 COMPLETE CBC AUTOMATED: CPT | Performed by: FAMILY MEDICINE

## 2024-09-24 PROCEDURE — 83970 ASSAY OF PARATHORMONE: CPT | Performed by: FAMILY MEDICINE

## 2024-09-24 PROCEDURE — P9604 ONE-WAY ALLOW PRORATED TRIP: HCPCS | Performed by: FAMILY MEDICINE

## 2024-09-24 NOTE — TELEPHONE ENCOUNTER
[facility] Geriatrics Lab Note     Provider: UMANG Rudolph DNP  Facility: [facility]  Facility Type:  TCU    Allergies   Allergen Reactions    Iron Dextran Swelling    Hydrochlorothiazide Other (See Comments)     Hyponatremia, hypokalemia    Lactose Diarrhea    Sertraline Diarrhea    Bactrim [Sulfamethoxazole-Trimethoprim] Hives and Rash       Labs Reviewed by provider: CBC, Vit D, TSH, BMP     Verbal Order/Direction given by Provider: CBC on Friday 9/27/24     Provider giving Order:  UMANG Rudolph DNP    Verbal Order given to: Jada Hernandes RN

## 2024-09-26 ENCOUNTER — TRANSITIONAL CARE UNIT VISIT (OUTPATIENT)
Dept: GERIATRICS | Facility: CLINIC | Age: 84
End: 2024-09-26
Payer: MEDICARE

## 2024-09-26 VITALS
HEIGHT: 67 IN | TEMPERATURE: 97.1 F | WEIGHT: 169.6 LBS | SYSTOLIC BLOOD PRESSURE: 131 MMHG | OXYGEN SATURATION: 97 % | RESPIRATION RATE: 16 BRPM | DIASTOLIC BLOOD PRESSURE: 74 MMHG | BODY MASS INDEX: 26.62 KG/M2 | HEART RATE: 72 BPM
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
## 2024-09-26 DIAGNOSIS — K58.0 IRRITABLE BOWEL SYNDROME WITH DIARRHEA: ICD-10-CM

## 2024-09-26 DIAGNOSIS — S72.001D CLOSED FRACTURE OF NECK OF RIGHT FEMUR WITH ROUTINE HEALING, SUBSEQUENT ENCOUNTER: Primary | ICD-10-CM

## 2024-09-26 DIAGNOSIS — I10 PRIMARY HYPERTENSION: ICD-10-CM

## 2024-09-26 DIAGNOSIS — Z86.73 HISTORY OF STROKE: ICD-10-CM

## 2024-09-26 DIAGNOSIS — F41.1 GENERALIZED ANXIETY DISORDER: ICD-10-CM

## 2024-09-26 DIAGNOSIS — F32.0 MAJOR DEPRESSIVE DISORDER, SINGLE EPISODE, MILD (H): ICD-10-CM

## 2024-09-26 DIAGNOSIS — D62 ANEMIA DUE TO BLOOD LOSS, ACUTE: ICD-10-CM

## 2024-09-26 DIAGNOSIS — M80.00XD AGE-RELATED OSTEOPOROSIS WITH CURRENT PATHOLOGICAL FRACTURE WITH ROUTINE HEALING, SUBSEQUENT ENCOUNTER: ICD-10-CM

## 2024-09-26 DIAGNOSIS — E78.00 HYPERCHOLESTEROLEMIA: Chronic | ICD-10-CM

## 2024-09-26 DIAGNOSIS — R53.81 PHYSICAL DECONDITIONING: ICD-10-CM

## 2024-09-26 DIAGNOSIS — G89.18 ACUTE POST-OPERATIVE PAIN: ICD-10-CM

## 2024-09-26 DIAGNOSIS — R05.1 ACUTE COUGH: ICD-10-CM

## 2024-09-26 PROCEDURE — 99309 SBSQ NF CARE MODERATE MDM 30: CPT

## 2024-09-26 NOTE — PROGRESS NOTES
"[facility] GERIATRICS    Chief Complaint   Patient presents with    RECHECK        HPI: Ellen Felipe is a 84 year old (1940), who is being seen today for an episodic care visit at: No question data found. HPI information obtained from: facility chart records, facility staff, patient report and [facility] chart review. PMH: aortic stenosis s/p TAVR (June 2022), HTN, HLD, Hx of stroke. Presented to ED after falling and developed right-sided hip pain. X-ray of hip showed moderately displaced and angulated comminuted fracture of the proximal diaphysis/neck of the right femur with involvement of the lesser trochanter.  Underwent intramedullary nailing of right subtrochanteric femur on 9/12/2024.  Postop acute blood loss anemia requiring PRBC transfusion with resolution.  Discharged to this facility for medical management, rehabilitation, and nursing care. New on oxycodone. .     Today:  Nursing staff reports no concern.    Patient is seen today for a visit in her room. Feels \"pretty good\"; notes pain is \"not too bad\". Tylenol TID has been effective. Continues with therapies; walking around the unit. Denies SOB but reports \"small cough\" that is new;  notes it as productive. Appetite is \"too well\". Bowels are moving; endorses chronic intermittent diarrhea; notes her last BM was \"more formed\". No belly pain. Sleeping \"pretty well\". Mood is \"okay\"; feels \"a little\" depression but \"I don't want to take more pills\". Denies SI. Declined in-house ACP; she improves her mood through volunteering and joining group activities in her community. Denies CP, palpitations, lightheadedness, dizziness, fatigue, fever, chills, nausea/vomiting, bladder concerns today.      Allergies, and PMH/PSH reviewed in [facility] today.  REVIEW OF SYSTEMS:  4 point ROS including Respiratory, CV, GI and , other than that noted in the HPI,  is negative    Objective:   /74   Pulse 72   Temp 97.1  F (36.2  C)   Resp 16   Ht 1.702 m " "(5' 7\")   Wt 76.9 kg (169 lb 9.6 oz)   LMP  (LMP Unknown)   SpO2 97%   BMI 26.56 kg/m    GENERAL APPEARANCE:  Alert, elderly, in no distress, sitting in WC  HEENT:  atraumatic, [place], EOM intact, moist mucus membranes  RESP:  non-labored breathing, lungs clear on auscultation, no respiratory distress, occasional cough  CV:  Rate regular, S1 S2 noted, R>L edema  ABDOMEN:  soft, non-distended, non-tender, bowel sounds active  M/S: moves all extremities, strength and tone equal bilaterally, no calf pain, arthritic changes noted in hands  SKIN:  warm, dry, thin, fragile, no obvious rash, lesions, ulcerations or petechiae   NEURO:   Face is symmetric, examination of sensation by touch normal, follows and tracks, slow speech  PSYCH:  calm, cooperative      Labs reviewed as per Epic and/or Care Everywhere.      Assessment/Plan:  Closed fracture of neck of right femur with routine healing, subsequent encounter  Acute post-operative pain   S/p right hip nailing on 9/12; follow-up with Ortho in 3 weeks.  -WBAT  -ASA 81 mg twice daily as ordered for 3 months  -Continue postsurgical wound cares as ordered  -Continue oxycodone 5 mg every 6 hours as needed; wean as able  -continue APAP 1000 mg 3 times daily  -follow-up with orthopedic trauma clinic in [place] as scheduled on 10/2/24    Age-related osteoporosis with current pathological fracture with routine healing, subsequent encounter   BMP with no concerns, TSH 3.09, Vit D 50, and PTH 30 (9/24/24).  -continue calcium 1200 mg daily  -continue vitamin D 1000 unit daily  -start alendronate 70 mg weekly     Anemia due to blood loss, acute  In TCU, hemoglobin improved at 10.8, WBC increased at 12.6 (9/24/24). No signs of active bleeding. VSS.  -monitor bleeding risks  -recheck CBC on 9/27     Primary hypertension  Hypercholesterolemia  History of stroke  //81 mmHg. HR 71-78 bpm.  -ASA as above  -Continue amlodipine 2.5 mg daily  -Continue metoprolol succinate 50 mg " daily  -Continue rosuvastatin 10 mg at bedtime  -Follow BP and HR and adjust medications as needed    Irritable bowel syndrome with diarrhea  Per chart history. Seen by [facility] with concerns for possible collagenous colitis. Today, no concerns.   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 Feels mild depression today; declined intervention.   -monitor mood and behaviors  -consider ACP     Acute cough   Productive. Lungs are clear.  -order covid screen (resulted negative)  -follow clinically  -CBC as above     Fall, subsequent encounter   Physical deconditioning  Secondary to diagnoses above and recent hospitalization. In TCU for rehabilitation. Anticipate discharge next week.   -PT/OT following- adv per recommendations   -SW available for safe discharge planning ongoing    MED REC REQUIRED  Post Medication Reconciliation Status: discharge medications reconciled and changed, per note/orders          36 minutes spent on the date of this encounter doing chart review, review labs, discussion with nursing staff, patient visit, and documentation.       Electronically signed by: Dr. Juliet Andrade, DNP, APRN, AGNP-BC, PHN    This note was completed with the assistance of dictation software. Typos and word substitution-errors are expected and unintended.

## 2024-09-26 NOTE — LETTER
" 9/26/2024      Ellen Felipe  [address]  [place] [address]        [facility] GERIATRICS    Chief Complaint   Patient presents with     RECHECK        HPI: Ellen Felipe is a 84 year old (1940), who is being seen today for an episodic care visit at: No question data found. HPI information obtained from: facility chart records, facility staff, patient report and [facility] chart review. PMH: aortic stenosis s/p TAVR (June 2022), HTN, HLD, Hx of stroke. Presented to ED after falling and developed right-sided hip pain. X-ray of hip showed moderately displaced and angulated comminuted fracture of the proximal diaphysis/neck of the right femur with involvement of the lesser trochanter.  Underwent intramedullary nailing of right subtrochanteric femur on 9/12/2024.  Postop acute blood loss anemia requiring PRBC transfusion with resolution.  Discharged to this facility for medical management, rehabilitation, and nursing care. New on oxycodone. .     Today:  Nursing staff reports no concern.    Patient is seen today for a visit in her room. Feels \"pretty good\"; notes pain is \"not too bad\". Tylenol TID has been effective. Continues with therapies; walking around the unit. Denies SOB but reports \"small cough\" that is new;  notes it as productive. Appetite is \"too well\". Bowels are moving; endorses chronic intermittent diarrhea; notes her last BM was \"more formed\". No belly pain. Sleeping \"pretty well\". Mood is \"okay\"; feels \"a little\" depression but \"I don't want to take more pills\". Denies SI. Declined in-house ACP; she improves her mood through volunteering and joining group activities in her community. Denies CP, palpitations, lightheadedness, dizziness, fatigue, fever, chills, nausea/vomiting, bladder concerns today.      Allergies, and PMH/PSH reviewed in [facility] today.  REVIEW OF SYSTEMS:  4 point ROS including Respiratory, CV, GI and , other than that noted in the HPI,  is " "negative    Objective:   /74   Pulse 72   Temp 97.1  F (36.2  C)   Resp 16   Ht 1.702 m (5' 7\")   Wt 76.9 kg (169 lb 9.6 oz)   LMP  (LMP Unknown)   SpO2 97%   BMI 26.56 kg/m    GENERAL APPEARANCE:  Alert, elderly, in no distress, sitting in WC  HEENT:  atraumatic, [place], EOM intact, moist mucus membranes  RESP:  non-labored breathing, lungs clear on auscultation, no respiratory distress, occasional cough  CV:  Rate regular, S1 S2 noted, R>L edema  ABDOMEN:  soft, non-distended, non-tender, bowel sounds active  M/S: moves all extremities, strength and tone equal bilaterally, no calf pain, arthritic changes noted in hands  SKIN:  warm, dry, thin, fragile, no obvious rash, lesions, ulcerations or petechiae   NEURO:   Face is symmetric, examination of sensation by touch normal, follows and tracks, slow speech  PSYCH:  calm, cooperative      Labs reviewed as per [facility] and/or Care Everywhere.      Assessment/Plan:  Closed fracture of neck of right femur with routine healing, subsequent encounter  Acute post-operative pain   S/p right hip nailing on 9/12; follow-up with Ortho in 3 weeks.  -WBAT  -ASA 81 mg twice daily as ordered for 3 months  -Continue postsurgical wound cares as ordered  -Continue oxycodone 5 mg every 6 hours as needed; wean as able  -continue APAP 1000 mg 3 times daily  -follow-up with orthopedic trauma clinic in [place] as scheduled on 10/2/24    Age-related osteoporosis with current pathological fracture with routine healing, subsequent encounter   BMP with no concerns, TSH 3.09, Vit D 50, and PTH 30 (9/24/24).  -continue calcium 1200 mg daily  -continue vitamin D 1000 unit daily  -start alendronate 70 mg weekly     Anemia due to blood loss, acute  In TCU, hemoglobin improved at 10.8, WBC increased at 12.6 (9/24/24). No signs of active bleeding. VSS.  -monitor bleeding risks  -recheck CBC on 9/27     Primary hypertension  Hypercholesterolemia  History of stroke  //81 mmHg. HR " 71-78 bpm.  -ASA as above  -Continue amlodipine 2.5 mg daily  -Continue metoprolol succinate 50 mg daily  -Continue rosuvastatin 10 mg at bedtime  -Follow BP and HR and adjust medications as needed    Irritable bowel syndrome with diarrhea  Per chart history. Seen by [facility] with concerns for possible collagenous colitis. Today, no concerns.   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 Feels mild depression today; declined intervention.   -monitor mood and behaviors  -consider ACP     Acute cough   Productive. Lungs are clear.  -order covid screen (resulted negative)  -follow clinically  -CBC as above     Fall, subsequent encounter   Physical deconditioning  Secondary to diagnoses above and recent hospitalization. In TCU for rehabilitation. Anticipate discharge next week.   -PT/OT following- adv per recommendations   -SW available for safe discharge planning ongoing    MED REC REQUIRED  Post Medication Reconciliation Status: discharge medications reconciled and changed, per note/orders          36 minutes spent on the date of this encounter doing chart review, review labs, discussion with nursing staff, patient visit, and documentation.       Electronically signed by: Dr. Juliet Andrade, DNP, APRN, AGNP-BC, PHN    This note was completed with the assistance of dictation software. Typos and word substitution-errors are expected and unintended.                          Sincerely,        Juliet Andrade, UMANG CNP

## 2024-09-30 ENCOUNTER — LAB REQUISITION (OUTPATIENT)
Dept: LAB | Facility: CLINIC | Age: 84
End: 2024-09-30
Payer: MEDICARE

## 2024-09-30 ENCOUNTER — DISCHARGE SUMMARY NURSING HOME (OUTPATIENT)
Dept: GERIATRICS | Facility: CLINIC | Age: 84
End: 2024-09-30
Payer: MEDICARE

## 2024-09-30 VITALS
WEIGHT: 169.6 LBS | TEMPERATURE: 97.5 F | OXYGEN SATURATION: 94 % | SYSTOLIC BLOOD PRESSURE: 131 MMHG | BODY MASS INDEX: 26.62 KG/M2 | HEIGHT: 67 IN | HEART RATE: 74 BPM | DIASTOLIC BLOOD PRESSURE: 75 MMHG | RESPIRATION RATE: 16 BRPM

## 2024-09-30 DIAGNOSIS — D64.9 ANEMIA, UNSPECIFIED: ICD-10-CM

## 2024-09-30 DIAGNOSIS — M80.00XD AGE-RELATED OSTEOPOROSIS WITH CURRENT PATHOLOGICAL FRACTURE WITH ROUTINE HEALING, SUBSEQUENT ENCOUNTER: ICD-10-CM

## 2024-09-30 DIAGNOSIS — Z86.73 HISTORY OF STROKE: ICD-10-CM

## 2024-09-30 DIAGNOSIS — I10 PRIMARY HYPERTENSION: ICD-10-CM

## 2024-09-30 DIAGNOSIS — R53.81 PHYSICAL DECONDITIONING: ICD-10-CM

## 2024-09-30 DIAGNOSIS — F41.1 GENERALIZED ANXIETY DISORDER: ICD-10-CM

## 2024-09-30 DIAGNOSIS — E78.00 HYPERCHOLESTEROLEMIA: ICD-10-CM

## 2024-09-30 DIAGNOSIS — G89.18 ACUTE POST-OPERATIVE PAIN: ICD-10-CM

## 2024-09-30 DIAGNOSIS — S72.001D CLOSED FRACTURE OF NECK OF RIGHT FEMUR WITH ROUTINE HEALING, SUBSEQUENT ENCOUNTER: Primary | ICD-10-CM

## 2024-09-30 DIAGNOSIS — K58.0 IRRITABLE BOWEL SYNDROME WITH DIARRHEA: ICD-10-CM
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
## 2024-09-30 DIAGNOSIS — F32.0 MAJOR DEPRESSIVE DISORDER, SINGLE EPISODE, MILD (H): ICD-10-CM

## 2024-09-30 DIAGNOSIS — D62 ANEMIA DUE TO BLOOD LOSS, ACUTE: ICD-10-CM

## 2024-09-30 PROCEDURE — 99316 NF DSCHRG MGMT 30 MIN+: CPT

## 2024-09-30 NOTE — PROGRESS NOTES
"[facility] GERIATRICS DISCHARGE SUMMARY  PATIENT'S NAME: Ellen Felipe  YOB: 1940  MEDICAL RECORD NUMBER:  [id number]  Place of Service where encounter took place:  [facility] () [[id number]]    PRIMARY CARE PROVIDER AND CLINIC RESPONSIBLE AFTER TRANSFER:   Brenda Godoy MD, [address] [facility] / [facility] [address]         Transferring providers: Juliet Andrade DNP, dEin Swann MD  Recent Hospitalization/ED:  [facility] stay 9/12/24 to 9/16/24.  Date of SNF Admission: September 16, 2024  Date of SNF (anticipated) Discharge: October 05, 2024  Discharged to: previous independent home  Cognitive Scores: Short blessed: 4/28  Physical Function: Ambulating 65 ft with walker  DME: No DME needed    CODE STATUS/ADVANCE DIRECTIVES DISCUSSION:  No Order   ALLERGIES: Iron dextran, Hydrochlorothiazide, Lactose, Sertraline, and Bactrim [sulfamethoxazole-trimethoprim]    NURSING FACILITY COURSE   Medication Changes/Rationale:   See below    Summary of nursing facility stay:   Ellen Felipe is a 84 year old (1940), with PMH: aortic stenosis s/p TAVR (June 2022), HTN, HLD, Hx of stroke. Presented to ED after falling and developed right-sided hip pain. X-ray of hip showed moderately displaced and angulated comminuted fracture of the proximal diaphysis/neck of the right femur with involvement of the lesser trochanter.  Underwent intramedullary nailing of right subtrochanteric femur on 9/12/2024. Postop acute blood loss anemia requiring PRBC transfusion with resolution.  Discharged to this facility for medical management, rehabilitation, and nursing care. New on oxycodone.    Today:  Nursing staff reports no concerns.    Patient is seen today for a visit in her room. She feels \"pretty good\". Denies pain; notes tylenol is effective. Therapies have been going \"good\"; reportedly walking and did the Nustep. Appetite is \"too good\". Bowels are moving \"once a day\". " "Endorses urinary frequency; denies pain or burning sensation. Writer discussed non-pharmacological interventions such as reducing consumption of common bladder irritants. Patient was receptive. Sleeping \"pretty good\" at night. Mood is \"pretty good\"; endorses history of depression but no concerns today. Denies CP, palpitations, lightheadedness, dizziness, fatigue, SOB, fever, chills, nausea/vomiting concerns today.    Assessment/Plan:  Closed fracture of neck of right femur with routine healing, subsequent encounter  Acute post-operative pain   S/p right hip nailing on 9/12; follow-up with Ortho in 3 weeks. Patient has not been using oxycodone. Pain is controlled with tylenol.   -WBAT  -ASA 81 mg twice daily as ordered for 3 months  -Continue postsurgical wound cares as ordered  -Discontinue oxycodone 5 mg every 6 hours as needed  -continue APAP 1000 mg 3 times daily  -follow-up with orthopedic trauma clinic in [place] as scheduled on 10/2/24     Age-related osteoporosis with current pathological fracture with routine healing, subsequent encounter   BMP with no concerns, TSH 3.09, Vit D 50, and PTH 30 (9/24/24); started on alendronate on 9/27/24.  -continue calcium 1200 mg daily  -continue vitamin D 1000 unit daily  -continue alendronate 70 mg weekly (every Friday)     Anemia due to blood loss, acute  In TCU, hemoglobin improved,11.1 (10/1). Missy cells noted on morphology; suspect related to uremia; BUN 24.9 (9/24). Rechecking BMP on 10/2.  -monitor bleeding risks  -follow clinically     Primary hypertension  Hypercholesterolemia  History of stroke  /77 to 152/86 mmHg. HR 74-80 bpm.  -ASA as above  -Continue amlodipine 2.5 mg daily  -Continue metoprolol succinate 50 mg daily  -Continue rosuvastatin 10 mg at bedtime  -Follow BP and HR and adjust medications as needed     Irritable bowel syndrome with diarrhea  Per chart history. Seen by [facility] with concerns for possible collagenous colitis. Today, no concerns. 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 No concerns today.  -monitor mood and behaviors  -consider ACP      Fall, subsequent encounter   Physical deconditioning  Secondary to diagnoses above and recent hospitalization. In TCU for rehabilitation.   -Plan to discharge with home health      Discharge Medications:  MED REC REQUIRED  Post Medication Reconciliation Status: discharge medications reconciled and changed, per note/orders       Current Outpatient Medications   Medication Sig Dispense Refill    acetaminophen (TYLENOL) 500 MG tablet Take 1,000 mg by mouth 3 times daily.      amLODIPine (NORVASC) 2.5 MG tablet TAKE 1 TABLET (2.5 MG) BY MOUTH DAILY 90 tablet 3    amoxicillin (AMOXIL) 500 MG capsule Take 2,000 mg by mouth See Admin Instructions      aspirin (ASA) 81 MG EC tablet Take 1 tablet (81 mg) by mouth daily (Patient taking differently: Take 81 mg by mouth 2 times daily. For 3 months per ortho)      calcium 600 MG tablet Take 2 tablets by mouth daily.      cholecalciferol (VITAMIN D3) 25 mcg (1000 units) capsule Take 1 capsule by mouth daily.      metoprolol succinate ER (TOPROL XL) 50 MG 24 hr tablet Take 50 mg by mouth daily.      Multiple Vitamin (MULTIVITAMIN PO) Take 1 chew tab by mouth daily.      rosuvastatin (CRESTOR) 10 MG tablet TAKE 1 TABLET (10 MG) BY MOUTH DAILY 90 tablet 1    vitamin B-Complex Take 1 tablet by mouth daily.            Controlled medications:   not applicable/none     Past Medical History:   Past Medical History:   Diagnosis Date    Aortic valve stenosis     TAVR 6/2022    Arthritis     Cerebral artery occlusion with cerebral infarction (H)     Depression     Essential hypertension     Created by Conversion  Replacement Utility updated for latest IMO load    Heart murmur     Hyperlipidemia     IBS (irritable bowel syndrome)     Iron  "deficiency anemia due to chronic blood loss 10/26/2022    OCD (obsessive compulsive disorder) 04/10/2015    OCD (obsessive compulsive disorder)      Physical Exam:   Vitals: /75   Pulse 74   Temp 97.5  F (36.4  C)   Resp 16   Ht 1.702 m (5' 7\")   Wt 76.9 kg (169 lb 9.6 oz)   LMP  (LMP Unknown)   SpO2 94%   BMI 26.56 kg/m    BMI: Body mass index is 26.56 kg/m .  GENERAL APPEARANCE:  Alert, elderly, in no distress  HEENT:  atraumatic, [place], EOM intact, moist mucus membranes  RESP:  non-labored breathing, moves good air, no respiratory distress, no cough  CV:  Rate regular, S1 S2 noted, mild L>R edema  ABDOMEN:  soft, non-distended, non-tender, bowel sounds active  M/S:  moves all extremities, strength and tone equal bilaterally, no calf pain, arthritic changes noted in hands  SKIN:  warm, dry, thin, fragile, no obvious rash, lesions, ulcerations or petechiae   NEURO:   Face is symmetric, examination of sensation by touch normal, follows and tracks, slow speech  PSYCH:  calm, cooperative       SNF labs: Labs reviewed as per [facility] and/or Care Everywhere.      DISCHARGE PLAN:  Follow up labs: BMP pending 10/2  Medical Follow Up:      Follow up with primary care provider in 5-7 days   Follow up with ortho as scheduled   [facility] scheduled appointments:  Appointments in Next Year      Oct 31, 2024 11:00 AM  (Arrive by 10:40 AM)  [facility]ual Wellness [facility]  [facility] [phone]          Discharge Services: Home Care:  Occupational Therapy, Physical Therapy, Registered Nurse, and Home Health Aide      TOTAL DISCHARGE TIME:   Greater than 30 minutes  Electronically signed by:  Dr. Juliet Andrade, DNP, APRN, AGNP-BC, PHN    This note was completed with the assistance of dictation software. Typos and word substitution-errors are expected and unintended.              "

## 2024-09-30 NOTE — LETTER
" 9/30/2024      Ellen Felipe  [address]  [place] [address]        [facility] GERIATRICS DISCHARGE SUMMARY  PATIENT'S NAME: Ellen Felipe  YOB: 1940  MEDICAL RECORD NUMBER:  [id number]  Place of Service where encounter took place:  [facility] ([facility]) [[id number]]    PRIMARY CARE PROVIDER AND CLINIC RESPONSIBLE AFTER TRANSFER:   Brenda Godoy MD, [address] / [facility] [place] [address]         Transferring providers: Juliet Andrade DNP, Edin Swann MD  Recent Hospitalization/ED:  [facility] stay 9/12/24 to 9/16/24.  Date of SNF Admission: September 16, 2024  Date of SNF (anticipated) Discharge: October 05, 2024  Discharged to: previous independent home  Cognitive Scores: Short blessed: 4/28  Physical Function: Ambulating 65 ft with walker  DME: No DME needed    CODE STATUS/ADVANCE DIRECTIVES DISCUSSION:  No Order   ALLERGIES: Iron dextran, Hydrochlorothiazide, Lactose, Sertraline, and Bactrim [sulfamethoxazole-trimethoprim]    NURSING FACILITY COURSE   Medication Changes/Rationale:   See below    Summary of nursing facility stay:   Ellen Felipe is a 84 year old (1940), with PMH: aortic stenosis s/p TAVR (June 2022), HTN, HLD, Hx of stroke. Presented to ED after falling and developed right-sided hip pain. X-ray of hip showed moderately displaced and angulated comminuted fracture of the proximal diaphysis/neck of the right femur with involvement of the lesser trochanter.  Underwent intramedullary nailing of right subtrochanteric femur on 9/12/2024. Postop acute blood loss anemia requiring PRBC transfusion with resolution.  Discharged to this facility for medical management, rehabilitation, and nursing care. New on oxycodone.    Today:  Nursing staff reports no concerns.    Patient is seen today for a visit in her room. She feels \"pretty good\". Denies pain; notes tylenol is effective. Therapies have been going \"good\"; reportedly " "walking and did the Nustep. Appetite is \"too good\". Bowels are moving \"once a day\". Endorses urinary frequency; denies pain or burning sensation. Writer discussed non-pharmacological interventions such as reducing consumption of common bladder irritants. Patient was receptive. Sleeping \"pretty good\" at night. Mood is \"pretty good\"; endorses history of depression but no concerns today. Denies CP, palpitations, lightheadedness, dizziness, fatigue, SOB, fever, chills, nausea/vomiting concerns today.    Assessment/Plan:  Closed fracture of neck of right femur with routine healing, subsequent encounter  Acute post-operative pain   S/p right hip nailing on 9/12; follow-up with Ortho in 3 weeks. Patient has not been using oxycodone. Pain is controlled with tylenol.   -WBAT  -ASA 81 mg twice daily as ordered for 3 months  -Continue postsurgical wound cares as ordered  -Discontinue oxycodone 5 mg every 6 hours as needed  -continue APAP 1000 mg 3 times daily  -follow-up with orthopedic trauma clinic in [place] as scheduled on 10/2/24     Age-related osteoporosis with current pathological fracture with routine healing, subsequent encounter   BMP with no concerns, TSH 3.09, Vit D 50, and PTH 30 (9/24/24); started on alendronate on 9/27/24.  -continue calcium 1200 mg daily  -continue vitamin D 1000 unit daily  -continue alendronate 70 mg weekly (every Friday)     Anemia due to blood loss, acute  In TCU, hemoglobin improved,11.1 (10/1). [place] cells noted on morphology; suspect related to uremia; BUN 24.9 (9/24). Rechecking BMP on 10/2.  -monitor bleeding risks  -follow clinically     Primary hypertension  Hypercholesterolemia  History of stroke  /77 to 152/86 mmHg. HR 74-80 bpm.  -ASA as above  -Continue amlodipine 2.5 mg daily  -Continue metoprolol succinate 50 mg daily  -Continue rosuvastatin 10 mg at bedtime  -Follow BP and HR and adjust medications as needed     Irritable bowel syndrome with diarrhea  Per chart " history. Seen by [facility] with concerns for possible collagenous colitis. Today, no concerns.   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 No concerns today.  -monitor mood and behaviors  -consider ACP      Fall, subsequent encounter   Physical deconditioning  Secondary to diagnoses above and recent hospitalization. In TCU for rehabilitation.   -Plan to discharge with home health      Discharge Medications:  MED REC REQUIRED  Post Medication Reconciliation Status: discharge medications reconciled and changed, per note/orders       Current Outpatient Medications   Medication Sig Dispense Refill     acetaminophen (TYLENOL) 500 MG tablet Take 1,000 mg by mouth 3 times daily.       amLODIPine (NORVASC) 2.5 MG tablet TAKE 1 TABLET (2.5 MG) BY MOUTH DAILY 90 tablet 3     amoxicillin (AMOXIL) 500 MG capsule Take 2,000 mg by mouth See Admin Instructions       aspirin (ASA) 81 MG EC tablet Take 1 tablet (81 mg) by mouth daily (Patient taking differently: Take 81 mg by mouth 2 times daily. For 3 months per ortho)       calcium 600 MG tablet Take 2 tablets by mouth daily.       cholecalciferol (VITAMIN D3) 25 mcg (1000 units) capsule Take 1 capsule by mouth daily.       metoprolol succinate ER (TOPROL XL) 50 MG 24 hr tablet Take 50 mg by mouth daily.       Multiple Vitamin (MULTIVITAMIN PO) Take 1 chew tab by mouth daily.       rosuvastatin (CRESTOR) 10 MG tablet TAKE 1 TABLET (10 MG) BY MOUTH DAILY 90 tablet 1     vitamin B-Complex Take 1 tablet by mouth daily.            Controlled medications:   not applicable/none     Past Medical History:   Past Medical History:   Diagnosis Date     Aortic valve stenosis     TAVR 6/2022     Arthritis      Cerebral artery occlusion with cerebral infarction (H)      Depression      Essential hypertension     Created by Conversion  Replacement Utility updated  "for latest IMO load     Heart murmur      Hyperlipidemia      IBS (irritable bowel syndrome)      Iron deficiency anemia due to chronic blood loss 10/26/2022     OCD (obsessive compulsive disorder) 04/10/2015     OCD (obsessive compulsive disorder)      Physical Exam:   Vitals: /75   Pulse 74   Temp 97.5  F (36.4  C)   Resp 16   Ht 1.702 m (5' 7\")   Wt 76.9 kg (169 lb 9.6 oz)   LMP  (LMP Unknown)   SpO2 94%   BMI 26.56 kg/m    BMI: Body mass index is 26.56 kg/m .  GENERAL APPEARANCE:  Alert, elderly, in no distress  HEENT:  atraumatic, [place], EOM intact, moist mucus membranes  RESP:  non-labored breathing, moves good air, no respiratory distress, no cough  CV:  Rate regular, S1 S2 noted, mild L>R edema  ABDOMEN:  soft, non-distended, non-tender, bowel sounds active  M/S:  moves all extremities, strength and tone equal bilaterally, no calf pain, arthritic changes noted in hands  SKIN:  warm, dry, thin, fragile, no obvious rash, lesions, ulcerations or petechiae   NEURO:   Face is symmetric, examination of sensation by touch normal, follows and tracks, slow speech  PSYCH:  calm, cooperative       SNF labs: Labs reviewed as per [facility] and/or Care Everywhere.      DISCHARGE PLAN:  Follow up labs: BMP pending 10/2  Medical Follow Up:      Follow up with primary care provider in 5-7 days   Follow up with ortho as scheduled   [facility] scheduled appointments:  Appointments in Next Year      Oct 31, 2024 11:00 AM  (Arrive by 10:40 AM)  Annual Wellness Visit with Brenda Godoy MD  [facility] ([facility]  ) [phone]          Discharge Services: Home Care:  Occupational Therapy, Physical Therapy, Registered Nurse, and Home Health Aide      TOTAL DISCHARGE TIME:   Greater than 30 minutes  Electronically signed by:  Dr. Juliet Andrade, DNP, APRN, AGNP-BC, PHN    This note was completed with the assistance of dictation software. Typos and word " substitution-errors are expected and unintended.                Sincerely,        Juliet Andrade, APRN CNP

## 2024-10-01 ENCOUNTER — TELEPHONE (OUTPATIENT)
Dept: GERIATRICS | Facility: CLINIC | Age: 84
End: 2024-10-01

## 2024-10-01 ENCOUNTER — LAB REQUISITION (OUTPATIENT)
Dept: LAB | Facility: CLINIC | Age: 84
End: 2024-10-01
Payer: MEDICARE

## 2024-10-01 DIAGNOSIS — I10 ESSENTIAL (PRIMARY) HYPERTENSION: ICD-10-CM

## 2024-10-01 DIAGNOSIS — M62.81 MUSCLE WEAKNESS (GENERALIZED): ICD-10-CM

## 2024-10-01 LAB
BURR CELLS BLD QL SMEAR: ABNORMAL
ERYTHROCYTE [DISTWIDTH] IN BLOOD BY AUTOMATED COUNT: 16.4 % (ref 10–15)
HCT VFR BLD AUTO: 36.2 % (ref 35–47)
HGB BLD-MCNC: 11.1 G/DL (ref 11.7–15.7)
MCH RBC QN AUTO: 28.3 PG (ref 26.5–33)
MCHC RBC AUTO-ENTMCNC: 30.7 G/DL (ref 31.5–36.5)
MCV RBC AUTO: 92 FL (ref 78–100)
PLAT MORPH BLD: ABNORMAL
PLATELET # BLD AUTO: 408 10E3/UL (ref 150–450)
RBC # BLD AUTO: 3.92 10E6/UL (ref 3.8–5.2)
RBC MORPH BLD: ABNORMAL
WBC # BLD AUTO: 8.2 10E3/UL (ref 4–11)

## 2024-10-01 PROCEDURE — 85027 COMPLETE CBC AUTOMATED: CPT

## 2024-10-01 PROCEDURE — P9604 ONE-WAY ALLOW PRORATED TRIP: HCPCS

## 2024-10-01 PROCEDURE — 36415 COLL VENOUS BLD VENIPUNCTURE: CPT

## 2024-10-01 NOTE — TELEPHONE ENCOUNTER
[facility] Geriatrics Lab Note     Provider: UMANG Rudolph DNP  Facility: [facility]  Facility Type:  TCU    Allergies   Allergen Reactions    Iron Dextran Swelling    Hydrochlorothiazide Other (See Comments)     Hyponatremia, hypokalemia    Lactose Diarrhea    Sertraline Diarrhea    Bactrim [Sulfamethoxazole-Trimethoprim] Hives and Rash       Labs Reviewed by provider: Heme 1     Verbal Order/Direction given by Provider: Check BMP on 10/2/24.      Provider giving Order:  UMANG Rudolph DNP    Verbal Order given to: Negar Mendez RN

## 2024-10-02 ENCOUNTER — TELEPHONE (OUTPATIENT)
Dept: GERIATRICS | Facility: CLINIC | Age: 84
End: 2024-10-02

## 2024-10-02 LAB
ANION GAP SERPL CALCULATED.3IONS-SCNC: 10 MMOL/L (ref 7–15)
BUN SERPL-MCNC: 23.5 MG/DL (ref 8–23)
CALCIUM SERPL-MCNC: 9.1 MG/DL (ref 8.8–10.4)
CHLORIDE SERPL-SCNC: 107 MMOL/L (ref 98–107)
CREAT SERPL-MCNC: 0.79 MG/DL (ref 0.51–0.95)
EGFRCR SERPLBLD CKD-EPI 2021: 74 ML/MIN/1.73M2
GLUCOSE SERPL-MCNC: 91 MG/DL (ref 70–99)
HCO3 SERPL-SCNC: 22 MMOL/L (ref 22–29)
POTASSIUM SERPL-SCNC: 4 MMOL/L (ref 3.4–5.3)
SODIUM SERPL-SCNC: 139 MMOL/L (ref 135–145)

## 2024-10-02 PROCEDURE — P9604 ONE-WAY ALLOW PRORATED TRIP: HCPCS | Performed by: FAMILY MEDICINE

## 2024-10-02 PROCEDURE — 80048 BASIC METABOLIC PNL TOTAL CA: CPT | Performed by: FAMILY MEDICINE

## 2024-10-02 PROCEDURE — 82947 ASSAY GLUCOSE BLOOD QUANT: CPT | Performed by: FAMILY MEDICINE

## 2024-10-02 PROCEDURE — 36415 COLL VENOUS BLD VENIPUNCTURE: CPT | Performed by: FAMILY MEDICINE

## 2024-10-02 NOTE — TELEPHONE ENCOUNTER
[facility] Geriatrics Lab Note     Provider: UMANG Rudolph DNP  Facility: [facility]  Facility Type:  TCU    Allergies   Allergen Reactions    Iron Dextran Swelling    Hydrochlorothiazide Other (See Comments)     Hyponatremia, hypokalemia    Lactose Diarrhea    Sertraline Diarrhea    Bactrim [Sulfamethoxazole-Trimethoprim] Hives and Rash       Labs Reviewed by provider: SHARRON     Verbal Order/Direction given by Provider: NNO. Encourage adequate hydration. Follow-up with PCP post TCU.     Provider giving Order:  UMANG Rudolph DNP    Verbal Order given to: Sarah Hernandes RN

## 2024-10-07 ENCOUNTER — TELEPHONE (OUTPATIENT)
Dept: INTERNAL MEDICINE | Facility: CLINIC | Age: 84
End: 2024-10-07
Payer: MEDICARE

## 2024-10-07 ENCOUNTER — MEDICAL CORRESPONDENCE (OUTPATIENT)
Dept: HEALTH INFORMATION MANAGEMENT | Facility: CLINIC | Age: 84
End: 2024-10-07
Payer: MEDICARE

## 2024-10-07 ENCOUNTER — TRANSFERRED RECORDS (OUTPATIENT)
Dept: HEALTH INFORMATION MANAGEMENT | Facility: CLINIC | Age: 84
End: 2024-10-07
Payer: MEDICARE

## 2024-10-07 NOTE — TELEPHONE ENCOUNTER
October 7, 2024    [facility] Home Order Summary Report Medications was received via fax for Dr. Godoy.  Patient label was attached to paperwork and placed in provider's inbox to be signed.    Shellie Syed

## 2024-10-08 ENCOUNTER — PATIENT OUTREACH (OUTPATIENT)
Dept: CARE COORDINATION | Facility: CLINIC | Age: 84
End: 2024-10-08
Payer: MEDICARE

## 2024-10-08 ENCOUNTER — TELEPHONE (OUTPATIENT)
Dept: INTERNAL MEDICINE | Facility: CLINIC | Age: 84
End: 2024-10-08
Payer: MEDICARE

## 2024-10-08 NOTE — LETTER
[facility] CARE COORDINATION  [facility]    October 10, 2024    Ellen KRYSTAL Felipe  [address]   [place] [address]      Dear Meghan,    I am a clinic care coordinator who works with Brenda Godoy MD with the [facility]. I wanted to introduce myself and provide you with my contact information for you to be able to call me with any questions or concerns. Below is a description of clinic care coordination and how I can further assist you.       The clinic care coordination team is made up of a registered nurse, , financial resource worker and community health worker who understand the health care system. The goal of clinic care coordination is to help you manage your health and improve access to the health care system. Our team works alongside your provider to assist you in determining your health and social needs. We can help you obtain health care and community resources, providing you with necessary information and education. We can work with you through any barriers and develop a care plan that helps coordinate and strengthen the communication between you and your care team.  Our services are voluntary and are offered without charge to you personally.    Please feel free to contact me with any questions or concerns regarding care coordination and what we can offer.      We are focused on providing you with the highest-quality healthcare experience possible.    Sincerely,     Mara Frank,   [facility]  [phone]

## 2024-10-08 NOTE — TELEPHONE ENCOUNTER
October 8, 2024    Home health orders was received via fax for Dr. Godoy.  Patient label was attached to paperwork and placed in provider's inbox to be signed.    Shellie Syed

## 2024-10-08 NOTE — PROGRESS NOTES
UT/Voicemail    Clinical Data: Care Coordinator Outreach    Outreach Documentation Number of Outreach Attempt   10/8/2024  10:53 AM 1   10/10/2024   9:12 AM 2       Left message on patient's voicemail with call back information and requested return call.    Plan: Care Coordinator sent care coordination introduction letter on 10- via [employer]. Care Coordinator will do no further outreaches at this time.    Social Frieda Lund  [facility]  [phone]    [facility]/Voicemail    Clinical Data: Care Coordinator Outreach    Outreach Documentation Number of Outreach Attempt   10/8/2024  10:53 AM 1       Left message on patient's voicemail with call back information and requested return call. Discharged from TCU to home with home care.     Plan:Care Coordinator will try to reach patient again in 3-5 business days.    Social Frieda Lund  [facility]  [phone]

## 2024-10-09 NOTE — TELEPHONE ENCOUNTER
October 9, 2024    [facility] Home Order Summary - Medications was picked up from outbox of Dr. Godoy and sent via fax to [phone].    Shellie Syed

## 2024-10-10 ENCOUNTER — TELEPHONE (OUTPATIENT)
Dept: INTERNAL MEDICINE | Facility: CLINIC | Age: 84
End: 2024-10-10

## 2024-10-10 ENCOUNTER — OFFICE VISIT (OUTPATIENT)
Dept: INTERNAL MEDICINE | Facility: CLINIC | Age: 84
End: 2024-10-10
Payer: MEDICARE

## 2024-10-10 VITALS
WEIGHT: 169.7 LBS | SYSTOLIC BLOOD PRESSURE: 111 MMHG | TEMPERATURE: 97.7 F | HEIGHT: 67 IN | RESPIRATION RATE: 16 BRPM | BODY MASS INDEX: 26.63 KG/M2 | OXYGEN SATURATION: 97 % | DIASTOLIC BLOOD PRESSURE: 70 MMHG | HEART RATE: 74 BPM

## 2024-10-10 DIAGNOSIS — D50.9 IRON DEFICIENCY ANEMIA, UNSPECIFIED IRON DEFICIENCY ANEMIA TYPE: ICD-10-CM

## 2024-10-10 DIAGNOSIS — I10 PRIMARY HYPERTENSION: ICD-10-CM

## 2024-10-10 DIAGNOSIS — M80.00XD AGE-RELATED OSTEOPOROSIS WITH CURRENT PATHOLOGICAL FRACTURE WITH ROUTINE HEALING, SUBSEQUENT ENCOUNTER: ICD-10-CM

## 2024-10-10 DIAGNOSIS — S72.001S CLOSED FRACTURE OF RIGHT HIP, SEQUELA: Primary | ICD-10-CM

## 2024-10-10 LAB
BASOPHILS # BLD AUTO: 0 10E3/UL (ref 0–0.2)
BASOPHILS NFR BLD AUTO: 0 %
EOSINOPHIL # BLD AUTO: 0.1 10E3/UL (ref 0–0.7)
EOSINOPHIL NFR BLD AUTO: 1 %
ERYTHROCYTE [DISTWIDTH] IN BLOOD BY AUTOMATED COUNT: 16.4 % (ref 10–15)
HCT VFR BLD AUTO: 34.2 % (ref 35–47)
HGB BLD-MCNC: 10.7 G/DL (ref 11.7–15.7)
IMM GRANULOCYTES # BLD: 0 10E3/UL
IMM GRANULOCYTES NFR BLD: 0 %
LYMPHOCYTES # BLD AUTO: 1.8 10E3/UL (ref 0.8–5.3)
LYMPHOCYTES NFR BLD AUTO: 26 %
MCH RBC QN AUTO: 29.2 PG (ref 26.5–33)
MCHC RBC AUTO-ENTMCNC: 31.3 G/DL (ref 31.5–36.5)
MCV RBC AUTO: 93 FL (ref 78–100)
MONOCYTES # BLD AUTO: 0.7 10E3/UL (ref 0–1.3)
MONOCYTES NFR BLD AUTO: 10 %
NEUTROPHILS # BLD AUTO: 4.3 10E3/UL (ref 1.6–8.3)
NEUTROPHILS NFR BLD AUTO: 62 %
PLATELET # BLD AUTO: 260 10E3/UL (ref 150–450)
RBC # BLD AUTO: 3.67 10E6/UL (ref 3.8–5.2)
WBC # BLD AUTO: 6.9 10E3/UL (ref 4–11)

## 2024-10-10 PROCEDURE — 99495 TRANSJ CARE MGMT MOD F2F 14D: CPT | Performed by: INTERNAL MEDICINE

## 2024-10-10 PROCEDURE — 36415 COLL VENOUS BLD VENIPUNCTURE: CPT | Performed by: INTERNAL MEDICINE

## 2024-10-10 PROCEDURE — 85025 COMPLETE CBC W/AUTO DIFF WBC: CPT | Performed by: INTERNAL MEDICINE

## 2024-10-10 RX ORDER — ALENDRONATE SODIUM 70 MG/1
70 TABLET ORAL
Status: SHIPPED
Start: 2024-10-10 | End: 2024-10-31

## 2024-10-10 ASSESSMENT — PAIN SCALES - GENERAL: PAINLEVEL: MODERATE PAIN (4)

## 2024-10-10 NOTE — PATIENT INSTRUCTIONS
Tale Aspirin 81 mg twice a day with food for another 2 months to prevent blood clots.    2. Will check hemoglobin level today.

## 2024-10-10 NOTE — PROGRESS NOTES
"  Assessment & Plan     Closed fracture of right hip, sequela  Due to mechanical fall, status post fixation on September 12.  Discussed to continue on aspirin 81 mg twice a day for DVT prophylaxis for 3 months postoperatively.  After that she can switch to once a day administration due to history of previous TAVR.    Age-related osteoporosis with current pathological fracture with routine healing, subsequent encounter  DEXA scan 2 years ago showed osteopenia which was monitored.  Currently due to fragility fractures she does qualify for diagnosis of osteoporosis, she is currently on calcium and vitamin D supplements.  She started Fosamax and has been tolerating it well.  In the future will need to get a baseline DEXA scan for this year.  - alendronate (FOSAMAX) 70 MG tablet; Take 1 tablet (70 mg) by mouth every 7 days.    Primary hypertension  Well-controlled, she denies dizziness she is on amlodipine.    Iron deficiency anemia, unspecified iron deficiency anemia type  Postoperatively patient had worsening anemia and needed a transfusion, currently she is on aspirin twice a day but denies any dizziness shortness of breath or weakness, will check hemoglobin to make sure her hemoglobin is stable  - CBC with platelets and differential          MED REC REQUIRED  Post Medication Reconciliation Status: Medications reviewed and reconciled  BMI  Estimated body mass index is 26.58 kg/m  as calculated from the following:    Height as of this encounter: 1.702 m (5' 7\").    Weight as of this encounter: 77 kg (169 lb 11.2 oz).       Subjective   Meghan is a 84 year old, presenting for the following health issues:  Hospital F/U and Recheck Medication (Pt reports that she is here to follow up from post op of hip surgery of right side on 10/04/24.)      10/10/2024     1:45 PM   Additional Questions   Roomed by ryan   Accompanied by          10/10/2024     1:45 PM   Patient Reported Additional Medications   Patient reports " taking the following new medications none     HPI       Hospital Follow-up Visit:    Hospital/Nursing Home/IP Rehab Facility:  [facility]  Date of Admission: 9/16/24  Date of Discharge: 10/05/24  Reason(s) for Admission: rehab rt hip  Was the patient in the ICU or did the patient experience delirium during hospitalization?  No  Do you have any other stressors you would like to discuss with your provider? No    Problems taking medications regularly:  None  Medication changes since discharge: Yes additional Fosamax and aspirin 81 mg twice a day  Problems adhering to non-medication therapy:  None    Summary of hospitalization:  [facility] discharge summary reviewed  Diagnostic Tests/Treatments reviewed.  Follow up needed: Orthopedist  Other Healthcare Providers Involved in Patient s Care:         Homecare  Update since discharge: improved.         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     Unfortunately since I last saw her, lays fell and broke her right hip.  That happened on September 12, she had surgery the same day, postoperatively due to worsening anemia she was transfused with 1 unit of blood and was discharged to TCU on oxycodone and aspirin 81 mg twice a day for 3 months for DVT prophylaxis.  It TCU Fosamax was also ordered due to fragility fractures/osteoporosis.    Currently lives reports that her pain is well-controlled, 3 out of 10 and she takes Tylenol only as needed, she continues to use a walker and has home PT OT.  Her  has been driving her around.  She denies any  "worsening of her depression.  She is up-to-date on flu and COVID shot.  She denies any dizziness.    The reason why she fell was because she was rushing and tripped.    Currently denies any constipation no urinary symptoms.    Review of systems: As above      Objective    /70 (BP Location: Left arm, Patient Position: Sitting, Cuff Size: Adult Large)   Pulse 74   Temp 97.7  F (36.5  C) (Axillary)   Resp 16   Ht 1.702 m (5' 7\")   Wt 77 kg (169 lb 11.2 oz)   LMP  (LMP Unknown)   SpO2 97%   Breastfeeding No   BMI 26.58 kg/m    Body mass index is 26.58 kg/m .  Physical Exam   General: well appearing female, alert and oriented x3  EYES: Eyelids, conjunctiva, and sclera were normal. Pupils were normal.   HEAD, EARS, NOSE, MOUTH, AND THROAT: no cervical LAD, no thyromegaly or nodules appreciated. TMs are visualized and normal, oropharynx is clear.  RESPIRATORY: respirations non labored, CTA bl, no wheezes, rales, no forced expiratory wheezing.  CARDIOVASCULAR: Heart rate and rhythm were normal. No murmurs, rubs,gallops. There was no peripheral edema. No carotid bruits.  GASTROINTESTINAL: Positive bowel sounds, abdomen is soft, non tender, non distended.     MUSCULOSKELETAL: Muscle mass was normal for age. No joint synovitis or deformity.  LYMPHATIC: There were no enlarged nodes palpable.  SKIN/HAIR/NAILS: Skin color was normal.  No rashes.  NEUROLOGIC: The patient was alert and oriented.  Speech was normal.  There is no facial asymmetry.   PSYCHIATRIC:  Mood and affect were normal.            Signed Electronically by: Brenda Godoy MD    "

## 2024-10-10 NOTE — TELEPHONE ENCOUNTER
October 10, 2024    Home health orders was picked up from outbox of Dr. Godoy and sent via fax to [phone].    Shellie Syed

## 2024-10-11 NOTE — TELEPHONE ENCOUNTER
Spoke with patient to relay PCP's recommendation. Patient verbalized understanding and had no further questions at the time of the call.

## 2024-10-11 NOTE — TELEPHONE ENCOUNTER
Please lt pt know,  Anemia is stable, but she would benefit from taking iron supplement to help improve hemoglobin levels.  I would recommend Feosol befera 28 mg supplement 1 tab a day for the next 2 months.     Dr MARQUIS

## 2024-10-14 ENCOUNTER — TELEPHONE (OUTPATIENT)
Dept: INTERNAL MEDICINE | Facility: CLINIC | Age: 84
End: 2024-10-14
Payer: MEDICARE

## 2024-10-15 ENCOUNTER — TELEPHONE (OUTPATIENT)
Dept: INTERNAL MEDICINE | Facility: CLINIC | Age: 84
End: 2024-10-15
Payer: MEDICARE

## 2024-10-15 NOTE — TELEPHONE ENCOUNTER
October 15, 2024    [facility] PT Eval completed 10/10/2024 was received via fax for Dr. Godoy.  Patient label was attached to paperwork and placed in provider's inbox to be signed.    Shellie Syed

## 2024-10-15 NOTE — TELEPHONE ENCOUNTER
October 14, 2024    [facility] OT Plan of Care  was received via fax for Dr. Godoy.  Patient label was attached to paperwork and placed in provider's inbox to be signed.    Shellie Syed    
October 15, 2024    [facility] OT Plan of Care was picked up from outbox of Dr. Godoy and sent via fax to [phone].    Shellie Syed    
No

## 2024-10-16 ENCOUNTER — MEDICAL CORRESPONDENCE (OUTPATIENT)
Dept: HEALTH INFORMATION MANAGEMENT | Facility: CLINIC | Age: 84
End: 2024-10-16
Payer: MEDICARE

## 2024-10-18 NOTE — TELEPHONE ENCOUNTER
October 18, 2024    [facility] PT Eval completed 10/10/2024  was picked up from outbox of Dr. Godoy and sent via fax to [phone].    Delmar Suarez

## 2024-10-30 ENCOUNTER — TELEPHONE (OUTPATIENT)
Dept: INTERNAL MEDICINE | Facility: CLINIC | Age: 84
End: 2024-10-30
Payer: MEDICARE

## 2024-10-30 NOTE — TELEPHONE ENCOUNTER
October 30, 2024    Home health orders was received via fax for Dr. Godoy.  Patient label was attached to paperwork and placed in provider's inbox to be signed.    Shellie Syed

## 2024-10-31 ENCOUNTER — OFFICE VISIT (OUTPATIENT)
Dept: INTERNAL MEDICINE | Facility: CLINIC | Age: 84
End: 2024-10-31
Payer: COMMERCIAL

## 2024-10-31 VITALS
OXYGEN SATURATION: 98 % | RESPIRATION RATE: 15 BRPM | TEMPERATURE: 100.3 F | WEIGHT: 165.2 LBS | BODY MASS INDEX: 25.93 KG/M2 | HEIGHT: 67 IN | DIASTOLIC BLOOD PRESSURE: 68 MMHG | HEART RATE: 72 BPM | SYSTOLIC BLOOD PRESSURE: 106 MMHG

## 2024-10-31 DIAGNOSIS — I10 PRIMARY HYPERTENSION: ICD-10-CM

## 2024-10-31 DIAGNOSIS — M80.00XS AGE-RELATED OSTEOPOROSIS WITH CURRENT PATHOLOGICAL FRACTURE, SEQUELA: ICD-10-CM

## 2024-10-31 DIAGNOSIS — E78.00 HYPERCHOLESTEROLEMIA: ICD-10-CM

## 2024-10-31 DIAGNOSIS — Z95.2 S/P TAVR (TRANSCATHETER AORTIC VALVE REPLACEMENT): ICD-10-CM

## 2024-10-31 DIAGNOSIS — R19.7 DIARRHEA, UNSPECIFIED TYPE: ICD-10-CM

## 2024-10-31 DIAGNOSIS — Z00.00 ENCOUNTER FOR ANNUAL WELLNESS EXAM IN MEDICARE PATIENT: Primary | ICD-10-CM

## 2024-10-31 DIAGNOSIS — K58.0 IRRITABLE BOWEL SYNDROME WITH DIARRHEA: ICD-10-CM

## 2024-10-31 DIAGNOSIS — I63.9 CEREBROVASCULAR ACCIDENT (CVA), UNSPECIFIED MECHANISM (H): ICD-10-CM

## 2024-10-31 DIAGNOSIS — E55.9 VITAMIN D DEFICIENCY: ICD-10-CM

## 2024-10-31 DIAGNOSIS — F32.5 MAJOR DEPRESSIVE DISORDER, SINGLE EPISODE IN FULL REMISSION (H): ICD-10-CM

## 2024-10-31 LAB
BASOPHILS # BLD AUTO: 0 10E3/UL (ref 0–0.2)
BASOPHILS NFR BLD AUTO: 1 %
EOSINOPHIL # BLD AUTO: 0.2 10E3/UL (ref 0–0.7)
EOSINOPHIL NFR BLD AUTO: 2 %
ERYTHROCYTE [DISTWIDTH] IN BLOOD BY AUTOMATED COUNT: 15.7 % (ref 10–15)
HCT VFR BLD AUTO: 36.9 % (ref 35–47)
HGB BLD-MCNC: 11.3 G/DL (ref 11.7–15.7)
IMM GRANULOCYTES # BLD: 0 10E3/UL
IMM GRANULOCYTES NFR BLD: 0 %
LYMPHOCYTES # BLD AUTO: 1.3 10E3/UL (ref 0.8–5.3)
LYMPHOCYTES NFR BLD AUTO: 16 %
MCH RBC QN AUTO: 28.1 PG (ref 26.5–33)
MCHC RBC AUTO-ENTMCNC: 30.6 G/DL (ref 31.5–36.5)
MCV RBC AUTO: 92 FL (ref 78–100)
MONOCYTES # BLD AUTO: 0.8 10E3/UL (ref 0–1.3)
MONOCYTES NFR BLD AUTO: 10 %
NEUTROPHILS # BLD AUTO: 5.6 10E3/UL (ref 1.6–8.3)
NEUTROPHILS NFR BLD AUTO: 71 %
PLATELET # BLD AUTO: 243 10E3/UL (ref 150–450)
RBC # BLD AUTO: 4.02 10E6/UL (ref 3.8–5.2)
WBC # BLD AUTO: 7.9 10E3/UL (ref 4–11)

## 2024-10-31 PROCEDURE — 80053 COMPREHEN METABOLIC PANEL: CPT | Performed by: INTERNAL MEDICINE

## 2024-10-31 PROCEDURE — 85025 COMPLETE CBC W/AUTO DIFF WBC: CPT | Performed by: INTERNAL MEDICINE

## 2024-10-31 PROCEDURE — 99214 OFFICE O/P EST MOD 30 MIN: CPT | Mod: 25 | Performed by: INTERNAL MEDICINE

## 2024-10-31 PROCEDURE — 36415 COLL VENOUS BLD VENIPUNCTURE: CPT | Performed by: INTERNAL MEDICINE

## 2024-10-31 PROCEDURE — 80061 LIPID PANEL: CPT | Performed by: INTERNAL MEDICINE

## 2024-10-31 PROCEDURE — 84443 ASSAY THYROID STIM HORMONE: CPT | Performed by: INTERNAL MEDICINE

## 2024-10-31 PROCEDURE — G0439 PPPS, SUBSEQ VISIT: HCPCS | Performed by: INTERNAL MEDICINE

## 2024-10-31 PROCEDURE — 82306 VITAMIN D 25 HYDROXY: CPT | Performed by: INTERNAL MEDICINE

## 2024-10-31 RX ORDER — METOPROLOL SUCCINATE 50 MG/1
50 TABLET, EXTENDED RELEASE ORAL DAILY
Qty: 90 TABLET | Refills: 3 | Status: SHIPPED | OUTPATIENT
Start: 2024-10-31

## 2024-10-31 RX ORDER — AMLODIPINE BESYLATE 2.5 MG/1
2.5 TABLET ORAL DAILY
Qty: 90 TABLET | Refills: 3 | Status: SHIPPED | OUTPATIENT
Start: 2024-10-31

## 2024-10-31 RX ORDER — ALENDRONATE SODIUM 70 MG/1
70 TABLET ORAL
Qty: 12 TABLET | Refills: 3 | Status: SHIPPED | OUTPATIENT
Start: 2024-10-31

## 2024-10-31 RX ORDER — ROSUVASTATIN CALCIUM 10 MG/1
10 TABLET, COATED ORAL DAILY
Qty: 90 TABLET | Refills: 1 | Status: SHIPPED | OUTPATIENT
Start: 2024-10-31

## 2024-10-31 SDOH — HEALTH STABILITY: PHYSICAL HEALTH: ON AVERAGE, HOW MANY MINUTES DO YOU ENGAGE IN EXERCISE AT THIS LEVEL?: 20 MIN

## 2024-10-31 SDOH — HEALTH STABILITY: PHYSICAL HEALTH: ON AVERAGE, HOW MANY DAYS PER WEEK DO YOU ENGAGE IN MODERATE TO STRENUOUS EXERCISE (LIKE A BRISK WALK)?: 2 DAYS

## 2024-10-31 ASSESSMENT — PATIENT HEALTH QUESTIONNAIRE - PHQ9
10. IF YOU CHECKED OFF ANY PROBLEMS, HOW DIFFICULT HAVE THESE PROBLEMS MADE IT FOR YOU TO DO YOUR WORK, TAKE CARE OF THINGS AT HOME, OR GET ALONG WITH OTHER PEOPLE: NOT DIFFICULT AT ALL
SUM OF ALL RESPONSES TO PHQ QUESTIONS 1-9: 2
SUM OF ALL RESPONSES TO PHQ QUESTIONS 1-9: 2

## 2024-10-31 ASSESSMENT — SOCIAL DETERMINANTS OF HEALTH (SDOH): HOW OFTEN DO YOU GET TOGETHER WITH FRIENDS OR RELATIVES?: TWICE A WEEK

## 2024-10-31 NOTE — LETTER
November 3, 2024      Meghan N Matteo  [address]   [place] [address]        Dear ,    We are writing to inform you of your test results.    Meghan,  Kidney,liver, thyroid functions, sugar and vitamin D levels are good.    Anemia is improving.  Cholesterol level is excellent!    Dr BENITEZ Castro Orders   Lipid panel reflex to direct LDL Non-fasting   Result Value Ref Range    Cholesterol 132 <200 mg/dL    Triglycerides 95 <150 mg/dL    Direct Measure HDL 54 >=50 mg/dL    LDL Cholesterol Calculated 59 <100 mg/dL    Non HDL Cholesterol 78 <130 mg/dL    Patient Fasting > 8hrs? No     Narrative    Cholesterol  Desirable: < 200 mg/dL  Borderline High: 200 - 239 mg/dL  High: >= 240 mg/dL    Triglycerides  Normal: < 150 mg/dL  Borderline High: 150 - 199 mg/dL  High: 200-499 mg/dL  Very High: >= 500 mg/dL    Direct Measure HDL  Female: >= 50 mg/dL   Male: >= 40 mg/dL    LDL Cholesterol  Desirable: < 100 mg/dL  Above Desirable: 100 - 129 mg/dL   Borderline High: 130 - 159 mg/dL   High:  160 - 189 mg/dL   Very High: >= 190 mg/dL    Non HDL Cholesterol  Desirable: < 130 mg/dL  Above Desirable: 130 - 159 mg/dL  Borderline High: 160 - 189 mg/dL  High: 190 - 219 mg/dL  Very High: >= 220 mg/dL   Comprehensive metabolic panel   Result Value Ref Range    Sodium 138 135 - 145 mmol/L    Potassium 4.7 3.4 - 5.3 mmol/L    Carbon Dioxide (CO2) 21 (L) 22 - 29 mmol/L    Anion Gap 10 7 - 15 mmol/L    Urea Nitrogen 25.2 (H) 8.0 - 23.0 mg/dL    Creatinine 0.98 (H) 0.51 - 0.95 mg/dL    GFR Estimate 57 (L) >60 mL/min/1.73m2      Comment:      eGFR calculated using 2021 CKD-EPI equation.    Calcium 9.6 8.8 - 10.4 mg/dL      Comment:      Reference intervals for this test were updated on 7/16/2024 to reflect our healthy population more accurately. There may be differences in the flagging of prior results with similar values performed with this method. Those prior results can be interpreted in the context of the updated reference  intervals.    Chloride 107 98 - 107 mmol/L    Glucose 88 70 - 99 mg/dL    Alkaline Phosphatase 131 40 - 150 U/L    AST 27 0 - 45 U/L    ALT 19 0 - 50 U/L    Protein Total 7.2 6.4 - 8.3 g/dL    Albumin 4.3 3.5 - 5.2 g/dL    Bilirubin Total 0.3 <=1.2 mg/dL    Patient Fasting > 8hrs? No    TSH with free T4 reflex   Result Value Ref Range    TSH 2.67 0.30 - 4.20 uIU/mL   Vitamin D Deficiency   Result Value Ref Range    Vitamin D, Total (25-Hydroxy) 48 20 - 50 ng/mL      Comment:      optimum levels    Narrative    Season, race, dietary intake, and treatment affect the concentration of 25-hydroxy-Vitamin D. Values may decrease during winter months and increase during summer months.    Vitamin D determination is routinely performed by an immunoassay specific for 25 hydroxyvitamin D3.  If an individual is on vitamin D2(ergocalciferol) supplementation, please specify 25 OH vitamin D2 and D3 level determination by LCMSMS test VITD23.     CBC with platelets and differential   Result Value Ref Range    WBC Count 7.9 4.0 - 11.0 10e3/uL    RBC Count 4.02 3.80 - 5.20 10e6/uL    Hemoglobin 11.3 (L) 11.7 - 15.7 g/dL    Hematocrit 36.9 35.0 - 47.0 %    MCV 92 78 - 100 fL    MCH 28.1 26.5 - 33.0 pg    MCHC 30.6 (L) 31.5 - 36.5 g/dL    RDW 15.7 (H) 10.0 - 15.0 %    Platelet Count 243 150 - 450 10e3/uL    % Neutrophils 71 %    % Lymphocytes 16 %    % Monocytes 10 %    % Eosinophils 2 %    % Basophils 1 %    % Immature Granulocytes 0 %    Absolute Neutrophils 5.6 1.6 - 8.3 10e3/uL    Absolute Lymphocytes 1.3 0.8 - 5.3 10e3/uL    Absolute Monocytes 0.8 0.0 - 1.3 10e3/uL    Absolute Eosinophils 0.2 0.0 - 0.7 10e3/uL    Absolute Basophils 0.0 0.0 - 0.2 10e3/uL    Absolute Immature Granulocytes 0.0 <=0.4 10e3/uL       If you have any questions or concerns, please call the clinic at the number listed above.       Sincerely,      Brenda Godoy MD

## 2024-10-31 NOTE — PROGRESS NOTES
Preventive Care Visit  [facility]  Brenda Godoy MD, Internal Medicine  Oct 31, 2024      Assessment & Plan     Encounter for annual wellness exam in Medicare patient  Will do labs today, medications refilled discussed to continue calcium, vitamin D and B complex    Hypercholesterolemia  LDL goal less than 100 due to history of a stroke, she is on Crestor  - Lipid panel reflex to direct LDL Non-fasting  - Comprehensive metabolic panel  - TSH with free T4 reflex    Diarrhea, unspecified type  She has history of IBS, currently she only has 1 bowel movement today but consistency of it is kind of watery, discussed to add psyllium husk supplement to bulk up the stool, she denies any abdominal pain, she did have history of lymphocytic colitis, currently I doubt it is the cause of diarrhea since she does not go very frequent, discussed to let me know if frequency of diarrhea gets worse.  - CBC with platelets and differential  - Comprehensive metabolic panel  - TSH with free T4 reflex    Cerebrovascular accident (CVA), unspecified mechanism (H)  Blood pressure is well-controlled, she is on Crestor and baby aspirin.  She does not trust her  to do bills because he tends to overspend on supplements, currently it sounds that Bill paying is stressful for her but she declined referral to care management.  - rosuvastatin (CRESTOR) 10 MG tablet; Take 1 tablet (10 mg) by mouth daily.  - CBC with platelets and differential    Irritable bowel syndrome with diarrhea  As above    Major Depression, Single Episode In Remission  Currently she is followed with psychologist, most recent psychiatrist took her off antidepressants and she has been doing quite well.  - TSH with free T4 reflex    Vitamin D deficiency  - Vitamin D Deficiency    Primary hypertension  Medications refilled, she denies dizziness.  - amLODIPine (NORVASC) 2.5 MG tablet; Take 1 tablet (2.5 mg) by mouth daily.  - metoprolol succinate ER  "(TOPROL XL) 50 MG 24 hr tablet; Take 1 tablet (50 mg) by mouth daily.  - Comprehensive metabolic panel  - TSH with free T4 reflex    S/P TAVR (transcatheter aortic valve replacement)  She is followed by cardiology, she is due to repeat echocardiogram, continue amoxicillin 4 oh endocarditis prophylaxis  - CBC with platelets and differential    Age-related osteoporosis with current pathological fracture, sequela  DEXA scan 2 years ago showed osteopenia, she started on Fosamax only recently after a hip fracture.  Parathyroid vitamin D and calcium levels were normal.  Discussed that she can wait another year before repeating DEXA scan since she just started on antiresorptive therapy.  - alendronate (FOSAMAX) 70 MG tablet; Take 1 tablet (70 mg) by mouth every 7 days.    BMI  Estimated body mass index is 25.87 kg/m  as calculated from the following:    Height as of this encounter: 1.702 m (5' 7\").    Weight as of this encounter: 74.9 kg (165 lb 3.2 oz).       Counseling  Appropriate preventive services were addressed with this patient via screening, questionnaire, or discussion as appropriate for fall prevention, nutrition, physical activity, Tobacco-use cessation, social engagement, weight loss and cognition.  Checklist reviewing preventive services available has been given to the patient.  Reviewed patient's diet, addressing concerns and/or questions.   She is at risk for lack of exercise and has been provided with information to increase physical activity for the benefit of her well-being.   Updated plan of care.  Patient reported difficulty with activities of daily living were addressed today.Information on urinary incontinence and treatment options given to patient.         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  She is here for a wellness exam.    She continues to have intermittent diarrhea, her bowels can be watery but usually happens once a day, no more than twice a day, she denies any nighttime symptoms.    Ellen and her  live in an apartment for seniors.  Her  is a \"dream\" per patient and sometimes spends a lot of money on supplements so she is trying to manage their finances herself and sometimes bills can be stressful, currently she declined referral to care management for help.    She has been off her psychiatric meds under the care of her previous psychiatrist and currently sees therapist regularly and denies any recurrence of depression or OCD symptoms.  She has mild anxiety but not overwhelming, she sleeps well.    Her vision is good, she does see ophthalmologist yearly.    No problems with hearing, recent fall did result in a hip fracture, she is currently using a walker and we discussed to continue using it for stability.  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    Health Care Directive  Patient does not have a Health Care Directive: Not addressed      10/31/2024   General Health   How would you rate your overall physical health? Good   Feel stress (tense, anxious, or unable to sleep) To some extent      (!) STRESS CONCERN      10/31/2024   Nutrition   Diet: Regular (no restrictions)            10/31/2024   Exercise   Days per week of moderate/strenous exercise 2 days   Average minutes spent exercising at this level 20 min      (!) EXERCISE CONCERN      10/31/2024   Social Factors "   Frequency of gathering with friends or relatives Twice a week   Worry food won't last until get money to buy more No   Food not last or not have enough money for food? No   Do you have housing? (Housing is defined as stable permanent housing and does not include staying ouside in a car, in a tent, in an abandoned building, in an overnight shelter, or couch-surfing.) Yes   Are you worried about losing your housing? No   Lack of transportation? No   Unable to get utilities (heat,electricity)? No            10/31/2024   Fall Risk   Fallen 2 or more times in the past year? Yes    Trouble with walking or balance? Yes    Gait Speed Test (Document in seconds) 6.35   Gait Speed Test Interpretation Greater than 5.01 seconds - ABNORMAL       Patient-reported          10/31/2024   Activities of Daily Living- Home Safety   Needs help with the following daily activites Bathing   Safety concerns in the home None of the above            10/31/2024   Dental   Dentist two times every year? Yes            10/31/2024   Hearing Screening   Hearing concerns? None of the above            10/31/2024   Driving Risk Screening   Patient/family members have concerns about driving No            10/31/2024   General Alertness/Fatigue Screening   Have you been more tired than usual lately? No            10/31/2024   Urinary Incontinence Screening   Bothered by leaking urine in past 6 months Yes            10/31/2024   TB Screening   Were you born outside of the [place]? No          Today's PHQ-9 Score:       10/31/2024    11:02 AM   PHQ-9 SCORE   PHQ-9 Total Score MyChart 2 (Minimal depression)   PHQ-9 Total Score 2        Patient-reported         10/31/2024   Substance Use   Alcohol more than 3/day or more than 7/wk No   Do you have a current opioid prescription? No   How severe/bad is pain from 1 to 10? 2/10   Do you use any other substances recreationally? No        Social History     Tobacco Use    Smoking status: Never    Smokeless tobacco:  Never   Vaping Use    Vaping status: Never Used   Substance Use Topics    Alcohol use: Yes     Alcohol/week: 0.8 standard drinks of alcohol     Comment: Alcoholic Drinks/day: 1 glass of red wine    Drug use: No         12/9/2022   LAST FHS-7 RESULTS   1st degree relative breast or ovarian cancer No   Any relative bilateral breast cancer No   Any male have breast cancer No   Any ONE woman have BOTH breast AND ovarian cancer No   Any woman with breast cancer before 50yrs No   2 or more relatives with breast AND/OR ovarian cancer No   2 or more relatives with breast AND/OR bowel cancer No        Reviewed and updated as needed this visit by Provider                      Current providers sharing in care for this patient include:  Patient Care Team:  Brenda Godoy MD as PCP - General (Internal Medicine)  Brenda Godoy MD as Assigned PCP  Juliet Julio MD as MD (Cardiovascular Disease)  Juliet Julio MD as Assigned Heart and Vascular Provider    The following health maintenance items are reviewed in Epic and correct as of today:  Health Maintenance   Topic Date Due    ANNUAL REVIEW OF HM ORDERS  10/11/2022    LIPID  10/27/2024    MEDICARE ANNUAL WELLNESS VISIT  10/27/2024    COVID-19 Vaccine (10 - 2024-25 season) 11/01/2024    PHQ-9  04/30/2025    BMP  06/03/2025    FALL RISK ASSESSMENT  10/31/2025    ADVANCE CARE PLANNING  10/27/2028    DTAP/TDAP/TD IMMUNIZATION (3 - Td or Tdap) 10/21/2033    DEXA  12/09/2037    DEPRESSION ACTION PLAN  Completed    INFLUENZA VACCINE  Completed    Pneumococcal Vaccine: 65+ Years  Completed    ZOSTER IMMUNIZATION  Completed    RSV VACCINE  Completed    HPV IMMUNIZATION  Aged Out    MENINGITIS IMMUNIZATION  Aged Out    RSV MONOCLONAL ANTIBODY  Aged Out       Review of systems: As above, she denies any abdominal pain acid reflux or blood in the stool, no dizziness palpitations or chest pain.     Objective    Exam  /68 (BP Location: Left arm, Patient Position: Sitting, Cuff  "Size: Adult Regular)   Pulse 72   Temp 100.3  F (37.9  C) (Tympanic)   Resp 15   Ht 1.702 m (5' 7\")   Wt 74.9 kg (165 lb 3.2 oz)   LMP  (LMP Unknown)   SpO2 98%   BMI 25.87 kg/m     Estimated body mass index is 25.87 kg/m  as calculated from the following:    Height as of this encounter: 1.702 m (5' 7\").    Weight as of this encounter: 74.9 kg (165 lb 3.2 oz).    Physical Exam  General: well appearing female, alert and oriented x3, she using a walker  EYES: Eyelids, conjunctiva, and sclera were normal. Pupils were normal.   HEAD, EARS, NOSE, MOUTH, AND THROAT: no cervical LAD, no thyromegaly or nodules appreciated. TMs are visualized and normal, oropharynx is clear.  RESPIRATORY: respirations non labored, CTA bl, no wheezes, rales, no forced expiratory wheezing.  CARDIOVASCULAR: Heart rate and rhythm were normal.  Mild systolic ejection murmur right and left sternal border. There was no peripheral edema. No carotid bruits.  GASTROINTESTINAL: Positive bowel sounds, abdomen is soft, non tender, non distended.     MUSCULOSKELETAL: Muscle mass was normal for age. No joint synovitis or deformity.  LYMPHATIC: There were no enlarged nodes palpable.  SKIN/HAIR/NAILS: Skin color was normal.  No rashes.  NEUROLOGIC: The patient was alert and oriented.  Speech was normal.  There is no facial asymmetry.   PSYCHIATRIC:  Mood and affect were normal.   Breast exam: No axilla lymphadenopathy, breast masses or skin changes appreciated.          10/31/2024   Mini Cog   Clock Draw Score 2 Normal   3 Item Recall 3 objects recalled   Mini Cog Total Score 5           Signed Electronically by: Brenda Godoy MD    Answers submitted by the patient for this visit:  Patient Health Questionnaire (Submitted on 10/31/2024)  If you checked off any problems, how difficult have these problems made it for you to do your work, take care of things at home, or get along with other people?: Not difficult at all  PHQ9 TOTAL SCORE: 2    "

## 2024-10-31 NOTE — PATIENT INSTRUCTIONS
Let me know if diarrhea is more frequent then 2 Bms a day or you have to have a BM/diarrhea at night.    2. Take psyllium supplement daily to help bulk up the stool and prevent watery stools.     3. Prescription meds refilled.    4. Continue calcium, vitamin D and B complex vitamins.

## 2024-11-01 LAB
ALBUMIN SERPL BCG-MCNC: 4.3 G/DL (ref 3.5–5.2)
ALP SERPL-CCNC: 131 U/L (ref 40–150)
ALT SERPL W P-5'-P-CCNC: 19 U/L (ref 0–50)
ANION GAP SERPL CALCULATED.3IONS-SCNC: 10 MMOL/L (ref 7–15)
AST SERPL W P-5'-P-CCNC: 27 U/L (ref 0–45)
BILIRUB SERPL-MCNC: 0.3 MG/DL
BUN SERPL-MCNC: 25.2 MG/DL (ref 8–23)
CALCIUM SERPL-MCNC: 9.6 MG/DL (ref 8.8–10.4)
CHLORIDE SERPL-SCNC: 107 MMOL/L (ref 98–107)
CHOLEST SERPL-MCNC: 132 MG/DL
CREAT SERPL-MCNC: 0.98 MG/DL (ref 0.51–0.95)
EGFRCR SERPLBLD CKD-EPI 2021: 57 ML/MIN/1.73M2
FASTING STATUS PATIENT QL REPORTED: NO
FASTING STATUS PATIENT QL REPORTED: NO
GLUCOSE SERPL-MCNC: 88 MG/DL (ref 70–99)
HCO3 SERPL-SCNC: 21 MMOL/L (ref 22–29)
HDLC SERPL-MCNC: 54 MG/DL
LDLC SERPL CALC-MCNC: 59 MG/DL
NONHDLC SERPL-MCNC: 78 MG/DL
POTASSIUM SERPL-SCNC: 4.7 MMOL/L (ref 3.4–5.3)
PROT SERPL-MCNC: 7.2 G/DL (ref 6.4–8.3)
SODIUM SERPL-SCNC: 138 MMOL/L (ref 135–145)
TRIGL SERPL-MCNC: 95 MG/DL
TSH SERPL DL<=0.005 MIU/L-ACNC: 2.67 UIU/ML (ref 0.3–4.2)
VIT D+METAB SERPL-MCNC: 48 NG/ML (ref 20–50)

## 2024-11-01 NOTE — TELEPHONE ENCOUNTER
November 1, 2024    Home health orders was picked up from outbox of Dr. Godoy and sent via fax to [phone].    Gladys Henderson

## 2024-11-05 ENCOUNTER — TELEPHONE (OUTPATIENT)
Dept: INTERNAL MEDICINE | Facility: CLINIC | Age: 84
End: 2024-11-05
Payer: MEDICARE

## 2024-11-05 NOTE — TELEPHONE ENCOUNTER
November 5, 2024    Home health orders was received via fax for Dr. Godoy.  Patient label was attached to paperwork and placed in provider's inbox to be signed.    Shellie Syed

## 2024-11-06 ENCOUNTER — TELEPHONE (OUTPATIENT)
Dept: INTERNAL MEDICINE | Facility: CLINIC | Age: 84
End: 2024-11-06
Payer: MEDICARE

## 2024-11-06 NOTE — TELEPHONE ENCOUNTER
November 6, 2024    Home health orders was received via fax for Dr. Godoy.  Patient label was attached to paperwork and placed in provider's inbox to be signed.    Shellie Syed

## 2024-11-08 NOTE — TELEPHONE ENCOUNTER
November 8, 2024    Home health orders was picked up from outbox of Dr. Godoy and sent via fax to [phone].    Gladys Henderson

## 2024-11-08 NOTE — TELEPHONE ENCOUNTER
November 8, 2024     Home health orders was picked up from outbox of Dr. Godoy and sent via fax to [phone].     Gladys Hendesron

## 2024-12-05 ENCOUNTER — MEDICAL CORRESPONDENCE (OUTPATIENT)
Dept: HEALTH INFORMATION MANAGEMENT | Facility: CLINIC | Age: 84
End: 2024-12-05

## 2024-12-11 ENCOUNTER — TELEPHONE (OUTPATIENT)
Dept: INTERNAL MEDICINE | Facility: CLINIC | Age: 84
End: 2024-12-11
Payer: MEDICARE

## 2024-12-11 NOTE — TELEPHONE ENCOUNTER
December 11, 2024    [facility] Plan of Care was received via fax for Dr. Godoy.  Patient label was attached to paperwork and placed in provider's inbox to be signed.    Shellie Syed

## 2024-12-12 NOTE — TELEPHONE ENCOUNTER
December 12, 2024    [facility] Plan of Care was picked up from outbox of Dr. Godoy and sent via fax to [phone].    Shellie Syed

## 2025-01-06 ENCOUNTER — TELEPHONE (OUTPATIENT)
Dept: INTERNAL MEDICINE | Facility: CLINIC | Age: 85
End: 2025-01-06
Payer: MEDICARE

## 2025-01-06 NOTE — TELEPHONE ENCOUNTER
January 6, 2025    Home health orders was received via fax for Dr. Godoy.  Patient label was attached to paperwork and placed in provider's inbox to be signed.    Gladys Henderson

## 2025-01-15 ENCOUNTER — TELEPHONE (OUTPATIENT)
Dept: INTERNAL MEDICINE | Facility: CLINIC | Age: 85
End: 2025-01-15
Payer: MEDICARE

## 2025-01-15 NOTE — TELEPHONE ENCOUNTER
January 15, 2025    Home health orders was received via fax for Dr. Godoy.  Patient label was attached to paperwork and placed in provider's inbox to be signed.    Shellie Syed

## 2025-02-03 ENCOUNTER — TRANSFERRED RECORDS (OUTPATIENT)
Dept: HEALTH INFORMATION MANAGEMENT | Facility: CLINIC | Age: 85
End: 2025-02-03
Payer: MEDICARE

## 2025-02-12 ENCOUNTER — TELEPHONE (OUTPATIENT)
Dept: INTERNAL MEDICINE | Facility: CLINIC | Age: 85
End: 2025-02-12
Payer: MEDICARE

## 2025-02-12 NOTE — TELEPHONE ENCOUNTER
February 12, 2025    [facility] PT Eval was received via fax for Dr. Godoy.  Patient label was attached to paperwork and placed in provider's inbox to be signed.    Shellie Syed

## 2025-02-17 ENCOUNTER — TELEPHONE (OUTPATIENT)
Dept: INTERNAL MEDICINE | Facility: CLINIC | Age: 85
End: 2025-02-17
Payer: MEDICARE

## 2025-02-17 NOTE — TELEPHONE ENCOUNTER
February 17, 2025    Home health orders was received via fax for Dr. Godoy.  Patient label was attached to paperwork and placed in provider's inbox to be signed.    Shellie Syed

## 2025-02-20 NOTE — TELEPHONE ENCOUNTER
February 20, 2025    Home health orders was picked up from outbox of Dr. Godoy and sent via fax to [phone].    Shellie Syed

## 2025-02-27 ENCOUNTER — OFFICE VISIT (OUTPATIENT)
Dept: INTERNAL MEDICINE | Facility: CLINIC | Age: 85
End: 2025-02-27
Payer: COMMERCIAL

## 2025-02-27 VITALS
OXYGEN SATURATION: 100 % | TEMPERATURE: 97.7 F | DIASTOLIC BLOOD PRESSURE: 73 MMHG | HEART RATE: 63 BPM | SYSTOLIC BLOOD PRESSURE: 118 MMHG | WEIGHT: 165 LBS | BODY MASS INDEX: 25.9 KG/M2 | RESPIRATION RATE: 16 BRPM | HEIGHT: 67 IN

## 2025-02-27 DIAGNOSIS — R19.7 DIARRHEA, UNSPECIFIED TYPE: Primary | ICD-10-CM

## 2025-02-27 DIAGNOSIS — I50.30 DIASTOLIC HEART FAILURE, UNSPECIFIED HF CHRONICITY (H): ICD-10-CM

## 2025-02-27 DIAGNOSIS — D50.9 IRON DEFICIENCY ANEMIA, UNSPECIFIED IRON DEFICIENCY ANEMIA TYPE: ICD-10-CM

## 2025-02-27 DIAGNOSIS — Z87.81 HISTORY OF HIP FRACTURE: ICD-10-CM

## 2025-02-27 LAB
ANION GAP SERPL CALCULATED.3IONS-SCNC: 9 MMOL/L (ref 7–15)
BASOPHILS # BLD AUTO: 0 10E3/UL (ref 0–0.2)
BASOPHILS NFR BLD AUTO: 0 %
BUN SERPL-MCNC: 19.5 MG/DL (ref 8–23)
CALCIUM SERPL-MCNC: 9.7 MG/DL (ref 8.8–10.4)
CHLORIDE SERPL-SCNC: 105 MMOL/L (ref 98–107)
CREAT SERPL-MCNC: 1 MG/DL (ref 0.51–0.95)
EGFRCR SERPLBLD CKD-EPI 2021: 56 ML/MIN/1.73M2
EOSINOPHIL # BLD AUTO: 0.1 10E3/UL (ref 0–0.7)
EOSINOPHIL NFR BLD AUTO: 1 %
ERYTHROCYTE [DISTWIDTH] IN BLOOD BY AUTOMATED COUNT: 13.8 % (ref 10–15)
GLUCOSE SERPL-MCNC: 64 MG/DL (ref 70–99)
HCO3 SERPL-SCNC: 24 MMOL/L (ref 22–29)
HCT VFR BLD AUTO: 35.4 % (ref 35–47)
HGB BLD-MCNC: 11.2 G/DL (ref 11.7–15.7)
IMM GRANULOCYTES # BLD: 0 10E3/UL
IMM GRANULOCYTES NFR BLD: 0 %
IRON BINDING CAPACITY (ROCHE): 446 UG/DL (ref 240–430)
IRON SATN MFR SERPL: 10 % (ref 15–46)
IRON SERPL-MCNC: 44 UG/DL (ref 37–145)
LYMPHOCYTES # BLD AUTO: 1.9 10E3/UL (ref 0.8–5.3)
LYMPHOCYTES NFR BLD AUTO: 27 %
MCH RBC QN AUTO: 27.9 PG (ref 26.5–33)
MCHC RBC AUTO-ENTMCNC: 31.6 G/DL (ref 31.5–36.5)
MCV RBC AUTO: 88 FL (ref 78–100)
MONOCYTES # BLD AUTO: 0.6 10E3/UL (ref 0–1.3)
MONOCYTES NFR BLD AUTO: 9 %
NEUTROPHILS # BLD AUTO: 4.5 10E3/UL (ref 1.6–8.3)
NEUTROPHILS NFR BLD AUTO: 62 %
PLATELET # BLD AUTO: 229 10E3/UL (ref 150–450)
POTASSIUM SERPL-SCNC: 4.4 MMOL/L (ref 3.4–5.3)
RBC # BLD AUTO: 4.01 10E6/UL (ref 3.8–5.2)
SODIUM SERPL-SCNC: 138 MMOL/L (ref 135–145)
TSH SERPL DL<=0.005 MIU/L-ACNC: 3.43 UIU/ML (ref 0.3–4.2)
WBC # BLD AUTO: 7.2 10E3/UL (ref 4–11)

## 2025-02-27 ASSESSMENT — PAIN SCALES - GENERAL: PAINLEVEL_OUTOF10: NO PAIN (0)

## 2025-02-27 ASSESSMENT — ENCOUNTER SYMPTOMS: DIARRHEA: 1

## 2025-02-27 NOTE — NURSING NOTE
Pt tolerated injection (Covid 19 12+ Pfizer Comirnaty Vaccine). Site (left deltoid) was cleansed with alcohol prior to injections. No pain, burning, swelling or redness at the site of the injection. Patient instructed to remain in clinic for 15 minutes afterwards, and to report any adverse reactions

## 2025-02-27 NOTE — PROGRESS NOTES
Assessment & Plan     Diarrhea, unspecified type  Current systems are not consistent with collagenous colitis since they are intermittent.  She does not have more than 1 or 2 bowel movements a day.  She is more disturbed that frequently about 3 x a week 2 hours after dinner she would have an explosive diarrhea and a lot of urgency which occasionally caused incontinence.  She does have history of IBS, she is currently on psyllium capsules and takes Imodium occasionally but denies any constipation, no abdominal pain.  Her problems with diarrhea chronic, this is not new but increased urgency and explosiveness of bowel movements harder to manage when it does happen.  Celiac screening was negative last year.  Will repeat labs today, discussed FODMAP diet, handout was provided.  She will try eliminating certain foods and then reintroducing them, that should help.  If symptoms are persistent, I did recommend that she sees gastroenterologist.  - Adult GI  Referral - Consult Only; Future  - CBC with platelets and differential  - Basic metabolic panel  (Ca, Cl, CO2, Creat, Gluc, K, Na, BUN)  - TSH with free T4 reflex  - Iron and iron binding capacity    Iron deficiency anemia, unspecified iron deficiency anemia type  She had fallen a hip fracture in September and had ORIF surgery, since then mild anemia has been more persistent, she has been on baby aspirin twice a day until now, discussed that she should definitely decrease it to once a day dosing (she does have history of TAVR and will need to stay on it indefinitely).  Currently she denies blood in the stool, no abdominal pain acid reflux.  If anemia is worsening, we could consider putting her on a PPI for period of time.  - CBC with platelets and differential  - Iron and iron binding capacity    Diastolic heart failure, unspecified HF chronicity (H)  She appears to be euvolemic, she is followed by cardiologist and echocardiogram was ordered for later this year.  " No swelling on exam.  Blood pressure is well-controlled on metoprolol and amlodipine    History of hip fracture  For osteoporosis we did start her on Fosamax in the fall, she is tolerating it well however if hemoglobin level is dropping we could consider switching to IV Reclast.    BMI  Estimated body mass index is 25.84 kg/m  as calculated from the following:    Height as of this encounter: 1.702 m (5' 7\").    Weight as of this encounter: 74.8 kg (165 lb).       Subjective   Meghan is a 84 year old, presenting for the following health issues:  Diarrhea and Recheck Medication (Pt reports that she's here to discuss having diarrhea that been a problem for a long period of time.)      2/27/2025    12:39 PM   Additional Questions   Roomed by ryan   Accompanied by alone         2/27/2025    12:39 PM   Patient Reported Additional Medications   Patient reports taking the following new medications none     Diarrhea    History of Present Illness       Reason for visit:  Diarrhea    She eats 2-3 servings of fruits and vegetables daily.She consumes 0 sweetened beverage(s) daily.She exercises with enough effort to increase her heart rate 9 or less minutes per day.  She exercises with enough effort to increase her heart rate 3 or less days per week.   She is taking medications regularly.      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 She is currently here to discuss intermittent diarrhea.    Meghan has chronic diarrhea on and off.  In the past she was on Zoloft but currently off of it. Also has had distant h/o collagenous colitis and IBS.  Currently her symptoms are not " "severe enough for collagenous colitis.  She only has diarrhea couple of times a week and usually just 1 bowel movement that is more explosive and unformed.  She denies constipation and does have bowel movements every day but it is more mushy in consistency and rarely formed.  Symptoms usually worse and more urgent about 2 hours after dinner when she has to run to the bathroom.  On several occasions she has soiled herself.  She denies any abdominal pain.  Sometimes she takes Imodium.  She does take psyllium capsules every morning.  For breakfast she usually eats granola with yogurt and denies any diarrhea during the day, it usually happens after dinner before bedtime.  Last year his celiac labs were negative.    She and her  are going to be moving from [facility] to [facility], is a still in independent living situation but since [facility] have more additional services they are planning for the future.  Meghan does not cook much and will most likely eat cafeteria food.    She also has had: A hip fracture in September and had fixation surgery of her right hip at that time.  Postoperatively she had mild anemia, she is currently still on baby aspirin twice a day.  Denies any abdominal pain or acid reflux.  We also started her on Fosamax in the fall.    Review of systems: As above, mood is good, she denies worsening depression or anxiety      Objective    /73 (BP Location: Left arm, Patient Position: Sitting, Cuff Size: Adult Regular)   Pulse 63   Temp 97.7  F (36.5  C) (Tympanic)   Resp 16   Ht 1.702 m (5' 7\")   Wt 74.8 kg (165 lb)   LMP  (LMP Unknown)   SpO2 100%   Breastfeeding No   BMI 25.84 kg/m    Body mass index is 25.84 kg/m .  Physical Exam   General: well appearing female, alert and oriented x3  EYES: Eyelids, conjunctiva, and sclera were normal. Pupils were normal.   HEAD, EARS, NOSE, MOUTH, AND THROAT: no cervical LAD, no thyromegaly or nodules appreciated. TMs are " visualized and normal, oropharynx is clear.  RESPIRATORY: respirations non labored, CTA bl, no wheezes, rales, no forced expiratory wheezing.  CARDIOVASCULAR: Heart rate and rhythm were normal. No murmurs, rubs,gallops. There was no peripheral edema. No carotid bruits.  GASTROINTESTINAL: Positive bowel sounds, abdomen is soft, non tender, non distended.     MUSCULOSKELETAL: Muscle mass was normal for age. No joint synovitis or deformity.  LYMPHATIC: There were no enlarged nodes palpable.  SKIN/HAIR/NAILS: Skin color was normal.  No rashes.  NEUROLOGIC: The patient was alert and oriented.  Speech was normal.  There is no facial asymmetry.   PSYCHIATRIC:  Mood and affect were normal.            Signed Electronically by: Brenda Godoy MD

## 2025-02-27 NOTE — PATIENT INSTRUCTIONS
For diarrhea: continue psyllium supplement, try FODMAP diet, keep a food diary: write down foods that you ate before diarrhea happened.    2. Instead of cows milk use Perry milk.     3. See gastroenterologist.     4. O'K to go to 81 mg aspirin once a day    5. Covid booster today

## 2025-03-02 ENCOUNTER — TELEPHONE (OUTPATIENT)
Dept: INTERNAL MEDICINE | Facility: CLINIC | Age: 85
End: 2025-03-02
Payer: MEDICARE

## 2025-03-02 DIAGNOSIS — D50.8 OTHER IRON DEFICIENCY ANEMIA: Primary | ICD-10-CM

## 2025-03-02 DIAGNOSIS — R19.7 DIARRHEA, UNSPECIFIED TYPE: ICD-10-CM

## 2025-03-03 NOTE — TELEPHONE ENCOUNTER
Please let pt know:    Kidney function is stable.  Electrolytes are good.    Anemia is mild but stable, not better, not worse. Iron level is on the lower side. This could be from stomach irritation form aspirin or a colon problem. It has been present since 2023.   Due to persistent anemia, I would recommend doing colonoscopy and endoscopy, please let me know if O'K for me to put referral in for [facility].  Please decrease Aspirin to 81 mg once a day and take it with food.  Could also add Omeprazole 20 mg before dinner to help with possible gastritis from aspirin. I send Rx in.    Thyroid level is good.    DR MARQUIS

## 2025-03-03 NOTE — TELEPHONE ENCOUNTER
Relayed provider  advisement to patient. Patient is agreeable to colonoscopy and endoscopy, has [facility] scheduling number.  Patient would not like to start omeprazole at this time.

## 2025-03-06 DIAGNOSIS — I63.9 CEREBROVASCULAR ACCIDENT (CVA), UNSPECIFIED MECHANISM (H): ICD-10-CM

## 2025-03-06 RX ORDER — ROSUVASTATIN CALCIUM 10 MG/1
10 TABLET, COATED ORAL DAILY
Qty: 90 TABLET | Refills: 1 | OUTPATIENT
Start: 2025-03-06

## 2025-05-01 DIAGNOSIS — I63.9 CEREBROVASCULAR ACCIDENT (CVA), UNSPECIFIED MECHANISM (H): ICD-10-CM

## 2025-05-01 RX ORDER — ROSUVASTATIN CALCIUM 10 MG/1
10 TABLET, COATED ORAL DAILY
Qty: 90 TABLET | Refills: 2 | Status: SHIPPED | OUTPATIENT
Start: 2025-05-01

## 2025-05-23 ENCOUNTER — TRANSFERRED RECORDS (OUTPATIENT)
Dept: ADMINISTRATIVE | Facility: CLINIC | Age: 85
End: 2025-05-23
Payer: MEDICARE

## 2025-05-27 ENCOUNTER — RESULTS FOLLOW-UP (OUTPATIENT)
Dept: GASTROENTEROLOGY | Facility: CLINIC | Age: 85
End: 2025-05-27
Payer: MEDICARE

## 2025-05-27 NOTE — PROCEDURES
[facility]   [address], [address], [place] [address]     Patient Name: Ellen Felipe  Gender:  Female  Exam Date: 05/23/2025 Visit Number:  [id number]  Age: 84 Years YOB: 1940  Attending MD: Yovany Salguero MD Medical Record#:  [id number]    Procedure: Colonoscopy   Indications: Iron deficiency anemia       Diarrhea      Referring MD: Brenda Godoy MD  Primary MD:      Brenda Godoy MD  Medications: Intra Procedure Medications:   Patient received monitored anesthesia care.     Complications: No immediate complications  ______________________________________________________________________________  Procedure:   An examination of the heart and lungs was performed and found to be within acceptable limits.  .  The patient was therefore deemed a reasonable candidate for endoscopy and sedation.   The risks and benefits of the procedure were explained to the patient.After obtaining informed consent, the patient received monitored anesthesia care and I passed the scope   with difficulty via the rectum to the ileum.  The appendiceal orifice and ic valve were identified.  The scope was retroflexed during the examination  The quality of the prep was good  (Miralax/Gatorade/2 tablets Bisacodyl/Magnesium Citrate).    This was a complete examination throughout the entire colon.    Findings:    Normal finding.  Location - ileum.    Diverticulosis.  Location: - descending colon - sigmoid.        Quantity:  many.  No inflammation present.    There were some fine sand/seedlike particles in the colon which clog to the working channel. As a result only a few bites of random colon biopsies could be obtained since the working channel got clogged and would not let the biopsy forceps pass.    .Hemorrhoids.  Internal hemorrhoids without bleeding.    Very redundant and tortuous colon  Remainder of the exam is normal.  Random biopsies were taken throughout the colon to rule out microscopic  colitis.    Impression:  Iron deficiency anemia, unspecified iron deficiency anemia type  Diarrhea, unspecified type  Diverticulosis of colon without diverticulitis    Preliminary Plan:  Repeat colonoscopy not indicated at this time    Procedure:    Upper GI Endoscopy   Indications:    Iron Deficiency Anemia   Diarrhea  Provider:        Yovany Salguero MD   Referring MD: Brenda Godoy MD   Primary MD:      Brenda Godoy MD  Medications:    Intra Procedure Medications:   Patient received monitored anesthesia care.     Complications: No immediate complications  ___________________________________________________________________________________________  Procedure:   An examination of the heart and lungs was performed within acceptable limits.  . The patient was therefore deemed a reasonable candidate for sedation.   The risks and benefits were explained to the patient, who appeared to understand. After obtaining informed consent, the scope was passed under direct vision. Throughout the procedure the patient's blood pressure, pulse and oxygen saturations were monitored.  The scope was introduced through the mouth and advanced to the second portion of duodenum.         Findings:   Esophagus:    Normal esophagus.   The z-line is 42 centimeters from the incisors.  Top of the gastric folds is 42 centimeters from the incisors.  Stomach:    Normal stomach.    H. Pylori biopsies taken.   The diaphragm hiatus is at 42 centimeters from the incisors.  Duodenum:    Normal duodenum.    Celiac Sprue biopsies taken.  Celiac Sprue biopsies taken.  Impression:   Diarrhea, unspecified type  Iron deficiency anemia, unspecified iron deficiency anemia type  Pathology Results:  A: STOMACH, BIOPSY:           1. Normal gastric antral and body mucosae           2. Negative for Helicobacter      B: DUODENUM, BIOPSY:           1. Normal duodenal mucosa           2. Negative for celiac disease and other enteropathy      C: COLON, RANDOM,  BIOPSY:           1. Collagenous colitis           2. See comment      COMMENTS  C. The histologic changes are typical of the collagenous colitis form of microscopic colitis. This diagnosis is further supported by the history of diarrhea and normal appearance of the mucosa on colonoscopy.     Some cases of collagenous colitis have been associated with medication use; high likelihood associations include NSAIDs, ASA, lansoprazole, ranitidine, sertraline, ticlopidine, and acarbose, with many other medications having been implicated as well.      MICROSCOPIC  A: Performed   B: Performed   C: Performed     Electronically signed by: Reagan Yousif MD    Interpreted at [facility], [address] [place] [phone]    Orders    Instruction(s)/Education:  Instruction/Education Timeframe Assessment   Colon Cancer Prevention  K57.30   Diverticulosis/Diverticulitis  K57.30   Hemorrhoids (Internal)  K57.30       Final Plan:  Repeat colonoscopy not indicated at this time.    We will attempt to contact you at appropriate intervals via [place] mail.  We may not be able to find you or contact you at that time, therefore you should know that the responsibility for following our recommendation rests with you.  If you don't hear from us at the time your procedure is due, please contact our office to schedule an appointment.  If your contact information should change, please contact our office so that we can update your record.  Additional Comments:  I left a detailed voicemail on your phone with this information.  A prescription for treatment will be sent to pharmacy.  Please follow-up in clinic to discuss further management.      _Electronically signed by:___________________  Yovany Salguero MD                 05/23/2025    cc: Brenda Godoy MD  cc: Brenda Godoy MD  cc: Lupe Quintero MD  cc: Guille Moore DO

## 2025-07-02 ENCOUNTER — OFFICE VISIT (OUTPATIENT)
Dept: INTERNAL MEDICINE | Facility: CLINIC | Age: 85
End: 2025-07-02
Payer: MEDICARE

## 2025-07-02 ENCOUNTER — TELEPHONE (OUTPATIENT)
Dept: INTERNAL MEDICINE | Facility: CLINIC | Age: 85
End: 2025-07-02

## 2025-07-02 VITALS
BODY MASS INDEX: 25.27 KG/M2 | WEIGHT: 161 LBS | HEIGHT: 67 IN | DIASTOLIC BLOOD PRESSURE: 75 MMHG | HEART RATE: 65 BPM | OXYGEN SATURATION: 97 % | RESPIRATION RATE: 16 BRPM | TEMPERATURE: 97.8 F | SYSTOLIC BLOOD PRESSURE: 133 MMHG

## 2025-07-02 DIAGNOSIS — D64.9 ANEMIA, UNSPECIFIED TYPE: ICD-10-CM

## 2025-07-02 DIAGNOSIS — E55.9 VITAMIN D DEFICIENCY: ICD-10-CM

## 2025-07-02 DIAGNOSIS — D50.9 IRON DEFICIENCY ANEMIA, UNSPECIFIED IRON DEFICIENCY ANEMIA TYPE: Primary | ICD-10-CM

## 2025-07-02 DIAGNOSIS — K52.831 COLLAGENOUS COLITIS: Chronic | ICD-10-CM

## 2025-07-02 DIAGNOSIS — D72.820 ATYPICAL LYMPHOCYTOSIS: ICD-10-CM

## 2025-07-02 DIAGNOSIS — M81.0 AGE-RELATED OSTEOPOROSIS WITHOUT CURRENT PATHOLOGICAL FRACTURE: ICD-10-CM

## 2025-07-02 DIAGNOSIS — M80.00XD AGE-RELATED OSTEOPOROSIS WITH CURRENT PATHOLOGICAL FRACTURE WITH ROUTINE HEALING, SUBSEQUENT ENCOUNTER: Primary | ICD-10-CM

## 2025-07-02 DIAGNOSIS — Z78.0 POST-MENOPAUSAL: ICD-10-CM

## 2025-07-02 LAB
ANION GAP SERPL CALCULATED.3IONS-SCNC: 10 MMOL/L (ref 7–15)
BASOPHILS # BLD AUTO: 0 10E3/UL (ref 0–0.2)
BASOPHILS NFR BLD AUTO: 1 %
BUN SERPL-MCNC: 32.2 MG/DL (ref 8–23)
CALCIUM SERPL-MCNC: 9.5 MG/DL (ref 8.8–10.4)
CHLORIDE SERPL-SCNC: 107 MMOL/L (ref 98–107)
CREAT SERPL-MCNC: 1.14 MG/DL (ref 0.51–0.95)
EGFRCR SERPLBLD CKD-EPI 2021: 48 ML/MIN/1.73M2
EOSINOPHIL # BLD AUTO: 0.1 10E3/UL (ref 0–0.7)
EOSINOPHIL NFR BLD AUTO: 1 %
ERYTHROCYTE [DISTWIDTH] IN BLOOD BY AUTOMATED COUNT: 13.8 % (ref 10–15)
GLUCOSE SERPL-MCNC: 82 MG/DL (ref 70–99)
HCO3 SERPL-SCNC: 24 MMOL/L (ref 22–29)
HCT VFR BLD AUTO: 29 % (ref 35–47)
HGB BLD-MCNC: 8.9 G/DL (ref 11.7–15.7)
IMM GRANULOCYTES # BLD: 0 10E3/UL
IMM GRANULOCYTES NFR BLD: 0 %
IRON BINDING CAPACITY (ROCHE): 428 UG/DL (ref 240–430)
IRON SATN MFR SERPL: 4 % (ref 15–46)
IRON SERPL-MCNC: 19 UG/DL (ref 37–145)
LYMPHOCYTES # BLD AUTO: 3.1 10E3/UL (ref 0.8–5.3)
LYMPHOCYTES NFR BLD AUTO: 41 %
MCH RBC QN AUTO: 25.6 PG (ref 26.5–33)
MCHC RBC AUTO-ENTMCNC: 30.7 G/DL (ref 31.5–36.5)
MCV RBC AUTO: 84 FL (ref 78–100)
MONOCYTES # BLD AUTO: 0.6 10E3/UL (ref 0–1.3)
MONOCYTES NFR BLD AUTO: 8 %
NEUTROPHILS # BLD AUTO: 3.8 10E3/UL (ref 1.6–8.3)
NEUTROPHILS NFR BLD AUTO: 50 %
PLAT MORPH BLD: ABNORMAL
PLATELET # BLD AUTO: 204 10E3/UL (ref 150–450)
POTASSIUM SERPL-SCNC: 4.2 MMOL/L (ref 3.4–5.3)
RBC # BLD AUTO: 3.47 10E6/UL (ref 3.8–5.2)
RBC AGGLUT BLD QL: PRESENT
RBC MORPH BLD: ABNORMAL
RETICS # AUTO: 0.01 10E6/UL (ref 0.03–0.1)
RETICS/RBC NFR AUTO: 1.3 % (ref 0.5–2)
SODIUM SERPL-SCNC: 141 MMOL/L (ref 135–145)
VARIANT LYMPHS BLD QL SMEAR: PRESENT
VIT D+METAB SERPL-MCNC: 45 NG/ML (ref 20–50)
WBC # BLD AUTO: 7.6 10E3/UL (ref 4–11)

## 2025-07-02 PROCEDURE — 83970 ASSAY OF PARATHORMONE: CPT | Performed by: INTERNAL MEDICINE

## 2025-07-02 PROCEDURE — 83550 IRON BINDING TEST: CPT | Performed by: INTERNAL MEDICINE

## 2025-07-02 PROCEDURE — 3078F DIAST BP <80 MM HG: CPT | Performed by: INTERNAL MEDICINE

## 2025-07-02 PROCEDURE — 83540 ASSAY OF IRON: CPT | Performed by: INTERNAL MEDICINE

## 2025-07-02 PROCEDURE — 3075F SYST BP GE 130 - 139MM HG: CPT | Performed by: INTERNAL MEDICINE

## 2025-07-02 PROCEDURE — 85025 COMPLETE CBC W/AUTO DIFF WBC: CPT | Performed by: INTERNAL MEDICINE

## 2025-07-02 PROCEDURE — G2211 COMPLEX E/M VISIT ADD ON: HCPCS | Performed by: INTERNAL MEDICINE

## 2025-07-02 PROCEDURE — 36415 COLL VENOUS BLD VENIPUNCTURE: CPT | Performed by: INTERNAL MEDICINE

## 2025-07-02 PROCEDURE — 82306 VITAMIN D 25 HYDROXY: CPT | Performed by: INTERNAL MEDICINE

## 2025-07-02 PROCEDURE — 82607 VITAMIN B-12: CPT | Performed by: INTERNAL MEDICINE

## 2025-07-02 PROCEDURE — 80048 BASIC METABOLIC PNL TOTAL CA: CPT | Performed by: INTERNAL MEDICINE

## 2025-07-02 PROCEDURE — 1126F AMNT PAIN NOTED NONE PRSNT: CPT | Performed by: INTERNAL MEDICINE

## 2025-07-02 PROCEDURE — 85045 AUTOMATED RETICULOCYTE COUNT: CPT | Performed by: INTERNAL MEDICINE

## 2025-07-02 PROCEDURE — 99214 OFFICE O/P EST MOD 30 MIN: CPT | Performed by: INTERNAL MEDICINE

## 2025-07-02 RX ORDER — BUDESONIDE 3 MG/1
9 CAPSULE, COATED PELLETS ORAL EVERY MORNING
COMMUNITY
Start: 2025-07-02

## 2025-07-02 ASSESSMENT — PATIENT HEALTH QUESTIONNAIRE - PHQ9
10. IF YOU CHECKED OFF ANY PROBLEMS, HOW DIFFICULT HAVE THESE PROBLEMS MADE IT FOR YOU TO DO YOUR WORK, TAKE CARE OF THINGS AT HOME, OR GET ALONG WITH OTHER PEOPLE: SOMEWHAT DIFFICULT
SUM OF ALL RESPONSES TO PHQ QUESTIONS 1-9: 1
SUM OF ALL RESPONSES TO PHQ QUESTIONS 1-9: 1

## 2025-07-02 ASSESSMENT — PAIN SCALES - GENERAL: PAINLEVEL_OUTOF10: NO PAIN (0)

## 2025-07-02 NOTE — TELEPHONE ENCOUNTER
Patient returning call. Reviewed results and relayed PCP recommendations. Scheduled for follow up with PCP on 9/4. Patient had no further questions.    Norma Aden RN

## 2025-07-02 NOTE — PATIENT INSTRUCTIONS
Osteoporosis: take calcium 600 mg twice a day , have bone density test done,  we will check kidney function today and I will put an order for IV Reclast once a year infusion to replace oral Fosamax pill. Infusion clinic will call you to set up once they approve with insurance.    2. Have DEXA scan done.    3. Continue Budesonide as instructed by MONIQUE.

## 2025-07-02 NOTE — TELEPHONE ENCOUNTER
Attempted to call pt, no answer (1/3). Left message requesting pt to call back clinic. See recent PCP note for this encounter.

## 2025-07-02 NOTE — PROGRESS NOTES
"  Assessment & Plan     Age-related osteoporosis with current pathological fracture with routine healing, subsequent encounter  History of hip fracture last year, has had difficulty taking Fosamax consistently, due for repeat bone density test.  Discussed IV Reclast and she is interested, I will check kidney function first.  - Basic metabolic panel  (Ca, Cl, CO2, Creat, Gluc, K, Na, BUN)  - Vitamin D Deficiency  - Parathyroid Hormone Intact  - DX Bone Density; Future    Vitamin D deficiency  - Vitamin D Deficiency    Collagenous colitis  Was seen by GI and had endoscopy and colonoscopy done which were both normal, no H. pylori or celiac on biopsies, positive for collagenous colitis, currently on budesonide 9 mg a day with taper, will be seeing GI tomorrow.  - budesonide (ENTOCORT EC) 3 MG EC capsule; Take 3 capsules (9 mg) by mouth every morning.    Anemia, unspecified type  History of chronic anemia, no source noted on recent endoscopy and colonoscopy.  She is on chronic aspirin due to history of CVA.  No esophageal or gastric ulcers noted on endoscopy in May of this year, interestingly hemoglobin has declined from 11.2-8.9, will check additional lab work, consider referring to hematology.  - CBC with platelets and differential  - Iron and iron binding capacity  - Vitamin B12; Future  - Reticulocyte count; Future  - Reticulocyte count    The longitudinal plan of care for the diagnosis(es)/condition(s) as documented were addressed during this visit. Due to the added complexity in care, I will continue to support Meghan in the subsequent management and with ongoing continuity of care.       BMI  Estimated body mass index is 25.21 kg/m  as calculated from the following:    Height as of this encounter: 1.702 m (5' 7.01\").    Weight as of this encounter: 73 kg (161 lb).       Subjective   Meghan is a 84 year old, presenting for the following health issues:  Constipation (Constipation - possible side effect from meds.)      " "7/2/2025     7:39 AM   Additional Questions   Roomed by Stephanie TEIXEIRA     History of Present Illness       Reason for visit:  Constipation   She is taking medications regularly.      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  She is currently here for follow-up.    Most recently due to persistent mild diarrhea she did see gastroenterology, she also had anemia and that is why had endoscopy and colonoscopy done, both were normal, H. pylori and celiac testing were negative, no polyps were removed.  She was started on budesonide 9 mg taper and currently is taking it regularly, she does not remember exactly when she started it and has GI follow-up tomorrow.  Currently denies any abdominal pain, diarrhea is much better.    She does have diagnosis of osteoporosis due to hip fracture last year.  She has not had DEXA scan for several years, previously I started her on Fosamax but currently since she has to stay upright for 2 hours after taking it and sometimes forgets to take it she is interested in different treatment options.  Currently she is taking calcium 600 mg 2 pills a day.    Review of systems: As above      Objective    /75 (BP Location: Left arm, Patient Position: Sitting, Cuff Size: Adult Regular)   Pulse 65   Temp 97.8  F (36.6  C) (Temporal)   Resp 16   Ht 1.702 m (5' 7.01\")   Wt 73 kg (161 lb)   LMP  (LMP Unknown)   SpO2 97%   BMI 25.21 kg/m    Body mass index is 25.21 kg/m .  Physical Exam   General: well appearing female, alert and oriented x3  EYES: Eyelids, conjunctiva, and sclera were normal. Pupils were normal.   HEAD, EARS, NOSE, MOUTH, " AND THROAT: no cervical LAD, no thyromegaly or nodules appreciated. TMs are visualized and normal, oropharynx is clear.  RESPIRATORY: respirations non labored, CTA bl, no wheezes, rales, no forced expiratory wheezing.  CARDIOVASCULAR: Heart rate and rhythm were normal. No murmurs, rubs,gallops. There was no peripheral edema. No carotid bruits.  GASTROINTESTINAL: Positive bowel sounds, abdomen is soft, non tender, non distended.     MUSCULOSKELETAL: Muscle mass was normal for age. No joint synovitis or deformity.  LYMPHATIC: There were no enlarged nodes palpable.  SKIN/HAIR/NAILS: Skin color was normal.  No rashes.  NEUROLOGIC: The patient was alert and oriented.  Speech was normal.  There is no facial asymmetry.   PSYCHIATRIC:  Mood and affect were normal.,  Memory is slightly decreased           Signed Electronically by: Brenda Godoy MD

## 2025-07-02 NOTE — TELEPHONE ENCOUNTER
Please let patient know, her hemoglobin level has declined a little bit more from previous blood work indicating worsening anemia.  I am not sure why this is happening since she did have endoscopy done and there were no gastritis or stomach ulcers.  I am adding extra blood work to check her B vitamin levels and iron. Since she mentioned that she will be seeing gastroenterology tomorrow, I want her to mention worsening anemia  to them, sometimes they do additional testing to look through small bowel for sources of blood loss.    She should follow-up with me in 2 months to have repeat blood work done.

## 2025-07-03 ENCOUNTER — PATIENT OUTREACH (OUTPATIENT)
Dept: ONCOLOGY | Facility: CLINIC | Age: 85
End: 2025-07-03
Payer: MEDICARE

## 2025-07-03 PROBLEM — M81.0 AGE-RELATED OSTEOPOROSIS WITHOUT CURRENT PATHOLOGICAL FRACTURE: Status: ACTIVE | Noted: 2025-07-03

## 2025-07-03 LAB
PTH-INTACT SERPL-MCNC: 46 PG/ML (ref 15–65)
VIT B12 SERPL-MCNC: 669 PG/ML (ref 232–1245)

## 2025-07-03 RX ORDER — DIPHENHYDRAMINE HYDROCHLORIDE 50 MG/ML
25 INJECTION, SOLUTION INTRAMUSCULAR; INTRAVENOUS
Start: 2025-07-10

## 2025-07-03 RX ORDER — MEPERIDINE HYDROCHLORIDE 25 MG/ML
25 INJECTION INTRAMUSCULAR; INTRAVENOUS; SUBCUTANEOUS
OUTPATIENT
Start: 2025-07-10

## 2025-07-03 RX ORDER — HEPARIN SODIUM (PORCINE) LOCK FLUSH IV SOLN 100 UNIT/ML 100 UNIT/ML
5 SOLUTION INTRAVENOUS
OUTPATIENT
Start: 2025-07-10

## 2025-07-03 RX ORDER — ALBUTEROL SULFATE 0.83 MG/ML
2.5 SOLUTION RESPIRATORY (INHALATION)
OUTPATIENT
Start: 2025-07-10

## 2025-07-03 RX ORDER — ALBUTEROL SULFATE 90 UG/1
1-2 INHALANT RESPIRATORY (INHALATION)
Start: 2025-07-10

## 2025-07-03 RX ORDER — EPINEPHRINE 1 MG/ML
0.3 INJECTION, SOLUTION, CONCENTRATE INTRAVENOUS EVERY 5 MIN PRN
OUTPATIENT
Start: 2025-07-10

## 2025-07-03 RX ORDER — ZOLEDRONIC ACID 0.05 MG/ML
5 INJECTION, SOLUTION INTRAVENOUS ONCE
Start: 2025-07-10

## 2025-07-03 RX ORDER — ACETAMINOPHEN 325 MG/1
650 TABLET ORAL
OUTPATIENT
Start: 2025-07-10

## 2025-07-03 RX ORDER — HEPARIN SODIUM,PORCINE 10 UNIT/ML
5-20 VIAL (ML) INTRAVENOUS DAILY PRN
OUTPATIENT
Start: 2025-07-10

## 2025-07-03 RX ORDER — DIPHENHYDRAMINE HYDROCHLORIDE 50 MG/ML
50 INJECTION, SOLUTION INTRAMUSCULAR; INTRAVENOUS
Start: 2025-07-10

## 2025-07-03 RX ORDER — METHYLPREDNISOLONE SODIUM SUCCINATE 40 MG/ML
40 INJECTION INTRAMUSCULAR; INTRAVENOUS
Start: 2025-07-10

## 2025-07-03 NOTE — TELEPHONE ENCOUNTER
Please let pt know additional lab results:    B12 level is normal.  Iron level is low, she should start on Iron supplement:  Ferrous Gluconate 325 mg a day.    I also would like her to see hematology since I'm not sure why she is anemic. Referral placed.    Kidney function is slightly slow, but stable. I put order for IV Reclast I. Infusion center should call in 2 weeks to schedule, if she does not ear from them, let us know.

## 2025-07-03 NOTE — PROGRESS NOTES
Hematology referral reviewed for [facility], see below.    Referral reason: referral received from primary care for anemia with iron deficiency    Clinical question entered by referring provider or through order transcription: anemia, iron defficiancy but also reactive lymphocytes, low retics     Referral received via: Internal referral by [facility]    Current abnormal labs: Available in Chart Review    Outreach: Mary sent to patient    Plan: Triage instructions updated and sent to NPS for completion.

## 2025-08-24 ENCOUNTER — HEALTH MAINTENANCE LETTER (OUTPATIENT)
Age: 85
End: 2025-08-24

## 2025-08-26 DIAGNOSIS — I10 PRIMARY HYPERTENSION: ICD-10-CM

## 2025-08-26 RX ORDER — METOPROLOL SUCCINATE 50 MG/1
50 TABLET, EXTENDED RELEASE ORAL DAILY
Qty: 90 TABLET | Refills: 3 | OUTPATIENT
Start: 2025-08-26

## 2025-09-04 ENCOUNTER — OFFICE VISIT (OUTPATIENT)
Dept: INTERNAL MEDICINE | Facility: CLINIC | Age: 85
End: 2025-09-04
Payer: MEDICARE

## 2025-09-04 VITALS
HEIGHT: 67 IN | RESPIRATION RATE: 17 BRPM | DIASTOLIC BLOOD PRESSURE: 80 MMHG | TEMPERATURE: 97.8 F | HEART RATE: 69 BPM | OXYGEN SATURATION: 99 % | WEIGHT: 166.3 LBS | BODY MASS INDEX: 26.1 KG/M2 | SYSTOLIC BLOOD PRESSURE: 139 MMHG

## 2025-09-04 DIAGNOSIS — Z95.2 HISTORY OF TRANSCATHETER AORTIC VALVE REPLACEMENT (TAVR): ICD-10-CM

## 2025-09-04 DIAGNOSIS — D50.9 IRON DEFICIENCY ANEMIA, UNSPECIFIED IRON DEFICIENCY ANEMIA TYPE: Primary | ICD-10-CM

## 2025-09-04 DIAGNOSIS — K52.831 COLLAGENOUS COLITIS: ICD-10-CM

## 2025-09-04 DIAGNOSIS — M81.0 AGE-RELATED OSTEOPOROSIS WITHOUT CURRENT PATHOLOGICAL FRACTURE: ICD-10-CM

## 2025-09-04 LAB
BASOPHILS # BLD AUTO: 0.04 10E3/UL (ref 0–0.2)
BASOPHILS NFR BLD AUTO: 0.5 %
EOSINOPHIL # BLD AUTO: 0.14 10E3/UL (ref 0–0.7)
EOSINOPHIL NFR BLD AUTO: 1.8 %
ERYTHROCYTE [DISTWIDTH] IN BLOOD BY AUTOMATED COUNT: 20.1 % (ref 10–15)
HCT VFR BLD AUTO: 35.3 % (ref 35–47)
HGB BLD-MCNC: 11.3 G/DL (ref 11.7–15.7)
IMM GRANULOCYTES # BLD: <0.04 10E3/UL
IMM GRANULOCYTES NFR BLD: 0.3 %
LYMPHOCYTES # BLD AUTO: 1.8 10E3/UL (ref 0.8–5.3)
LYMPHOCYTES NFR BLD AUTO: 23.4 %
MCH RBC QN AUTO: 28.2 PG (ref 26.5–33)
MCHC RBC AUTO-ENTMCNC: 32 G/DL (ref 31.5–36.5)
MCV RBC AUTO: 88 FL (ref 78–100)
MONOCYTES # BLD AUTO: 0.63 10E3/UL (ref 0–1.3)
MONOCYTES NFR BLD AUTO: 8.2 %
NEUTROPHILS # BLD AUTO: 5.06 10E3/UL (ref 1.6–8.3)
NEUTROPHILS NFR BLD AUTO: 65.8 %
PLATELET # BLD AUTO: 242 10E3/UL (ref 150–450)
RBC # BLD AUTO: 4.01 10E6/UL (ref 3.8–5.2)
RETICS # AUTO: 0.03 10E6/UL (ref 0.03–0.1)
RETICS/RBC NFR AUTO: 0.93 % (ref 0.5–2)
WBC # BLD AUTO: 7.69 10E3/UL (ref 4–11)